# Patient Record
Sex: MALE | Race: WHITE | NOT HISPANIC OR LATINO | Employment: OTHER | URBAN - METROPOLITAN AREA
[De-identification: names, ages, dates, MRNs, and addresses within clinical notes are randomized per-mention and may not be internally consistent; named-entity substitution may affect disease eponyms.]

---

## 2018-03-05 ENCOUNTER — APPOINTMENT (EMERGENCY)
Dept: RADIOLOGY | Facility: HOSPITAL | Age: 83
End: 2018-03-05
Payer: MEDICARE

## 2018-03-05 ENCOUNTER — HOSPITAL ENCOUNTER (EMERGENCY)
Facility: HOSPITAL | Age: 83
Discharge: LEFT BEFORE DISCHARGE | End: 2018-03-05
Attending: EMERGENCY MEDICINE
Payer: MEDICARE

## 2018-03-05 VITALS
HEART RATE: 72 BPM | DIASTOLIC BLOOD PRESSURE: 84 MMHG | WEIGHT: 155 LBS | OXYGEN SATURATION: 99 % | TEMPERATURE: 97.8 F | SYSTOLIC BLOOD PRESSURE: 189 MMHG | RESPIRATION RATE: 16 BRPM

## 2018-03-05 DIAGNOSIS — R29.6 FREQUENT FALLS: Primary | ICD-10-CM

## 2018-03-05 DIAGNOSIS — S42.291A HUMERAL HEAD FRACTURE, RIGHT, CLOSED, INITIAL ENCOUNTER: ICD-10-CM

## 2018-03-05 PROCEDURE — 73030 X-RAY EXAM OF SHOULDER: CPT

## 2018-03-05 PROCEDURE — 99284 EMERGENCY DEPT VISIT MOD MDM: CPT

## 2018-03-05 RX ORDER — MORPHINE SULFATE 2 MG/ML
2 INJECTION, SOLUTION INTRAMUSCULAR; INTRAVENOUS ONCE
Status: DISCONTINUED | OUTPATIENT
Start: 2018-03-05 | End: 2018-03-05

## 2018-03-05 RX ORDER — OXYCODONE HYDROCHLORIDE AND ACETAMINOPHEN 5; 325 MG/1; MG/1
1 TABLET ORAL EVERY 4 HOURS PRN
Qty: 15 TABLET | Refills: 0 | Status: SHIPPED | OUTPATIENT
Start: 2018-03-05 | End: 2018-03-13

## 2018-03-05 RX ORDER — OXYCODONE HYDROCHLORIDE AND ACETAMINOPHEN 5; 325 MG/1; MG/1
1 TABLET ORAL ONCE
Status: COMPLETED | OUTPATIENT
Start: 2018-03-05 | End: 2018-03-05

## 2018-03-05 RX ADMIN — OXYCODONE HYDROCHLORIDE AND ACETAMINOPHEN 1 TABLET: 5; 325 TABLET ORAL at 07:37

## 2018-03-05 NOTE — ED PROVIDER NOTES
History  Chief Complaint   Patient presents with    Multiple Falls     pt states that he fell multiple times yesterday  denies any head injury or LOC  c/o right shoulder and arm pain  Pt  Shawna Womack 3 times in the past 2 days, once down 2 metal stairs  Pt  Didn't hit his head, not on blood thinners, no LOC, no headaches, no n/v, no change in mental status  Pt  Lives with son and ambulates without assistance  None       No past medical history on file  Past Surgical History:   Procedure Laterality Date    CARDIAC SURGERY         No family history on file  I have reviewed and agree with the history as documented  Social History   Substance Use Topics    Smoking status: Current Some Day Smoker     Types: Pipe    Smokeless tobacco: Never Used    Alcohol use Yes      Comment: socially        Review of Systems   Constitutional: Negative for appetite change, fatigue and fever  HENT: Negative for rhinorrhea and sore throat  Respiratory: Negative for cough, shortness of breath and wheezing  Cardiovascular: Negative for chest pain and leg swelling  Gastrointestinal: Negative for abdominal pain, diarrhea and vomiting  Genitourinary: Negative for dysuria and flank pain  Musculoskeletal: Positive for joint swelling  Negative for back pain and neck pain  Skin: Negative for rash  Neurological: Negative for syncope and headaches     Psychiatric/Behavioral:        Mood normal       Physical Exam  ED Triage Vitals [03/05/18 0652]   Temperature Pulse Respirations Blood Pressure SpO2   97 8 °F (36 6 °C) 72 16 (!) 189/84 99 %      Temp Source Heart Rate Source Patient Position - Orthostatic VS BP Location FiO2 (%)   Oral Monitor Sitting Left arm --      Pain Score       Worst Possible Pain           Orthostatic Vital Signs  Vitals:    03/05/18 0652   BP: (!) 189/84   Pulse: 72   Patient Position - Orthostatic VS: Sitting       Physical Exam   Constitutional: He is oriented to person, place, and time  He appears well-developed and well-nourished  HENT:   Head: Normocephalic and atraumatic  Mouth/Throat: Oropharynx is clear and moist    Eyes: Pupils are equal, round, and reactive to light  Neck: Normal range of motion  Neck supple  nontender   Cardiovascular: Normal rate and regular rhythm  Pulmonary/Chest: Effort normal and breath sounds normal    Abdominal: Soft  There is no tenderness  Musculoskeletal:   R shoulder xray +tenderness anteriorly, decreased ROM secondary to pain, +n/v intact    All other ext  - FROM, nontender   Neurological: He is alert and oriented to person, place, and time  Skin: Skin is warm and dry  Psychiatric: He has a normal mood and affect  Nursing note and vitals reviewed  ED Medications  Medications   oxyCODONE-acetaminophen (PERCOCET) 5-325 mg per tablet 1 tablet (1 tablet Oral Given 3/5/18 0737)       Diagnostic Studies  Results Reviewed     None                 XR shoulder 2+ views RIGHT    (Results Pending)              Procedures  Procedures       Phone Contacts  ED Phone Contact    ED Course  ED Course                                MDM  Number of Diagnoses or Management Options  Frequent falls:   Humeral head fracture, right, closed, initial encounter:      Amount and/or Complexity of Data Reviewed  Tests in the radiology section of CPT®: ordered and reviewed    Risk of Complications, Morbidity, and/or Mortality  Presenting problems: moderate  General comments: Pt  Is refusing bloodwork, CT head, any further workup  He only agrees to a R shoulder xray  Son is with him when I explained that an 80year old who fell 3 times in the past 2 days needs a further workup to r/o more serious things such as a bleed in the brain, MI, electrolyte or other lab abnormality, stroke , etc   Pt  Is signing out against medical advice and understands the risks of doing so       He was given a sling for the proximal humeral head fracture that was apparent on xray and understands to follow up with the orthopedic dr   He will return if anything is worse  He is AAOx3 and able to make his own decisions  CritCare Time    Disposition  Final diagnoses:   Frequent falls   Humeral head fracture, right, closed, initial encounter     Time reflects when diagnosis was documented in both MDM as applicable and the Disposition within this note     Time User Action Codes Description Comment    3/5/2018  7:49 AM Mayito Virk Add [R29 6] Frequent falls     3/5/2018  7:49 AM Chhaya Virk Add [P83 668I] Humeral head fracture, right, closed, initial encounter       ED Disposition     ED Disposition Condition Comment    AMA  Date: 3/5/2018  Patient: Alva Ferrari  Admitted: 3/5/2018  6:51 AM  Attending Provider: Priscila Hill MD    Alva Ferrari or his authorized caregiver has made the decision for the patient to leave the emergency department against the advic e of the emergency department staff  He or his authorized caregiver has been informed and understands the inherent risks, including death, stroke, heart attack, worsening of symptoms  He or his authorized caregiver has decided to accept the responsibili ty for this decision  Alva Ferrari and all necessary parties have been advised that he may return for further evaluation or treatment  His condition at time of discharge was stable    Alva Ferrari had current vital signs as follows:  BP (!) 189/84 (BP  Location: Left arm)   Pulse 72   Temp 97 8 °F (36 6 °C) (Oral)   Resp 16   Wt 70 3 kg (155 lb)         Follow-up Information     Follow up With Specialties Details Why Contact Info    [facility] [address]   [address] [address] [place] [address] [facility]   [address] [place] [address] [phone]        Patient's Medications   Discharge Prescriptions    OXYCODONE-ACETAMINOPHEN (PERCOCET) 5-325 MG PER TABLET    Take 1 tablet by mouth every 4 (four) hours as needed for moderate pain Max Daily Amount: 6 tablets       Start Date: 3/5/2018  End Date: --       Order Dose: 1 tablet       Quantity: 15 tablet    Refills: 0     No discharge procedures on file      ED Provider  Electronically Signed by           Priscila Hill MD  03/05/18 0914

## 2018-03-05 NOTE — DISCHARGE INSTRUCTIONS
Motrin/percocet for pain, arm sling, follow up with orthopedics, rest, ice      Proximal Humerus Fracture   WHAT YOU NEED TO KNOW:   A proximal humerus fracture is a crack or break in the top of your upper arm bone  The proximal humerus is one of the bones in your shoulder joint  This type of fracture may be caused by a fall, trauma from a car accident, or a sports injury  DISCHARGE INSTRUCTIONS:   Return to the emergency department if:   · Your pain does not get better or gets worse, even after you rest and take medicine  · Your arm, hand, or fingers feel numb  · The skin over your fracture is swollen, cold, or pale  · You cannot move your arm, hand, or fingers  Contact your healthcare provider if:   · You have a fever  · Your sling gets wet, damaged, or falls off  · You have questions or concerns about your condition or care  Medicines:   · Prescription pain medicine  may be given  Ask your healthcare provider how to take this medicine safely  Some prescription pain medicines contain acetaminophen  Do not take other medicines that contain acetaminophen without talking to your healthcare provider  Too much acetaminophen may cause liver damage  Prescription pain medicine may cause constipation  Ask your healthcare provider how to prevent or treat constipation  · NSAIDs , such as ibuprofen, help decrease swelling, pain, and fever  This medicine is available with or without a doctor's order  NSAIDs can cause stomach bleeding or kidney problems in certain people  If you take blood thinner medicine, always ask your healthcare provider if NSAIDs are safe for you  Always read the medicine label and follow directions  · Acetaminophen  decreases pain  It is available without a doctor's order  Ask how much to take and how often to take it  Follow directions  Acetaminophen can cause liver damage if not taken correctly  · Take your medicine as directed    Contact your healthcare provider if you think your medicine is not helping or if you have side effects  Tell him of her if you are allergic to any medicine  Keep a list of the medicines, vitamins, and herbs you take  Include the amounts, and when and why you take them  Bring the list or the pill bottles to follow-up visits  Carry your medicine list with you in case of an emergency  A sling  may be needed to hold your broken bones in place  It will decrease your arm movement and allow the bones to heal    Manage your symptoms:   · Rest  your arm as much as possible  Ask your healthcare provider when you can move your arm  Also ask when you can return to sports or vigorous exercises  · Apply ice  on your arm for 15 to 20 minutes every hour or as directed  Use an ice pack, or put crushed ice in a plastic bag  Cover it with a towel  Ice helps prevent tissue damage and decreases swelling and pain  · Go to physical therapy as directed  A physical therapist teaches you exercises to help improve movement and strength, and to decrease pain  Follow up with your healthcare provider as directed:  Write down your questions so you remember to ask them during your visits  © 2017 [facility] Information is for End User's use only and may not be sold, redistributed or otherwise used for commercial purposes  All illustrations and images included in CareNotes® are the copyrighted property of A D A M , Inc  or Shayne Rodríguez  The above information is an  only  It is not intended as medical advice for individual conditions or treatments  Talk to your doctor, nurse or pharmacist before following any medical regimen to see if it is safe and effective for you  Fall Prevention for Older Adults   WHAT YOU NEED TO KNOW:   As you age, your muscles weaken and your risk for falls increases  Your risk also increases if you take medicines that make you sleepy or dizzy   You may also be at risk if you have vision or joint problems, have low blood pressure, or are not active  DISCHARGE INSTRUCTIONS:   Call 911 or have someone else call if:   · You have fallen and are unconscious  · You have fallen and cannot move part of your body  Contact your healthcare provider if:   · You have fallen and have pain or a headache  · You have questions or concerns about your condition or care  Fall prevention tips:   · Stay active  Exercise can help strengthen your muscles and improve your balance  Your healthcare provider may recommend water aerobics, walking, or Rory Chi  He may also recommend physical therapy to improve your coordination  Never start an exercise program without asking your healthcare provider first     · Wear shoes that fit well and have soles that   Wear shoes both inside and outside  Use slippers with good   Avoid shoes with high heels  · Use assistive devices as directed  Your healthcare provider may suggest that you use a cane or walker to help you keep your balance  You may need to have grab bars put in your bathroom near the toilet or in the shower  · Stand or sit up slowly  This may help you keep your balance and prevent falls  · Wear a personal alarm  This is a device that allows you to call 911 if you need help  Ask for more information on personal alarms  · Manage your medical conditions  Keep all appointments with your healthcare providers  Visit your eye doctor as directed  Home safety tips:   · Add items to prevent falls in the bathroom  Put nonslip strips on your bath or shower floor to prevent you from slipping  Use a bath mat if you do not have carpet in the bathroom  This will prevent you from falling when you step out of the bath or shower  Use a shower seat so you do not need to stand while you shower  Sit on the toilet or a chair in your bathroom to dry yourself and put on clothing   This will prevent you from losing your balance from drying or dressing yourself while you are standing  · Keep paths clear  Remove books, shoes, and other objects from walkways and stairs  Place cords for telephones and lamps out of the way so that you do not need to walk over them  Tape them down if you cannot move them  Remove small rugs  If you cannot remove a rug, secure it with double-sided tape  This will prevent you from tripping  · Install bright lights in your home  Use night lights to help light paths to the bathroom or kitchen  Always turn on the light before you start walking  · Keep items you use often on shelves within reach  Do not use a step stool to help you reach an item  · Paint or place reflective tape on the edges of your stairs  This will help you see the stairs better  Follow up with your healthcare provider as directed:  Write down your questions so you remember to ask them during your visits  © 2017 [facility] Information is for End User's use only and may not be sold, redistributed or otherwise used for commercial purposes  All illustrations and images included in CareNotes® are the copyrighted property of [employer]  or Shayne Rodríguez  The above information is an  only  It is not intended as medical advice for individual conditions or treatments  Talk to your doctor, nurse or pharmacist before following any medical regimen to see if it is safe and effective for you

## 2018-03-13 ENCOUNTER — APPOINTMENT (OUTPATIENT)
Dept: RADIOLOGY | Facility: CLINIC | Age: 83
End: 2018-03-13
Payer: MEDICARE

## 2018-03-13 ENCOUNTER — OFFICE VISIT (OUTPATIENT)
Dept: OBGYN CLINIC | Facility: CLINIC | Age: 83
End: 2018-03-13
Payer: MEDICARE

## 2018-03-13 VITALS
HEART RATE: 75 BPM | HEIGHT: 66 IN | BODY MASS INDEX: 25.41 KG/M2 | WEIGHT: 158.13 LBS | SYSTOLIC BLOOD PRESSURE: 156 MMHG | DIASTOLIC BLOOD PRESSURE: 79 MMHG

## 2018-03-13 DIAGNOSIS — M25.511 RIGHT SHOULDER PAIN, UNSPECIFIED CHRONICITY: Primary | ICD-10-CM

## 2018-03-13 DIAGNOSIS — S40.021A CONTUSION, SHOULDER AND UPPER ARM, MULTIPLE SITES, RIGHT, INITIAL ENCOUNTER: ICD-10-CM

## 2018-03-13 DIAGNOSIS — M19.011 PRIMARY OSTEOARTHRITIS OF RIGHT SHOULDER: ICD-10-CM

## 2018-03-13 DIAGNOSIS — S40.011A CONTUSION, SHOULDER AND UPPER ARM, MULTIPLE SITES, RIGHT, INITIAL ENCOUNTER: ICD-10-CM

## 2018-03-13 DIAGNOSIS — M25.511 RIGHT SHOULDER PAIN, UNSPECIFIED CHRONICITY: ICD-10-CM

## 2018-03-13 PROCEDURE — 73030 X-RAY EXAM OF SHOULDER: CPT

## 2018-03-13 PROCEDURE — 99203 OFFICE O/P NEW LOW 30 MIN: CPT | Performed by: ORTHOPAEDIC SURGERY

## 2018-03-13 RX ORDER — TRAMADOL HYDROCHLORIDE 50 MG/1
50 TABLET ORAL EVERY 6 HOURS PRN
Qty: 30 TABLET | Refills: 0 | Status: ON HOLD | OUTPATIENT
Start: 2018-03-13 | End: 2018-04-15

## 2018-03-13 RX ORDER — IBUPROFEN 200 MG
200 TABLET ORAL EVERY 6 HOURS PRN
COMMUNITY
End: 2018-03-13 | Stop reason: ALTCHOICE

## 2018-04-15 ENCOUNTER — HOSPITAL ENCOUNTER (INPATIENT)
Facility: HOSPITAL | Age: 83
LOS: 2 days | Discharge: HOME | DRG: 282 | End: 2018-04-17
Attending: EMERGENCY MEDICINE | Admitting: INTERNAL MEDICINE
Payer: MEDICARE

## 2018-04-15 ENCOUNTER — APPOINTMENT (EMERGENCY)
Dept: RADIOLOGY | Facility: HOSPITAL | Age: 83
DRG: 282 | End: 2018-04-15
Payer: MEDICARE

## 2018-04-15 DIAGNOSIS — I21.4 NSTEMI (NON-ST ELEVATED MYOCARDIAL INFARCTION) (HCC): Primary | ICD-10-CM

## 2018-04-15 DIAGNOSIS — E78.5 HYPERLIPIDEMIA: ICD-10-CM

## 2018-04-15 DIAGNOSIS — I25.10 CAD (CORONARY ARTERY DISEASE): Chronic | ICD-10-CM

## 2018-04-15 PROBLEM — R07.9 CHEST PAIN: Status: ACTIVE | Noted: 2018-04-15

## 2018-04-15 LAB
ALBUMIN SERPL BCP-MCNC: 3.2 G/DL (ref 3.5–5)
ALP SERPL-CCNC: 85 U/L (ref 46–116)
ALT SERPL W P-5'-P-CCNC: 15 U/L (ref 12–78)
ANION GAP SERPL CALCULATED.3IONS-SCNC: 8 MMOL/L (ref 4–13)
APTT PPP: 171 SECONDS (ref 23–35)
APTT PPP: 51 SECONDS (ref 23–35)
AST SERPL W P-5'-P-CCNC: 15 U/L (ref 5–45)
ATRIAL RATE: 62 BPM
ATRIAL RATE: 71 BPM
BASOPHILS # BLD AUTO: 0.04 THOUSANDS/ΜL (ref 0–0.1)
BASOPHILS NFR BLD AUTO: 1 % (ref 0–1)
BILIRUB SERPL-MCNC: 0.3 MG/DL (ref 0.2–1)
BUN SERPL-MCNC: 20 MG/DL (ref 5–25)
CALCIUM SERPL-MCNC: 8.5 MG/DL
CHLORIDE SERPL-SCNC: 106 MMOL/L (ref 100–108)
CHOLEST SERPL-MCNC: 149 MG/DL (ref 50–200)
CO2 SERPL-SCNC: 26 MMOL/L (ref 21–32)
CREAT SERPL-MCNC: 1.3 MG/DL (ref 0.6–1.3)
EOSINOPHIL # BLD AUTO: 0.19 THOUSAND/ΜL (ref 0–0.61)
EOSINOPHIL NFR BLD AUTO: 3 % (ref 0–6)
ERYTHROCYTE [DISTWIDTH] IN BLOOD BY AUTOMATED COUNT: 14 % (ref 11.6–15.1)
ERYTHROCYTE [DISTWIDTH] IN BLOOD BY AUTOMATED COUNT: 14.1 % (ref 11.6–15.1)
GFR SERPL CREATININE-BSD FRML MDRD: 49 ML/MIN/1.73SQ M
GLUCOSE SERPL-MCNC: 111 MG/DL (ref 65–140)
HCT VFR BLD AUTO: 35 % (ref 36.5–49.3)
HCT VFR BLD AUTO: 37.3 % (ref 36.5–49.3)
HDLC SERPL-MCNC: 44 MG/DL (ref 40–60)
HGB BLD-MCNC: 11.3 G/DL (ref 12–17)
HGB BLD-MCNC: 11.9 G/DL (ref 12–17)
INR PPP: 1.12 (ref 0.86–1.16)
LDLC SERPL CALC-MCNC: 91 MG/DL (ref 0–100)
LIPASE SERPL-CCNC: 143 U/L (ref 73–393)
LYMPHOCYTES # BLD AUTO: 1.38 THOUSANDS/ΜL (ref 0.6–4.47)
LYMPHOCYTES NFR BLD AUTO: 22 % (ref 14–44)
MCH RBC QN AUTO: 31.1 PG (ref 26.8–34.3)
MCH RBC QN AUTO: 31.2 PG (ref 26.8–34.3)
MCHC RBC AUTO-ENTMCNC: 31.9 G/DL (ref 31.4–37.4)
MCHC RBC AUTO-ENTMCNC: 32.3 G/DL (ref 31.4–37.4)
MCV RBC AUTO: 96 FL (ref 82–98)
MCV RBC AUTO: 98 FL (ref 82–98)
MONOCYTES # BLD AUTO: 0.69 THOUSAND/ΜL (ref 0.17–1.22)
MONOCYTES NFR BLD AUTO: 11 % (ref 4–12)
NEUTROPHILS # BLD AUTO: 3.87 THOUSANDS/ΜL (ref 1.85–7.62)
NEUTS SEG NFR BLD AUTO: 63 % (ref 43–75)
NONHDLC SERPL-MCNC: 105 MG/DL
NRBC BLD AUTO-RTO: 0 /100 WBCS
P AXIS: 57 DEGREES
P AXIS: 78 DEGREES
PLATELET # BLD AUTO: 224 THOUSANDS/UL (ref 149–390)
PLATELET # BLD AUTO: 249 THOUSANDS/UL (ref 149–390)
PMV BLD AUTO: 9.3 FL (ref 8.9–12.7)
PMV BLD AUTO: 9.4 FL (ref 8.9–12.7)
POTASSIUM SERPL-SCNC: 4.1 MMOL/L (ref 3.5–5.3)
PR INTERVAL: 218 MS
PR INTERVAL: 232 MS
PROT SERPL-MCNC: 6.3 G/DL (ref 6.4–8.2)
PROTHROMBIN TIME: 14.7 SECONDS (ref 12.1–14.4)
QRS AXIS: -24 DEGREES
QRS AXIS: -26 DEGREES
QRSD INTERVAL: 88 MS
QRSD INTERVAL: 92 MS
QT INTERVAL: 380 MS
QT INTERVAL: 432 MS
QTC INTERVAL: 412 MS
QTC INTERVAL: 438 MS
RBC # BLD AUTO: 3.63 MILLION/UL (ref 3.88–5.62)
RBC # BLD AUTO: 3.82 MILLION/UL (ref 3.88–5.62)
SODIUM SERPL-SCNC: 140 MMOL/L (ref 136–145)
T WAVE AXIS: 107 DEGREES
T WAVE AXIS: 78 DEGREES
TRIGL SERPL-MCNC: 70 MG/DL
TROPONIN I SERPL-MCNC: 0.17 NG/ML
TROPONIN I SERPL-MCNC: 0.26 NG/ML
TROPONIN I SERPL-MCNC: 0.31 NG/ML
VENTRICULAR RATE: 62 BPM
VENTRICULAR RATE: 71 BPM
WBC # BLD AUTO: 5.69 THOUSAND/UL (ref 4.31–10.16)
WBC # BLD AUTO: 6.18 THOUSAND/UL (ref 4.31–10.16)

## 2018-04-15 PROCEDURE — 85730 THROMBOPLASTIN TIME PARTIAL: CPT | Performed by: PHYSICIAN ASSISTANT

## 2018-04-15 PROCEDURE — 85730 THROMBOPLASTIN TIME PARTIAL: CPT | Performed by: INTERNAL MEDICINE

## 2018-04-15 PROCEDURE — 80053 COMPREHEN METABOLIC PANEL: CPT | Performed by: EMERGENCY MEDICINE

## 2018-04-15 PROCEDURE — 83690 ASSAY OF LIPASE: CPT | Performed by: EMERGENCY MEDICINE

## 2018-04-15 PROCEDURE — 36415 COLL VENOUS BLD VENIPUNCTURE: CPT | Performed by: EMERGENCY MEDICINE

## 2018-04-15 PROCEDURE — 80061 LIPID PANEL: CPT | Performed by: INTERNAL MEDICINE

## 2018-04-15 PROCEDURE — 85025 COMPLETE CBC W/AUTO DIFF WBC: CPT | Performed by: EMERGENCY MEDICINE

## 2018-04-15 PROCEDURE — 85027 COMPLETE CBC AUTOMATED: CPT | Performed by: PHYSICIAN ASSISTANT

## 2018-04-15 PROCEDURE — 93005 ELECTROCARDIOGRAM TRACING: CPT

## 2018-04-15 PROCEDURE — 85610 PROTHROMBIN TIME: CPT | Performed by: PHYSICIAN ASSISTANT

## 2018-04-15 PROCEDURE — 99222 1ST HOSP IP/OBS MODERATE 55: CPT | Performed by: INTERNAL MEDICINE

## 2018-04-15 PROCEDURE — 71046 X-RAY EXAM CHEST 2 VIEWS: CPT

## 2018-04-15 PROCEDURE — 99285 EMERGENCY DEPT VISIT HI MDM: CPT

## 2018-04-15 PROCEDURE — 84484 ASSAY OF TROPONIN QUANT: CPT | Performed by: INTERNAL MEDICINE

## 2018-04-15 PROCEDURE — 84484 ASSAY OF TROPONIN QUANT: CPT | Performed by: EMERGENCY MEDICINE

## 2018-04-15 PROCEDURE — 93010 ELECTROCARDIOGRAM REPORT: CPT | Performed by: INTERNAL MEDICINE

## 2018-04-15 RX ORDER — HEPARIN SODIUM 10000 [USP'U]/100ML
3-20 INJECTION, SOLUTION INTRAVENOUS
Status: DISCONTINUED | OUTPATIENT
Start: 2018-04-15 | End: 2018-04-16

## 2018-04-15 RX ORDER — ASPIRIN 325 MG
325 TABLET ORAL DAILY
Status: DISCONTINUED | OUTPATIENT
Start: 2018-04-16 | End: 2018-04-16

## 2018-04-15 RX ORDER — NITROGLYCERIN 0.4 MG/1
0.4 TABLET SUBLINGUAL
Status: DISCONTINUED | OUTPATIENT
Start: 2018-04-15 | End: 2018-04-17 | Stop reason: HOSPADM

## 2018-04-15 RX ORDER — ASPIRIN 81 MG/1
324 TABLET, CHEWABLE ORAL ONCE
Status: COMPLETED | OUTPATIENT
Start: 2018-04-15 | End: 2018-04-15

## 2018-04-15 RX ORDER — ACETAMINOPHEN 325 MG/1
650 TABLET ORAL EVERY 6 HOURS PRN
Status: DISCONTINUED | OUTPATIENT
Start: 2018-04-15 | End: 2018-04-17 | Stop reason: HOSPADM

## 2018-04-15 RX ORDER — HEPARIN SODIUM 1000 [USP'U]/ML
1950 INJECTION, SOLUTION INTRAVENOUS; SUBCUTANEOUS AS NEEDED
Status: DISCONTINUED | OUTPATIENT
Start: 2018-04-15 | End: 2018-04-16

## 2018-04-15 RX ORDER — METOPROLOL TARTRATE 50 MG/1
50 TABLET, FILM COATED ORAL EVERY 12 HOURS SCHEDULED
Status: DISCONTINUED | OUTPATIENT
Start: 2018-04-15 | End: 2018-04-16

## 2018-04-15 RX ORDER — HEPARIN SODIUM 1000 [USP'U]/ML
3900 INJECTION, SOLUTION INTRAVENOUS; SUBCUTANEOUS ONCE
Status: COMPLETED | OUTPATIENT
Start: 2018-04-15 | End: 2018-04-15

## 2018-04-15 RX ORDER — 0.9 % SODIUM CHLORIDE 0.9 %
3 VIAL (ML) INJECTION AS NEEDED
Status: DISCONTINUED | OUTPATIENT
Start: 2018-04-15 | End: 2018-04-17 | Stop reason: HOSPADM

## 2018-04-15 RX ORDER — ATORVASTATIN CALCIUM 40 MG/1
80 TABLET, FILM COATED ORAL
Status: DISCONTINUED | OUTPATIENT
Start: 2018-04-15 | End: 2018-04-16

## 2018-04-15 RX ORDER — HEPARIN SODIUM 1000 [USP'U]/ML
3900 INJECTION, SOLUTION INTRAVENOUS; SUBCUTANEOUS AS NEEDED
Status: DISCONTINUED | OUTPATIENT
Start: 2018-04-15 | End: 2018-04-16

## 2018-04-15 RX ORDER — HYDRALAZINE HYDROCHLORIDE 20 MG/ML
10 INJECTION INTRAMUSCULAR; INTRAVENOUS EVERY 6 HOURS PRN
Status: DISCONTINUED | OUTPATIENT
Start: 2018-04-15 | End: 2018-04-17 | Stop reason: HOSPADM

## 2018-04-15 RX ORDER — ONDANSETRON 2 MG/ML
4 INJECTION INTRAMUSCULAR; INTRAVENOUS EVERY 6 HOURS PRN
Status: DISCONTINUED | OUTPATIENT
Start: 2018-04-15 | End: 2018-04-17 | Stop reason: HOSPADM

## 2018-04-15 RX ADMIN — METOPROLOL TARTRATE 50 MG: 50 TABLET ORAL at 21:15

## 2018-04-15 RX ADMIN — ASPIRIN 81 MG 324 MG: 81 TABLET ORAL at 07:57

## 2018-04-15 RX ADMIN — HEPARIN SODIUM AND DEXTROSE 12 UNITS/KG/HR: 10000; 5 INJECTION INTRAVENOUS at 11:47

## 2018-04-15 RX ADMIN — ATORVASTATIN CALCIUM 80 MG: 40 TABLET, FILM COATED ORAL at 15:46

## 2018-04-15 RX ADMIN — HEPARIN SODIUM 1950 UNITS: 1000 INJECTION, SOLUTION INTRAVENOUS; SUBCUTANEOUS at 21:08

## 2018-04-15 RX ADMIN — HEPARIN SODIUM 3900 UNITS: 1000 INJECTION, SOLUTION INTRAVENOUS; SUBCUTANEOUS at 11:42

## 2018-04-15 RX ADMIN — METOPROLOL TARTRATE 50 MG: 50 TABLET ORAL at 14:46

## 2018-04-15 NOTE — ED NOTES
Patient cooperative at this time  Not a good historian for past medical hx or medications        Saeed Dempsey RN  04/15/18 9671

## 2018-04-15 NOTE — CONSULTS
Consultation - Cardiology Team One  Maame Peres 80 y o  male MRN: [id number]  Unit/Bed#: ED 14 Encounter: [id number]    Inpatient consult to Cardiology  Consult performed by: Dylon Lee  Consult ordered by: Esau ePrez          Physician Requesting Consult: Lola Horse*  Reason for Consult / Principal Problem: Chest pain    HPI: Cardiologist Dr Devon Aguilar is a 80y o  year old male who has a history of CAD s/p CABG presenting with chest pain since yesterday  Patient describes chest pain as sharp, with associated heaviness in the chest, substernal, lasting 30-45 minutes yesterday  Denies shortness of breath, lightheadedness, palpitations, leg swelling, syncope  Patient has not seen a cardiologist in several years  REVIEW OF SYSTEMS:  Constitutional:  Denies fever or chills   Eyes:  Denies change in visual acuity   HENT:  Denies nasal congestion or sore throat   Respiratory:  Denies cough or shortness of breath   Cardiovascular: +chest pain   Denies edema   GI:  Denies abdominal pain, nausea, vomiting, bloody stools or diarrhea   :  Denies dysuria, frequency, difficulty in micturition and nocturia  Musculoskeletal:  Denies back pain or joint pain   Neurologic:  Denies headache, focal weakness or sensory changes   Endocrine:  Denies polyuria or polydipsia   Lymphatic:  Denies swollen glands   Psychiatric:  Denies depression or anxiety     Historical Information   Past Medical History:   Diagnosis Date    Arthritis     Disease of thyroid gland     Heart disease      Past Surgical History:   Procedure Laterality Date    CARDIAC SURGERY       History   Alcohol Use    Yes     Comment: socially     History   Drug Use No     History   Smoking Status    Current Some Day Smoker    Types: Pipe   Smokeless Tobacco    Never Used       Family History:   Family History   Problem Relation Age of Onset    No Known Problems Mother     No Known Problems Father MEDS & ALLERGIES:  all current active meds have been reviewed and current meds: Current Facility-Administered Medications   Medication Dose Route Frequency    sodium chloride (PF) 0 9 % injection 3 mL  3 mL Intravenous PRN        Allergies   Allergen Reactions    Penicillins Swelling    Keflex [Cephalexin]     Prednisone Other (See Comments)     Pt states "i don't know, I break out"       OBJECTIVE:  Vitals:   Vitals:    04/15/18 1015   BP: (!) 175/88   Pulse: 65   Resp:    Temp:    SpO2: 95%     Body mass index is 24 84 kg/m²  Systolic (81HUJ), UCA:299 , Min:151 , QCQ:027     Diastolic (78CZS), UJX:77, Min:73, Max:153    No intake or output data in the 24 hours ending 04/15/18 1033  Weight (last 2 days)     Date/Time   Weight    04/15/18 0725  69 8 (153 88)            Invasive Devices     Peripheral Intravenous Line            Peripheral IV 04/15/18 Left Antecubital less than 1 day                PHYSICAL EXAMS:  General:  Patient is not in acute distress, laying in the bed comfortably, awake, alert responding to commands  Head: Normocephalic, Atraumatic  HEENT: White sclera, pink conjunctiva,  PERRLA,pharynx benign  Neck:  Supple, no neck vein distention, carotids+2/+2 no bruits, thyromegaly, adenopathy  Respiratory: clear to P/A  Cardiovascular:  PMI normal, S1-S2 normal, No  Murmurs, thrills, gallops, rubs   Regular rhythm  GI:  Abdomen soft nontender   No hepatosplenomegaly, adenopathy, ascites,or rebound tenderness  Extremities: No edema, normal pulses, no calf tenderness, no joint deformities, no venous disease   Integument:  No skin rashes or ulceration  Lymphatic:  No cervical or inguinal lymphadenopathy  Neurologic:  Patient is awake alert, responding to command, well-oriented to time and place and person moving all extremities    LABORATORY RESULTS:    Results from last 7 days  Lab Units 04/15/18  0754   TROPONIN I ng/mL 0 17*     CBC with diff:   Results from last 7 days  Lab Units 04/15/18  0754   WBC Thousand/uL 6 18   HEMOGLOBIN g/dL 11 9*   HEMATOCRIT % 37 3   MCV fL 98   PLATELETS Thousands/uL 249   MCH pg 31 2   MCHC g/dL 31 9   RDW % 14 1   MPV fL 9 3   NRBC AUTO /100 WBCs 0       CMP:  Results from last 7 days  Lab Units 04/15/18  0754   SODIUM mmol/L 140   POTASSIUM mmol/L 4 1   CHLORIDE mmol/L 106   CO2 mmol/L 26   ANION GAP mmol/L 8   BUN mg/dL 20   CREATININE mg/dL 1 30   GLUCOSE RANDOM mg/dL 111   CALCIUM mg/dL 8 5   AST U/L 15   ALT U/L 15   ALK PHOS U/L 85   TOTAL PROTEIN g/dL 6 3*   BILIRUBIN TOTAL mg/dL 0 30   EGFR ml/min/1 73sq m 49       BMP:  Results from last 7 days  Lab Units 04/15/18  0754   SODIUM mmol/L 140   POTASSIUM mmol/L 4 1   CHLORIDE mmol/L 106   CO2 mmol/L 26   BUN mg/dL 20   CREATININE mg/dL 1 30   GLUCOSE RANDOM mg/dL 111   CALCIUM mg/dL 8 5                              Lipid Profile:   No results found for: CHOL  No results found for: HDL  No results found for: LDLCALC  No results found for: TRIG    Cardiac testing:   No results found for this or any previous visit  No results found for this or any previous visit  No procedure found  No results found for this or any previous visit  Imaging:   I have personally reviewed pertinent reports  EKG reviewed personally:  ST depressions and T-wave inversions in leads 1 and aVL    Assessment/Plan:  1  Chest pain, Hx CAD s/p CABG, NSTEMI type to be determined:  -initial troponin 0 17, continue to trend  -EKG shows ischemic changes in lateral leads  -ECHO ordered, will assess EF and regional wall motion abnormalities  -will start heparin drip  -continue aspirin  -will proceed with cardiac catheterization tomorrow  -procedure of cardiac catheterization discussed  Risks and benefits explained  Adverse outcomes include 1% risk of bleeding, infection, stroke, myocardial infarction, kidney injury  All questions answered, informed consent obtained  NPO after midnight      Code Status: No Order    Counseling / Coordination of Care  Total floor / unit time spent today 35 minutes  Greater than 50% of total time was spent with the patient and / or family counseling and / or coordination of care  A description of the counseling / coordination of care: Review of history, current assessment, development of a plan      Jordan Morales PA-C  4/15/2018,10:33 AM

## 2018-04-15 NOTE — ED PROVIDER NOTES
Pt Name: Haja Grant  MRN: [id number]  [place] 9/6/1930  Age/Sex: 80 y o  male  Date of evaluation: 4/15/2018  PCP: No primary care provider on file  CHIEF COMPLAINT    Chief Complaint   Patient presents with    Chest Pain     pt had an episode of chest pain yesterday that lasted about 45 minutes  no pain since  pt states "i didn't think i needed to come "          HPI    Dereck Lynn presents to the Emergency Department complaining of chest pain that occurred yesterday while he was watching TV  Pain lasted 30-45 minutes and resolved  He has not had any pain since that time  He does have an extensive cardiac hx but has not seen a cardiologist in many years  He did not want to come to the hospital but today his son insisted  HPI      Past Medical and Surgical History    Past Medical History:   Diagnosis Date    Arthritis     Coronary artery disease     Disease of thyroid gland     Heart disease     Hypertension        Past Surgical History:   Procedure Laterality Date    CARDIAC SURGERY      HERNIA REPAIR         Family History   Problem Relation Age of Onset    No Known Problems Mother     No Known Problems Father        Social History   Substance Use Topics    Smoking status: Current Some Day Smoker     Types: Pipe    Smokeless tobacco: Never Used    Alcohol use Yes      Comment: socially           Allergies    Allergies   Allergen Reactions    Penicillins Swelling    Keflex [Cephalexin]     Prednisone Other (See Comments)     Pt states "i don't know, I break out"       Home Medications    Prior to Admission medications    Medication Sig Start Date End Date Taking? Authorizing Provider   traMADol (ULTRAM) 50 mg tablet Take 1 tablet (50 mg total) by mouth every 6 (six) hours as needed for moderate pain 3/13/18   Bekah Zimmerman MD           Review of Systems    Review of Systems   Constitutional: Negative for activity change, appetite change, chills, fatigue and fever     HENT: Negative for congestion, rhinorrhea, sinus pressure, sneezing, sore throat and trouble swallowing  Eyes: Negative for photophobia and visual disturbance  Respiratory: Negative for chest tightness, shortness of breath and wheezing  Cardiovascular: Negative for chest pain and leg swelling  Gastrointestinal: Negative for abdominal distention, abdominal pain, constipation, diarrhea, nausea and vomiting  Endocrine: Negative for polydipsia, polyphagia and polyuria  Genitourinary: Negative for decreased urine volume, difficulty urinating, dysuria, flank pain, frequency and urgency  Musculoskeletal: Negative for back pain, gait problem, joint swelling and neck pain  Skin: Negative for color change, pallor and rash  Allergic/Immunologic: Negative for immunocompromised state  Neurological: Negative for seizures, syncope, speech difficulty, weakness, light-headedness and headaches  Psychiatric/Behavioral: Negative for confusion  All other systems reviewed and are negative  Physical Exam      ED Triage Vitals [04/15/18 0725]   Temperature Pulse Respirations Blood Pressure SpO2   97 9 °F (36 6 °C) 81 16 (!) 193/85 100 %      Temp Source Heart Rate Source Patient Position - Orthostatic VS BP Location FiO2 (%)   Oral Monitor Lying Right arm --      Pain Score       No Pain               Physical Exam   Constitutional: He is oriented to person, place, and time  He appears well-developed and well-nourished  No distress  HENT:   Head: Normocephalic and atraumatic  Nose: Nose normal    Mouth/Throat: Oropharynx is clear and moist    Eyes: Conjunctivae and EOM are normal  Pupils are equal, round, and reactive to light  Neck: Normal range of motion  Neck supple  Cardiovascular: Normal rate, regular rhythm and normal heart sounds  Exam reveals no gallop and no friction rub  No murmur heard  Pulmonary/Chest: Effort normal and breath sounds normal  No respiratory distress  He has no wheezes   He has no rales  Abdominal: Soft  Bowel sounds are normal  There is no tenderness  There is no rebound and no guarding  Musculoskeletal: Normal range of motion  Neurological: He is alert and oriented to person, place, and time  Skin: Skin is warm and dry  He is not diaphoretic  Psychiatric: He has a normal mood and affect  His behavior is normal    Nursing note and vitals reviewed  Assessment and Plan    Pepe Narayanan is a 80 y o  male who presents with chest pain  Physical examination unremarkable  Differential diagnosis (not completely inclusive) includes cardiac vs non-cardiac causes of chest pain  Plan will be to perform diagnostic testing and treat symptomatically  MDM    Diagnostic Results    EKG:  there are no previous tracings available for comparison    EKG INTERPRETATION  RHYTHM:NSR  AXIS: NSR @ 71  INTERVALS: first degree AV block  QRS COMPLEX: meets criteria for LVH  ST SEGMENT: non-specific  QT INTERVAL: normal  COMPARED WITH PRIOR none available  Interpretation by Lluvia Ponce DO  EKG reviewed and interpreted independently      Labs:    Results for orders placed or performed during the hospital encounter of 04/15/18   CBC and differential   Result Value Ref Range    WBC 6 18 4 31 - 10 16 Thousand/uL    RBC 3 82 (L) 3 88 - 5 62 Million/uL    Hemoglobin 11 9 (L) 12 0 - 17 0 g/dL    Hematocrit 37 3 36 5 - 49 3 %    MCV 98 82 - 98 fL    MCH 31 2 26 8 - 34 3 pg    MCHC 31 9 31 4 - 37 4 g/dL    RDW 14 1 11 6 - 15 1 %    MPV 9 3 8 9 - 12 7 fL    Platelets 456 954 - 392 Thousands/uL    nRBC 0 /100 WBCs    Neutrophils Relative 63 43 - 75 %    Lymphocytes Relative 22 14 - 44 %    Monocytes Relative 11 4 - 12 %    Eosinophils Relative 3 0 - 6 %    Basophils Relative 1 0 - 1 %    Neutrophils Absolute 3 87 1 85 - 7 62 Thousands/µL    Lymphocytes Absolute 1 38 0 60 - 4 47 Thousands/µL    Monocytes Absolute 0 69 0 17 - 1 22 Thousand/µL    Eosinophils Absolute 0 19 0 00 - 0 61 Thousand/µL    Basophils Absolute 0 04 0 00 - 0 10 Thousands/µL   Comprehensive metabolic panel   Result Value Ref Range    Sodium 140 136 - 145 mmol/L    Potassium 4 1 3 5 - 5 3 mmol/L    Chloride 106 100 - 108 mmol/L    CO2 26 21 - 32 mmol/L    Anion Gap 8 4 - 13 mmol/L    BUN 20 5 - 25 mg/dL    Creatinine 1 30 0 60 - 1 30 mg/dL    Glucose 111 65 - 140 mg/dL    Calcium 8 5 mg/dL    AST 15 5 - 45 U/L    ALT 15 12 - 78 U/L    Alkaline Phosphatase 85 46 - 116 U/L    Total Protein 6 3 (L) 6 4 - 8 2 g/dL    Albumin 3 2 (L) 3 5 - 5 0 g/dL    Total Bilirubin 0 30 0 20 - 1 00 mg/dL    eGFR 49 ml/min/1 73sq m   Lipase   Result Value Ref Range    Lipase 143 73 - 393 u/L   Troponin I   Result Value Ref Range    Troponin I 0 17 (H) <=0 04 ng/mL   Troponin I repeat in 3 hrs   Result Value Ref Range    Troponin I 0 26 (H) <=0 04 ng/mL   APTT six (6) hours after Heparin bolus/drip initiation or dosing change   Result Value Ref Range     (HH) 23 - 35 seconds   CBC   Result Value Ref Range    WBC 5 69 4 31 - 10 16 Thousand/uL    RBC 3 63 (L) 3 88 - 5 62 Million/uL    Hemoglobin 11 3 (L) 12 0 - 17 0 g/dL    Hematocrit 35 0 (L) 36 5 - 49 3 %    MCV 96 82 - 98 fL    MCH 31 1 26 8 - 34 3 pg    MCHC 32 3 31 4 - 37 4 g/dL    RDW 14 0 11 6 - 15 1 %    Platelets 696 963 - 953 Thousands/uL    MPV 9 4 8 9 - 12 7 fL   Protime-INR   Result Value Ref Range    Protime 14 7 (H) 12 1 - 14 4 seconds    INR 1 12 0 86 - 1 16   Lipid panel   Result Value Ref Range    Cholesterol 149 50 - 200 mg/dL    Triglycerides 70 <=150 mg/dL    HDL, Direct 44 40 - 60 mg/dL    LDL Calculated 91 0 - 100 mg/dL    Non-HDL-Chol (CHOL-HDL) 105 mg/dl   Troponin I   Result Value Ref Range    Troponin I 0 31 (H) <=0 04 ng/mL   APTT   Result Value Ref Range    PTT 51 (H) 23 - 35 seconds   APTT   Result Value Ref Range    PTT 84 (H) 23 - 35 seconds   Basic metabolic panel   Result Value Ref Range    Sodium 139 136 - 145 mmol/L    Potassium 4 2 3 5 - 5 3 mmol/L Chloride 106 100 - 108 mmol/L    CO2 26 21 - 32 mmol/L    Anion Gap 7 4 - 13 mmol/L    BUN 26 (H) 5 - 25 mg/dL    Creatinine 1 17 0 60 - 1 30 mg/dL    Glucose 91 65 - 140 mg/dL    Calcium 8 6 mg/dL    eGFR 56 ml/min/1 73sq m   CBC (With Platelets)   Result Value Ref Range    WBC 6 85 4 31 - 10 16 Thousand/uL    RBC 3 57 (L) 3 88 - 5 62 Million/uL    Hemoglobin 11 0 (L) 12 0 - 17 0 g/dL    Hematocrit 34 2 (L) 36 5 - 49 3 %    MCV 96 82 - 98 fL    MCH 30 8 26 8 - 34 3 pg    MCHC 32 2 31 4 - 37 4 g/dL    RDW 14 0 11 6 - 15 1 %    Platelets 029 180 - 374 Thousands/uL    MPV 9 0 8 9 - 12 7 fL   TSH, 3rd generation   Result Value Ref Range    TSH 3RD GENERATON 5 808 (H) 0 358 - 3 740 uIU/mL   Protime-INR   Result Value Ref Range    Protime 14 2 12 1 - 14 4 seconds    INR 1 07 0 86 - 1 16   ECG 12 lead   Result Value Ref Range    Ventricular Rate 71 BPM    Atrial Rate 71 BPM    UT Interval 218 ms    QRSD Interval 88 ms    QT Interval 380 ms    QTC Interval 412 ms    P [place] 57 degrees    QRS Axis -24 degrees    T Wave Axis 107 degrees   ECG 12 lead   Result Value Ref Range    Ventricular Rate 62 BPM    Atrial Rate 62 BPM    UT Interval 232 ms    QRSD Interval 92 ms    QT Interval 432 ms    QTC Interval 438 ms    P Axis 78 degrees    QRS Axis -26 degrees    T Wave Axis 78 degrees       All labs reviewed and utilized in the medical decision making process    Radiology:    X-ray chest 2 views   Final Result      No acute cardiopulmonary disease  Workstation performed: [id number]             All radiology studies independently viewed by me and interpreted by the radiologist     Procedure    Procedures    CritCare Time      ED Course of Care and Re-Assessments    4:50 PM  I spoke with Dr Belkys Rubio who will see patient in consult       Medications   sodium chloride 0 9 % infusion (0 mL/hr Intravenous Stopped 4/16/18 1800)   aspirin chewable tablet 324 mg (324 mg Oral Given 4/15/18 0757)   heparin (porcine) injection 3,900 Units (3,900 Units Intravenous Given 4/15/18 1142)   lidocaine (XYLOCAINE) 1 % injection (10 mL Infiltration Given 4/16/18 1006)   midazolam (VERSED) injection (1 mg Intravenous Given 4/16/18 1006)   fentanyl citrate (PF) 100 MCG/2ML (50 mcg Intravenous Given 4/16/18 1006)   iodixanol (VISIPAQUE) 320 MG/ML injection (100 mL Intravenous Given 4/16/18 1032)           FINAL IMPRESSION    Final diagnoses:   NSTEMI (non-ST elevated myocardial infarction) [facility])         DISPOSITION/PLAN    Time reflects when diagnosis was documented in both MDM as applicable and the Disposition within this note     Time User Action Codes Description Comment    4/15/2018  9:36 AM Kirit AUSTIN Add [I21 4] NSTEMI (non-ST elevated myocardial infarction) ([facility] [address] )     4/17/2018 11:28 AM Sierra Nelson Add [E78 5] Hyperlipidemia     4/17/2018 11:28 AM Sierra Nelson Add [I25 10] CAD (coronary artery disease)       ED Disposition     ED Disposition Condition Comment    Admit  Case was discussed with Dr Ervin Anand and the patient's admission status was agreed to be Admission Status: inpatient status to the service of Dr Ervin Anand   Follow-up Information     Follow up With Specialties Details Why Cruz Rojas MD Cardiology Schedule an appointment as soon as possible for a visit in 1 week(s) Follow up with Dr Valente Myers on Wed Apr 25 2018 at 3:20 pm at [address]/[facility] [address]  [phone]      Infolink  Schedule an appointment as soon as possible for a visit in 1 week(s) Please establish care with PCP in 1 week for follow up [address] [facility], [address] [facility], DO  04/18/18 8494

## 2018-04-15 NOTE — ED NOTES
Convey to RN  Reason for not starting heparin pt is going to cath lab time in known     Lavonnedouglas Fre[facility]  04/15/18 7014

## 2018-04-16 ENCOUNTER — APPOINTMENT (INPATIENT)
Dept: INTERVENTIONAL RADIOLOGY/VASCULAR | Facility: HOSPITAL | Age: 83
DRG: 282 | End: 2018-04-16
Payer: MEDICARE

## 2018-04-16 ENCOUNTER — APPOINTMENT (INPATIENT)
Dept: NON INVASIVE DIAGNOSTICS | Facility: HOSPITAL | Age: 83
DRG: 282 | End: 2018-04-16
Payer: MEDICARE

## 2018-04-16 LAB
ANION GAP SERPL CALCULATED.3IONS-SCNC: 7 MMOL/L (ref 4–13)
APTT PPP: 84 SECONDS (ref 23–35)
BUN SERPL-MCNC: 26 MG/DL (ref 5–25)
CALCIUM SERPL-MCNC: 8.6 MG/DL
CHLORIDE SERPL-SCNC: 106 MMOL/L (ref 100–108)
CO2 SERPL-SCNC: 26 MMOL/L (ref 21–32)
CREAT SERPL-MCNC: 1.17 MG/DL (ref 0.6–1.3)
ERYTHROCYTE [DISTWIDTH] IN BLOOD BY AUTOMATED COUNT: 14 % (ref 11.6–15.1)
GFR SERPL CREATININE-BSD FRML MDRD: 56 ML/MIN/1.73SQ M
GLUCOSE SERPL-MCNC: 91 MG/DL (ref 65–140)
HCT VFR BLD AUTO: 34.2 % (ref 36.5–49.3)
HGB BLD-MCNC: 11 G/DL (ref 12–17)
INR PPP: 1.07 (ref 0.86–1.16)
MCH RBC QN AUTO: 30.8 PG (ref 26.8–34.3)
MCHC RBC AUTO-ENTMCNC: 32.2 G/DL (ref 31.4–37.4)
MCV RBC AUTO: 96 FL (ref 82–98)
PLATELET # BLD AUTO: 203 THOUSANDS/UL (ref 149–390)
PMV BLD AUTO: 9 FL (ref 8.9–12.7)
POTASSIUM SERPL-SCNC: 4.2 MMOL/L (ref 3.5–5.3)
PROTHROMBIN TIME: 14.2 SECONDS (ref 12.1–14.4)
RBC # BLD AUTO: 3.57 MILLION/UL (ref 3.88–5.62)
SODIUM SERPL-SCNC: 139 MMOL/L (ref 136–145)
TSH SERPL DL<=0.05 MIU/L-ACNC: 5.81 UIU/ML (ref 0.36–3.74)
WBC # BLD AUTO: 6.85 THOUSAND/UL (ref 4.31–10.16)

## 2018-04-16 PROCEDURE — B218YZZ FLUOROSCOPY OF LEFT INTERNAL MAMMARY BYPASS GRAFT USING OTHER CONTRAST: ICD-10-PCS | Performed by: INTERNAL MEDICINE

## 2018-04-16 PROCEDURE — 4A023N7 MEASUREMENT OF CARDIAC SAMPLING AND PRESSURE, LEFT HEART, PERCUTANEOUS APPROACH: ICD-10-PCS | Performed by: INTERNAL MEDICINE

## 2018-04-16 PROCEDURE — B212YZZ FLUOROSCOPY OF SINGLE CORONARY ARTERY BYPASS GRAFT USING OTHER CONTRAST: ICD-10-PCS | Performed by: INTERNAL MEDICINE

## 2018-04-16 PROCEDURE — 99233 SBSQ HOSP IP/OBS HIGH 50: CPT | Performed by: INTERNAL MEDICINE

## 2018-04-16 PROCEDURE — C1769 GUIDE WIRE: HCPCS | Performed by: PHYSICIAN ASSISTANT

## 2018-04-16 PROCEDURE — B211YZZ FLUOROSCOPY OF MULTIPLE CORONARY ARTERIES USING OTHER CONTRAST: ICD-10-PCS | Performed by: INTERNAL MEDICINE

## 2018-04-16 PROCEDURE — 99153 MOD SED SAME PHYS/QHP EA: CPT | Performed by: PHYSICIAN ASSISTANT

## 2018-04-16 PROCEDURE — 99152 MOD SED SAME PHYS/QHP 5/>YRS: CPT | Performed by: PHYSICIAN ASSISTANT

## 2018-04-16 PROCEDURE — 80048 BASIC METABOLIC PNL TOTAL CA: CPT | Performed by: INTERNAL MEDICINE

## 2018-04-16 PROCEDURE — 93459 L HRT ART/GRFT ANGIO: CPT | Performed by: INTERNAL MEDICINE

## 2018-04-16 PROCEDURE — 93306 TTE W/DOPPLER COMPLETE: CPT | Performed by: INTERNAL MEDICINE

## 2018-04-16 PROCEDURE — 85027 COMPLETE CBC AUTOMATED: CPT | Performed by: INTERNAL MEDICINE

## 2018-04-16 PROCEDURE — 84443 ASSAY THYROID STIM HORMONE: CPT | Performed by: INTERNAL MEDICINE

## 2018-04-16 PROCEDURE — 93306 TTE W/DOPPLER COMPLETE: CPT

## 2018-04-16 PROCEDURE — 99232 SBSQ HOSP IP/OBS MODERATE 35: CPT | Performed by: INTERNAL MEDICINE

## 2018-04-16 PROCEDURE — 85610 PROTHROMBIN TIME: CPT | Performed by: INTERNAL MEDICINE

## 2018-04-16 PROCEDURE — 99152 MOD SED SAME PHYS/QHP 5/>YRS: CPT | Performed by: INTERNAL MEDICINE

## 2018-04-16 PROCEDURE — 85730 THROMBOPLASTIN TIME PARTIAL: CPT | Performed by: INTERNAL MEDICINE

## 2018-04-16 PROCEDURE — 93459 L HRT ART/GRFT ANGIO: CPT | Performed by: PHYSICIAN ASSISTANT

## 2018-04-16 PROCEDURE — C1894 INTRO/SHEATH, NON-LASER: HCPCS | Performed by: PHYSICIAN ASSISTANT

## 2018-04-16 RX ORDER — LIDOCAINE HYDROCHLORIDE 10 MG/ML
INJECTION, SOLUTION INFILTRATION; PERINEURAL CODE/TRAUMA/SEDATION MEDICATION
Status: COMPLETED | OUTPATIENT
Start: 2018-04-16 | End: 2018-04-16

## 2018-04-16 RX ORDER — ATORVASTATIN CALCIUM 40 MG/1
40 TABLET, FILM COATED ORAL
Status: DISCONTINUED | OUTPATIENT
Start: 2018-04-16 | End: 2018-04-17 | Stop reason: HOSPADM

## 2018-04-16 RX ORDER — UBIDECARENONE 75 MG
100 CAPSULE ORAL DAILY
Status: DISCONTINUED | OUTPATIENT
Start: 2018-04-16 | End: 2018-04-17 | Stop reason: HOSPADM

## 2018-04-16 RX ORDER — SODIUM CHLORIDE 9 MG/ML
75 INJECTION, SOLUTION INTRAVENOUS CONTINUOUS
Status: DISPENSED | OUTPATIENT
Start: 2018-04-16 | End: 2018-04-16

## 2018-04-16 RX ORDER — ASPIRIN 81 MG/1
81 TABLET ORAL DAILY
Status: DISCONTINUED | OUTPATIENT
Start: 2018-04-17 | End: 2018-04-17 | Stop reason: HOSPADM

## 2018-04-16 RX ORDER — MIDAZOLAM HYDROCHLORIDE 1 MG/ML
INJECTION INTRAMUSCULAR; INTRAVENOUS CODE/TRAUMA/SEDATION MEDICATION
Status: COMPLETED | OUTPATIENT
Start: 2018-04-16 | End: 2018-04-16

## 2018-04-16 RX ORDER — LEVOTHYROXINE SODIUM 88 UG/1
88 TABLET ORAL DAILY
Status: DISCONTINUED | OUTPATIENT
Start: 2018-04-16 | End: 2018-04-17 | Stop reason: HOSPADM

## 2018-04-16 RX ORDER — HEPARIN SODIUM 5000 [USP'U]/ML
5000 INJECTION, SOLUTION INTRAVENOUS; SUBCUTANEOUS EVERY 8 HOURS SCHEDULED
Status: DISCONTINUED | OUTPATIENT
Start: 2018-04-17 | End: 2018-04-17 | Stop reason: HOSPADM

## 2018-04-16 RX ORDER — METOPROLOL SUCCINATE 25 MG/1
25 TABLET, EXTENDED RELEASE ORAL DAILY
COMMUNITY
End: 2018-04-25 | Stop reason: SDUPTHER

## 2018-04-16 RX ORDER — LEVOTHYROXINE SODIUM 88 UG/1
88 TABLET ORAL DAILY
COMMUNITY
End: 2018-04-25 | Stop reason: SDUPTHER

## 2018-04-16 RX ORDER — RAMIPRIL 5 MG/1
5 CAPSULE ORAL DAILY
Status: DISCONTINUED | OUTPATIENT
Start: 2018-04-16 | End: 2018-04-17 | Stop reason: HOSPADM

## 2018-04-16 RX ORDER — ROSUVASTATIN CALCIUM 20 MG/1
20 TABLET, COATED ORAL DAILY
COMMUNITY
End: 2018-04-17 | Stop reason: HOSPADM

## 2018-04-16 RX ORDER — FENTANYL CITRATE 50 UG/ML
INJECTION, SOLUTION INTRAMUSCULAR; INTRAVENOUS CODE/TRAUMA/SEDATION MEDICATION
Status: COMPLETED | OUTPATIENT
Start: 2018-04-16 | End: 2018-04-16

## 2018-04-16 RX ORDER — NITROGLYCERIN 0.4 MG/1
0.4 TABLET SUBLINGUAL
COMMUNITY

## 2018-04-16 RX ORDER — LISINOPRIL 5 MG/1
5 TABLET ORAL DAILY
Status: DISCONTINUED | OUTPATIENT
Start: 2018-04-17 | End: 2018-04-16

## 2018-04-16 RX ORDER — METOPROLOL SUCCINATE 25 MG/1
25 TABLET, EXTENDED RELEASE ORAL DAILY
Status: DISCONTINUED | OUTPATIENT
Start: 2018-04-16 | End: 2018-04-17 | Stop reason: HOSPADM

## 2018-04-16 RX ORDER — RAMIPRIL 5 MG/1
5 CAPSULE ORAL DAILY
COMMUNITY
End: 2018-04-25 | Stop reason: SDUPTHER

## 2018-04-16 RX ORDER — ASPIRIN 81 MG/1
81 TABLET ORAL DAILY
COMMUNITY

## 2018-04-16 RX ADMIN — LIDOCAINE HYDROCHLORIDE 10 ML: 10 INJECTION, SOLUTION INFILTRATION; PERINEURAL at 10:06

## 2018-04-16 RX ADMIN — LEVOTHYROXINE SODIUM 88 MCG: 88 TABLET ORAL at 14:40

## 2018-04-16 RX ADMIN — ATORVASTATIN CALCIUM 40 MG: 40 TABLET, FILM COATED ORAL at 16:22

## 2018-04-16 RX ADMIN — SODIUM CHLORIDE 75 ML/HR: 0.9 INJECTION, SOLUTION INTRAVENOUS at 11:01

## 2018-04-16 RX ADMIN — METOPROLOL SUCCINATE 25 MG: 25 TABLET, EXTENDED RELEASE ORAL at 14:40

## 2018-04-16 RX ADMIN — MIDAZOLAM HYDROCHLORIDE 1 MG: 1 INJECTION, SOLUTION INTRAMUSCULAR; INTRAVENOUS at 10:06

## 2018-04-16 RX ADMIN — FENTANYL CITRATE 50 MCG: 50 INJECTION, SOLUTION INTRAMUSCULAR; INTRAVENOUS at 10:06

## 2018-04-16 RX ADMIN — METOPROLOL TARTRATE 50 MG: 50 TABLET ORAL at 08:24

## 2018-04-16 RX ADMIN — ASPIRIN 325 MG: 325 TABLET ORAL at 08:24

## 2018-04-16 RX ADMIN — IODIXANOL 100 ML: 320 INJECTION, SOLUTION INTRAVASCULAR at 10:32

## 2018-04-16 RX ADMIN — RAMIPRIL 5 MG: 5 CAPSULE ORAL at 14:40

## 2018-04-16 NOTE — PROGRESS NOTES
Progress Note - Cardiology     Pepe Narayanan 80 y o  male MRN: [id number]  Unit/Bed#: [facility]-01 Encounter: [id number]      Subjective:   Patient underwent LHC today without intervention  Patient reports that he feels well and is eager for discharge  Objective:   Vitals:  Vitals:    04/16/18 1330   BP: 141/58   Pulse: (!) 51   Resp: 18   Temp:    SpO2: 98%       Body mass index is 24 84 kg/m²  Systolic (12XTN), QPU:826 , Min:133 , OYM:289     Diastolic (94JME), KIK:70, Min:58, Max:74    No intake or output data in the 24 hours ending 04/16/18 1521  Weight (last 2 days)     Date/Time   Weight    04/15/18 0725  69 8 (153 88)              PHYSICAL EXAM:  General: Patient is not in acute distress  Laying comfortably in the bed  Head: Normocephalic  Atraumatic  HEENT: Both pupils normal sized, round and reactive to light  Nonicteric  Neck: JVP not raised  Trachea central    Respiratory: Bilateral bronchovascular breath sounds with no crackles or rhonchi  Cardiovascular: RRR  S1 and S2 normal  No murmur, rub or gallop  GI: Abdomen soft, nontender  No guarding or rigidity  Neurologic: Patient is awake, alert, responding to command   Moving all extremities  Integumentary:  No skin rash  Extremities: No clubbing, cyanosis or edema    LABORATORY RESULTS:    Results from last 7 days  Lab Units 04/15/18  1946 04/15/18  1110 04/15/18  0754   TROPONIN I ng/mL 0 31* 0 26* 0 17*     CBC with diff:   Results from last 7 days  Lab Units 04/16/18  0334 04/15/18  1201 04/15/18  0754   WBC Thousand/uL 6 85 5 69 6 18   HEMOGLOBIN g/dL 11 0* 11 3* 11 9*   HEMATOCRIT % 34 2* 35 0* 37 3   MCV fL 96 96 98   PLATELETS Thousands/uL 203 224 249   MCH pg 30 8 31 1 31 2   MCHC g/dL 32 2 32 3 31 9   RDW % 14 0 14 0 14 1   MPV fL 9 0 9 4 9 3   NRBC AUTO /100 WBCs  --   --  0       CMP:  Results from last 7 days  Lab Units 04/16/18  0334 04/15/18  0754   SODIUM mmol/L 139 140   POTASSIUM mmol/L 4 2 4 1   CHLORIDE mmol/L 106 106   CO2 mmol/L 26 26   ANION GAP mmol/L 7 8   BUN mg/dL 26* 20   CREATININE mg/dL 1 17 1 30   GLUCOSE RANDOM mg/dL 91 111   CALCIUM mg/dL 8 6 8 5   AST U/L  --  15   ALT U/L  --  15   ALK PHOS U/L  --  85   TOTAL PROTEIN g/dL  --  6 3*   BILIRUBIN TOTAL mg/dL  --  0 30   EGFR ml/min/1 73sq m 56 49       BMP:  Results from last 7 days  Lab Units 18  0334 04/15/18  0754   SODIUM mmol/L 139 140   POTASSIUM mmol/L 4 2 4 1   CHLORIDE mmol/L 106 106   CO2 mmol/L 26 26   BUN mg/dL 26* 20   CREATININE mg/dL 1 17 1 30   GLUCOSE RANDOM mg/dL 91 111   CALCIUM mg/dL 8 6 8 5                            Results from last 7 days  Lab Units 18  0334   TSH 3RD GENERATON uIU/mL 5 808*         Results from last 7 days  Lab Units 18  0334 04/15/18  1258   INR  1 07 1 12       Lipid Profile:   Lab Results   Component Value Date    CHOL 149 04/15/2018     Lab Results   Component Value Date    HDL 44 04/15/2018     Lab Results   Component Value Date    LDLCALC 91 04/15/2018     Lab Results   Component Value Date    TRIG 70 04/15/2018       Cardiac testing:   Results for orders placed during the hospital encounter of 04/15/18   Echo complete with contrast if indicated    Narrative [address] [place] [address] [id number] [phone]    Transthoracic Echocardiogram  2D, M-mode, Doppler, and Color Doppler    Study date:  2018    Patient: Shonda Smith  MR number: [id number]  Account number: [de-identified]  : 06-Sep-1930  Age: 80 years  Gender: Male  Status: Inpatient  Location: Bedside  Height: 66 in  Weight: 152 9 lb  BP: 152/ 70 mmHg    Indications: Chest Pain    Diagnoses: R07 9 - Chest pain, unspecified    Sonographer:  ZULLY Marcus,RDCS  Interpreting Physician:  Jose Ramon Vaz MD  Referring Physician:  Renan Patton PA-C  Group:  [facility]    SUMMARY    LEFT VENTRICLE:  Ejection fraction was estimated to be 60 %    There were no regional wall motion abnormalities  Concentric hypertrophy was present  RIGHT VENTRICLE:  Estimated peak pressure was at least 25 mmHg  LEFT ATRIUM:  The atrium was mildly to moderately dilated  MITRAL VALVE:  There was moderate to severe regurgitation  TRICUSPID VALVE:  There was mild to moderate regurgitation  PULMONIC VALVE:  There was trace regurgitation  HISTORY: PRIOR HISTORY: NSTEMI, Chest Pain, Coronary Artery Disease    PROCEDURE: The procedure was performed at the bedside  This was a routine study  The transthoracic approach was used  The study included complete 2D imaging, M-mode, complete spectral Doppler, and color Doppler  The heart rate was 47 bpm,  at the start of the study  Images were obtained from the parasternal, apical, subcostal, and suprasternal notch acoustic windows  Image quality was adequate  LEFT VENTRICLE: Size was normal  Ejection fraction was estimated to be 60 %  There were no regional wall motion abnormalities  Concentric hypertrophy was present  DOPPLER: There was an increased relative contribution of atrial contraction  to ventricular filling  RIGHT VENTRICLE: The size was normal  Systolic function was normal  Wall thickness was normal  DOPPLER: Estimated peak pressure was at least 25 mmHg  LEFT ATRIUM: The atrium was mildly to moderately dilated  RIGHT ATRIUM: Size was normal     MITRAL VALVE: There was annular calcification  DOPPLER: There was moderate to severe regurgitation  AORTIC VALVE: The valve was probably trileaflet  Leaflets exhibited mild calcification  The valve was not well visualized  DOPPLER: There was no evidence for stenosis  TRICUSPID VALVE: The valve structure was normal  There was normal leaflet separation  DOPPLER: The transtricuspid velocity was within the normal range  There was no evidence for stenosis  There was mild to moderate regurgitation      PULMONIC VALVE: Leaflets exhibited normal thickness, no calcification, and normal cuspal separation  DOPPLER: The transpulmonic velocity was within the normal range  There was trace regurgitation  PERICARDIUM: There was no pericardial effusion  The pericardium was normal in appearance  AORTA: The root exhibited normal size  SYSTEM MEASUREMENT TABLES    2D  %FS: 27 3 %  Ao Diam: 3 cm  EDV(Teich): 103 6 ml  EF(Teich): 53 1 %  ESV(Teich): 48 6 ml  IVSd: 1 3 cm  LA Area: 20 9 cm2  LA Diam: 4 6 cm  LVEDV MOD A4C: 91 4 ml  LVEF MOD A4C: 59 5 %  LVESV MOD A4C: 37 ml  LVIDd: 4 7 cm  LVIDs: 3 4 cm  LVLd A4C: 7 9 cm  LVLs A4C: 7 3 cm  LVPWd: 1 3 cm  RA Area: 11 4 cm2  RVIDd: 2 8 cm  SV MOD A4C: 54 4 ml  SV(Teich): 55 1 ml    CW  MR VTI: 245 2 cm  MR Vmax: 5 5 m/s  MR Vmean: 4 4 m/s  MR maxP 3 mmHg  MR meanP 9 mmHg  TR Vmax: 2 4 m/s  TR maxP mmHg    MM  TAPSE: 1 8 cm    PW  E': 0 1 m/s  E/E': 10  MV A Jese: 0 5 m/s  MV Dec Orange: 3 1 m/s2  MV DecT: 212 1 ms  MV E Jese: 0 7 m/s  MV E/A Ratio: 1 4  MV PHT: 61 5 ms  MVA By PHT: 3 6 cm2    [facility]    Prepared and electronically signed by    Mariah Travis MD  Signed 2018 14:11:03           Telemetry Review: sinus bradycardia rates >40 bpm      Meds/Allergies   all current active meds have been reviewed  Prescriptions Prior to Admission   Medication    aspirin (ECOTRIN LOW STRENGTH) 81 mg EC tablet    cyanocobalamin 1000 MCG tablet    levothyroxine (SYNTHROID) 88 mcg tablet    metoprolol succinate (TOPROL-XL) 25 mg 24 hr tablet    nitroglycerin (NITROSTAT) 0 4 mg SL tablet    ramipril (ALTACE) 5 mg capsule    rosuvastatin (CRESTOR) 20 MG tablet              Assessment and Plan:  Chest pain, history of CAD s/p CABG (LIMA to LAD, SVG to 2nd diagonal, SVG to 1st obtuse marginal, SVG to distal RCA)  -cardiac catheterization showed diffuse native coronary artery disease  LIMA to LAD as well as SVG to 2nd diagonal were patent    However SVG to 1st obtuse marginal and SVG to distal RCA were occluded  -no interventions were performed    NSTEMI type 1, troponin 0 17/0 26/0 31   -Echo showed  EF 60%, no RWMA, concentric hypertrophy, estimated peak PA pressure 25 mmHg, mild to moderately dilated LA, moderate to severe MR, mild to moderate TR  -LHC showed diffuse disease which was not amendable to PCI  -On medical management with ASA, atorvastatin, Toprol, ramipril (was on Lopressor 50 mg b i d , however switch to Toprol 25 mg daily due to bradycardia)  -would hold off on Plavix due to patient's advanced age making him high risk for bleeding    Monitor patient overnight  Plan on discharge tomorrow if asymptomatic and heart rate is stable  ** Please Note: Dragon 360 Dictation voice to text software may have been used in the creation of this document   **

## 2018-04-16 NOTE — PROGRESS NOTES
[facility] Internal Medicine Progress Note  Patient: Ministerio Landaverde 80 y o  male   MRN: [id number]  PCP: No primary care provider on file  Unit/Bed#: -01 Encounter: [id number]  Date Of Visit: 04/16/18    Assessment:    Principal Problem:    NSTEMI (non-ST elevated myocardial infarction) [facility])  Active Problems:    Chest pain      Plan:    # Angina/NSTEMI  - s/p cardiac cath with diffuse disease, graft to mid LAD nl, occluded graft to 1st obtuse margin and RCA; recommended medical management, no stent placement  - patient is presently chest pain-free  - called his previous primary care physician in [place] and obtained complete med list patient actually already on appropriate cardiac medications including beta-blocker/Ace/statin/aspirin  - s/p echo with preserved ef of 60%  - patient educated at length on smoking cessation and importance of compliance with medical therapy and follow-up  - Monitor overnight per cardiology    # Moderate to severe MR - asymptomatic    # HTN - better controlled, cont meds    # Bradycardiac - asymptomatic; could be due to poorly controlled hypothyrodism d/t poor compliance  Was initially on metoprolol 50mg bid; obtained med list from PCP on toprol xl 25 qd  Will resume with holding parameters  Cont to monitor to ensure remains stable    # Hypothyroidism - elevated TSH however patient admits is not consistent with taking his meds thus will cont at present dose (as confirmed from obtained med list from his pcp in [facility])  Repeat TSH in 6-8 weeks    VTE Pharmacologic Prophylaxis:   Pharmacologic: Heparin  Mechanical VTE Prophylaxis in Place: Yes    Patient Centered Rounds: I have performed bedside rounds with nursing staff today  Discussions with Specialists or Other Care Team Provider:     Education and Discussions with Family / Patient: Patient; called his son Estrellita Velázquez no answer thus left msg    Time Spent for Care: 30 minutes    More than 50% of total time spent on counseling and coordination of care as described above  Current Length of Stay: 1 day(s)    Current Patient Status: Inpatient   Certification Statement: The patient will continue to require additional inpatient hospital stay due to post cardia cath, angina, bradycardiac    Discharge Plan / Estimated Discharge Date: D/c planning hopefully tomorrow if ok per cardiology    Code Status: Level 1 - Full Code      Subjective:   Patient seen and examined at bedside  S/p cardiac cath; denies cp or assn symptoms; actually looking forward to being discharged  On monitor bradycardiac though asymptomatic    Objective:     Vitals:   Temp (24hrs), Av 7 °F (36 5 °C), Min:97 4 °F (36 3 °C), Max:97 9 °F (36 6 °C)    HR:  [49-63] 51  Resp:  [18] 18  BP: (133-167)/(58-74) 141/58  SpO2:  [94 %-98 %] 98 %  Body mass index is 24 84 kg/m²  Input and Output Summary (last 24 hours):     No intake or output data in the 24 hours ending 18 1431    Physical Exam:     Physical Exam   Constitutional: He is oriented to person, place, and time  He appears well-developed and well-nourished  Neck: Neck supple  Cardiovascular: Regular rhythm, normal heart sounds and intact distal pulses  Exam reveals no gallop and no friction rub  No murmur heard  Bradycardic   Pulmonary/Chest: Effort normal and breath sounds normal  No respiratory distress  He has no wheezes  He has no rales  He exhibits no tenderness  Abdominal: Soft  Bowel sounds are normal  He exhibits no distension and no mass  There is no tenderness  There is no rebound and no guarding  Musculoskeletal: Normal range of motion  He exhibits no edema, tenderness or deformity  Neurological: He is alert and oriented to person, place, and time  He has normal reflexes  He displays normal reflexes  No cranial nerve deficit  He exhibits normal muscle tone  Coordination normal    Skin: Skin is warm and dry  No rash noted  No erythema  No pallor         Additional Data: Labs:      Results from last 7 days  Lab Units 04/16/18  0334  04/15/18  0754   WBC Thousand/uL 6 85  < > 6 18   HEMOGLOBIN g/dL 11 0*  < > 11 9*   HEMATOCRIT % 34 2*  < > 37 3   PLATELETS Thousands/uL 203  < > 249   NEUTROS PCT %  --   --  63   LYMPHS PCT %  --   --  22   MONOS PCT %  --   --  11   EOS PCT %  --   --  3   < > = values in this interval not displayed  Results from last 7 days  Lab Units 04/16/18  0334 04/15/18  0754   SODIUM mmol/L 139 140   POTASSIUM mmol/L 4 2 4 1   CHLORIDE mmol/L 106 106   CO2 mmol/L 26 26   BUN mg/dL 26* 20   CREATININE mg/dL 1 17 1 30   CALCIUM mg/dL 8 6 8 5   TOTAL PROTEIN g/dL  --  6 3*   BILIRUBIN TOTAL mg/dL  --  0 30   ALK PHOS U/L  --  85   ALT U/L  --  15   AST U/L  --  15   GLUCOSE RANDOM mg/dL 91 111       Results from last 7 days  Lab Units 04/16/18  0334   INR  1 07       * I Have Reviewed All Lab Data Listed Above  * Additional Pertinent Lab Tests Reviewed:  All Labs Within Last 24 Hours Reviewed    Imaging:    Imaging Reports Reviewed Today Include: Cardiac cath  Imaging Personally Reviewed by Myself Includes:      Recent Cultures (last 7 days):           Last 24 Hours Medication List:     Current Facility-Administered Medications:  acetaminophen 650 mg Oral Q6H PRN Esmond Acron, DO    [START ON 4/17/2018] aspirin 81 mg Oral Daily Esmond Acron, DO    atorvastatin 40 mg Oral Daily With Dinner Esmond Acron, DO    cyanocobalamin 100 mcg Oral Daily Esmond Acron, DO    hydrALAZINE 10 mg Intravenous Q6H PRN Esmond Acron, DO    levothyroxine 88 mcg Oral Daily Esmond Acron, DO    metoprolol succinate 25 mg Oral Daily Esmond Acron, DO    nitroglycerin 0 4 mg Sublingual Q5 Min PRN Esmond Acron, DO    ondansetron 4 mg Intravenous Q6H PRN Esmond Acron, DO    ramipril 5 mg Oral Daily Esmond Acron, DO    sodium chloride (PF) 3 mL Intravenous PRN Serrano Reasons, DO    sodium chloride 75 mL/hr Intravenous Continuous Olivia SARAVIA Yara Campbell PA-C Last Rate: 75 mL/hr (04/16/18 1101)        Today, Patient Was Seen By: Karuna Barahona DO    ** Please Note: This note has been constructed using a voice recognition system   **

## 2018-04-16 NOTE — CASE MANAGEMENT
Initial Clinical Review    Admission: Date/Time/Statement: 4/15/18 @ 0942     Orders Placed This Encounter   Procedures    Inpatient Admission (expected length of stay for this patient is greater than two midnights)     Standing Status:   Standing     Number of Occurrences:   1     Order Specific Question:   Admitting Physician     Answer:   Shivani Elliott [[id number]]     Order Specific Question:   Level of Care     Answer:   Med Surg [16]     Order Specific Question:   Estimated length of stay     Answer:   More than 2 Midnights     Order Specific Question:   Certification     Answer:   I certify that inpatient services are medically necessary for this patient for a duration of greater than two midnights  See H&P and MD Progress Notes for additional information about the patient's course of treatment  ED: Date/Time/Mode of Arrival:   ED Arrival Information     Expected Arrival Acuity Means of Arrival Escorted By Service Admission Type    - 4/15/2018 07:18 Urgent Walk-In Family Member General Medicine Urgent    Arrival Complaint    FOLLOW-UP/ CHEST PAIN        Chief Complaint:   Chief Complaint   Patient presents with    Chest Pain     pt had an episode of chest pain yesterday that lasted about 45 minutes  no pain since  pt states "i didn't think i needed to come "      History of Illness: presents to the Emergency Department complaining of chest pain that occurred yesterday while he was watching TV  Pain lasted 30-45 minutes and resolved  He has not had any pain since that time  He does have an extensive cardiac hx but has not seen a cardiologist in many years    He did not want to come to the hospital but today his son insisted          ED Vital Signs:   ED Triage Vitals [04/15/18 0725]   Temperature Pulse Respirations Blood Pressure SpO2   97 9 °F (36 6 °C) 81 16 (!) 193/85 100 %      Temp Source Heart Rate Source Patient Position - Orthostatic VS BP Location FiO2 (%)   Oral Monitor Lying Right arm -- Pain Score       No Pain        Wt Readings from Last 1 Encounters:   04/15/18 69 8 kg (153 lb 14 1 oz)       Vital Signs (abnormal):   04/15/18 1015 -- 65 --  175/88 95 % -- LM   04/15/18 1000 -- 58 -- -- 96 % -- LM   04/15/18 0930 -- 73 16  197/153 98 %           Abnormal Labs/Diagnostic Test Results:   Tyrop 0 17,   0 26  Lab Units 04/15/18  1201 04/15/18  0754   WBC Thousand/uL 5 69 6 18   HEMOGLOBIN g/dL 11 3* 11 9*   HEMATOCRIT % 35 0* 37 3   PLATELETS Thousands/uL 224 249   NEUTROS PCT %  --  63   LYMPHS PCT %  --  22   MONOS PCT %  --  11   EOS PCT %  --  3      T Pro 6 3  CXR: No acute cardiopulmonary disease  EKG  NSR @ 71;  1st degree A-V block  Left ventricular hypertrophy with repolarization abnormality    ED Treatment:   Medication Administration from 04/15/2018 0718 to 04/15/2018 1133       Date/Time Order Dose Route Action Action by Comments     04/15/2018 0759 sodium chloride (PF) 0 9 % injection 3 mL 3 mL Intravenous Not Given Shruthi Shah RN IV flushed     04/15/2018 0757 aspirin chewable tablet 324 mg 324 mg Oral Given Shruthi Shah RN           Past Medical/Surgical History:    Active Ambulatory Problems     Diagnosis Date Noted    CAD (coronary artery disease) 04/15/2018     Resolved Ambulatory Problems     Diagnosis Date Noted    No Resolved Ambulatory Problems     Past Medical History:   Diagnosis Date    Arthritis     Coronary artery disease     Disease of thyroid gland     Heart disease     Hypertension        Admitting Diagnosis: Chest pain [R07 9]  NSTEMI (non-ST elevated myocardial infarction) ([facility] [address] ) [I21 4]    Age/Sex: 80 y o  male    Assessment/Plan:   Principal Problem:    NSTEMI (non-ST elevated myocardial infarction) ([facility] [address] )  Active Problems:    Chest pain        Plan for the Primary Problem(s):     # CP/NSTEMI  - Discussed with cardiology, planned for cardiac catherization tomorrow  - on heparin gtt  - cont asa, prn nitro  - Place on metoprolol and high dose statin; holding ace-I for now given borderline cr  - check lipid profile  - cont to trend troponin     Plan for Additional Problems:   # HTN urgency - secondary to non compliance  - placed on metoprolol, add prn IV hydralazine  - Unknown med list waiting to obtain list from his PCP in [place]  - cont to monitor     # Hypothyroidism - unknown dose of levothyroxine  - check tsh to ensure in euthyroid state     Disp: Plan of care discussed with patient at length  Code status addressed he is full code  VTE Prophylaxis: Heparin Drip  / sequential compression device   Code Status: Full code  POLST: There is no POLST form on file for this patient (pre-hospital)     Anticipated Length of Stay:  Patient will be admitted on an Inpatient basis with an anticipated length of stay of  > 2 midnights     Justification for Hospital Stay: NSTEMI    Admission Orders: IP  TELE  Consult Cardio  ECHO  Cardiac Cath 4/16  SCD's  CBC,  BMP,  TSH  EKG    Scheduled Meds:   Current Facility-Administered Medications:  acetaminophen 650 mg Oral Q6H PRN Jewel Catena, DO   [START ON 4/17/2018] aspirin 81 mg Oral Daily Jewel Catena, DO   atorvastatin 40 mg Oral Daily With Dinner Jewel Catena, DO   cyanocobalamin 100 mcg Oral Daily Jewel Catena, DO   [START ON 4/17/2018] heparin (porcine) 5,000 Units Subcutaneous Q8H [address] Jewel Catena, DO   hydrALAZINE 10 mg Intravenous Q6H PRN Jewel Catena, DO   levothyroxine 88 mcg Oral Daily Jewel Catena, DO   metoprolol succinate 25 mg Oral Daily Jewel Catena, DO   nitroglycerin 0 4 mg Sublingual Q5 Min PRN Jewel Catena, DO   ondansetron 4 mg Intravenous Q6H PRN Jewel Catena, DO   ramipril 5 mg Oral Daily Jewel Catena, DO   sodium chloride (PF) 3 mL Intravenous PRN El Dimmit, DO     Continuous Infusions:  Heparin Bolus and Drip  PRN Meds:   acetaminophen    hydrALAZINE    nitroglycerin    ondansetron    Insert peripheral IV **AND** sodium chloride (PF)    4/16:  Cath: Mid LAD: There was a 100 % stenosis  1st diagonal: There was a 70 % stenosis  Circumflex: The vessel was normal sized  Angiography showed severe atherosclerosis  Proximal circumflex: There was a tubular 80 % stenosis  2nd obtuse marginal: There was a tubular 90 % stenosis  This lesion is a likely culprit for the patient's recent myocardial infarction  Mid RCA: There was a 100 % stenosis  graft to mid LAD nl, occluded graft to 1st obtuse margin and RCA; recommended medical management, no stent placement  Thank you,  [facility] in the [employer] by Shayne Rodríguez for 2017  Network Utilization Review Department  Phone: [phone]; Fax [phone]  ATTENTION: The Network Utilization Review Department is now centralized for our 7 Facilities  Make a note that we have a new phone and fax numbers for our Department  Please call with any questions or concerns to [phone] and carefully follow the prompts so that you are directed to the right person  All voicemails are confidential  Fax any determinations, approvals, denials, and requests for initial or continue stay review clinical to [phone]  Due to HIGH CALL volume, it would be easier if you could please send faxed requests to expedite your requests and in part, help us provide discharge notifications faster

## 2018-04-17 VITALS
BODY MASS INDEX: 24.73 KG/M2 | HEART RATE: 59 BPM | HEIGHT: 66 IN | RESPIRATION RATE: 18 BRPM | OXYGEN SATURATION: 96 % | SYSTOLIC BLOOD PRESSURE: 118 MMHG | WEIGHT: 153.88 LBS | DIASTOLIC BLOOD PRESSURE: 75 MMHG | TEMPERATURE: 98.1 F

## 2018-04-17 PROBLEM — I10 HYPERTENSION: Status: ACTIVE | Noted: 2018-04-17

## 2018-04-17 PROBLEM — E03.9 HYPOTHYROID: Status: ACTIVE | Noted: 2018-04-17

## 2018-04-17 PROCEDURE — 99238 HOSP IP/OBS DSCHRG MGMT 30/<: CPT | Performed by: INTERNAL MEDICINE

## 2018-04-17 PROCEDURE — 99232 SBSQ HOSP IP/OBS MODERATE 35: CPT | Performed by: INTERNAL MEDICINE

## 2018-04-17 RX ORDER — ATORVASTATIN CALCIUM 40 MG/1
40 TABLET, FILM COATED ORAL
Qty: 30 TABLET | Refills: 0 | Status: SHIPPED | OUTPATIENT
Start: 2018-04-17 | End: 2018-04-25 | Stop reason: SDUPTHER

## 2018-04-17 RX ADMIN — HEPARIN SODIUM 5000 UNITS: 5000 INJECTION, SOLUTION INTRAVENOUS; SUBCUTANEOUS at 05:13

## 2018-04-17 RX ADMIN — RAMIPRIL 5 MG: 5 CAPSULE ORAL at 08:44

## 2018-04-17 RX ADMIN — LEVOTHYROXINE SODIUM 88 MCG: 88 TABLET ORAL at 08:44

## 2018-04-17 RX ADMIN — ASPIRIN 81 MG: 81 TABLET, COATED ORAL at 08:44

## 2018-04-17 RX ADMIN — METOPROLOL SUCCINATE 25 MG: 25 TABLET, EXTENDED RELEASE ORAL at 08:44

## 2018-04-17 NOTE — ASSESSMENT & PLAN NOTE
TSH was slightly elevated on admission  Patient reported noncompliance in taking medications    Will not make any changes right now, patient needs close outpatient follow-up  Continue home dose of levothyroxine

## 2018-04-17 NOTE — DISCHARGE SUMMARY
Discharge- Kemar Celestin 9/6/1930, 80 y o  male MRN: [id number]    Unit/Bed#: -01 Encounter: [id number]    Primary Care Provider: No primary care provider on file  Date and time admitted to hospital: 4/15/2018  7:22 AM        * NSTEMI (non-ST elevated myocardial infarction) [facility])   Assessment & Plan    cardiac cath with diffuse disease, graft to mid LAD nl, occluded graft to 1st obtuse margin and RCA; recommended medical management, no stent placement  - patient is presently chest pain-free  -echocardiogram showed ejection fraction of 60% but moderate to severe MR  -counseled patient on importance of medication compliance, smoking cessation  -close outpatient cardiology follow-up after discharge        Hypertension   Assessment & Plan    Blood pressure is stable  Continue home medications  Patient had bradycardia, metoprolol dose was decreased to 25 mg daily        Hypothyroid   Assessment & Plan    TSH was slightly elevated on admission  Patient reported noncompliance in taking medications  Will not make any changes right now, patient needs close outpatient follow-up  Continue home dose of levothyroxine              Discharging Physician / Practitioner: Ellen Natarajan MD  PCP: No primary care provider on file  Admission Date:   Admission Orders     Ordered        04/15/18 5545  Inpatient Admission (expected length of stay for this patient is greater than two midnights)  Once             Discharge Date: 04/17/18    Resolved Problems  Date Reviewed: 4/17/2018    None          Consultations During Hospital Stay  Cardiology    Procedures Performed:   Cardiac catheterization-diffuse coronary artery disease, not amenable for coronary intervention  Significant Findings / Test Results:     Chest x-ray- No acute cardiopulmonary disease  Echocardiogram-ejection fraction 60%, moderate to severe MR    Incidental Findings:   · None    Test Results Pending at Discharge (will require follow up):    · None Outpatient Tests Requested:  · None    Complications:  None    Reason for Admission:  Chest pain/NSTEMI    Hospital Course:     Karlo Julian is a 80 y o  male patient with history of coronary artery disease status post CABG, hypertension, hyperlipidemia who originally presented to the hospital on 4/15/2018 due to chest pain/heaviness in the chest   Patient reported noncompliance with medications and has not seen a cardiologist in several years  On admission patient's EKG showed some ischemic changes in the lateral leads, had elevated troponins as well  Cardiac catheterization was performed and showed diffuse coronary artery disease not amenable for coronary intervention  Cardiology recommended optimization of medical therapy given the patient's advanced age  Patient to continue aspirin, statin, beta-blocker  Echocardiogram showed ejection fraction of 60% but moderate to severe mitral regurgitation  Patient's chest pain completely resolved, did not have any other complaints  He was recommended to continue medical management and establish care with a family doctor and cardiologist as outpatient  Please see above list of diagnoses and related plan for additional information  Condition at Discharge: stable     Discharge Day Visit / Exam:     Subjective:  Patient seen and examined  Denies any complaints at this time  Denies any pain  He is ready to go home, and requesting me to discharging soon  Vitals: Blood Pressure: 118/75 (04/17/18 1100)  Pulse: 59 (04/17/18 1100)  Temperature: 98 1 °F (36 7 °C) (04/17/18 1100)  Temp Source: Oral (04/17/18 1100)  Respirations: 18 (04/17/18 1100)  Height: 5' 6" (167 6 cm) (04/16/18 1608)  Weight - Scale: 69 8 kg (153 lb 14 1 oz) (04/15/18 0725)  SpO2: 96 % (04/17/18 1100)  Exam:   Physical Exam   Constitutional: He is oriented to person, place, and time  He appears well-developed and well-nourished  HENT:   Head: Normocephalic and atraumatic     Eyes: EOM are normal  Pupils are equal, round, and reactive to light  Neck: Normal range of motion  Neck supple  Cardiovascular: Normal rate  Murmur heard  Pulmonary/Chest: Effort normal and breath sounds normal  No respiratory distress  Abdominal: Soft  Bowel sounds are normal    Musculoskeletal: Normal range of motion  Neurological: He is alert and oriented to person, place, and time  Skin: Skin is warm and dry  Discussion with Family:  Discussed with patient and his son at the bedside    Discharge instructions/Information to patient and family:   See after visit summary for information provided to patient and family  Provisions for Follow-Up Care:  See after visit summary for information related to follow-up care and any pertinent home health orders  Disposition:     Home    For Discharges to [facility] SNF:   · Not Applicable to this Patient - Not Applicable to this Patient    Planned Readmission:  None     Discharge Statement:  I spent 25 minutes discharging the patient  This time was spent on the day of discharge  I had direct contact with the patient on the day of discharge  Greater than 50% of the total time was spent examining patient, answering all patient questions, arranging and discussing plan of care with patient as well as directly providing post-discharge instructions  Additional time then spent on discharge activities  Discharge Medications:  See after visit summary for reconciled discharge medications provided to patient and family        ** Please Note: This note has been constructed using a voice recognition system **

## 2018-04-17 NOTE — ASSESSMENT & PLAN NOTE
Blood pressure is stable  Continue home medications  Patient had bradycardia, metoprolol dose was decreased to 25 mg daily

## 2018-04-17 NOTE — PLAN OF CARE
Problem: DISCHARGE PLANNING - CARE MANAGEMENT  Goal: Discharge to post-acute care or home with appropriate resources  INTERVENTIONS:  - Conduct assessment to determine patient/family and health care team treatment goals, and need for post-acute services based on payer coverage, community resources, and patient preferences, and barriers to discharge  - Address psychosocial, clinical, and financial barriers to discharge as identified in assessment in conjunction with the patient/family and health care team  - Arrange appropriate level of post-acute services according to patients   needs and preference and payer coverage in collaboration with the physician and health care team  - Communicate with and update the patient/family, physician, and health care team regarding progress on the discharge plan  - Arrange appropriate transportation to post-acute venues  Outcome: Progressing  CM met with pt at bedside  Pt lives alone but has family near by for support  Pt lives in a one level home with 4 steps to enter the home  Pt denies hx of DME/Oxygen, Rehab, HHC, MH and Substance abuse   Pt uses [facility] in [place]  Pt's son Santosh Maldonado is POA/AD  Pt is retired and still able to drive himself to [employer]  Pt's family will transport pt home when ready to discharge  CM will follow pt through discharge process  Pt has no other needs

## 2018-04-17 NOTE — PHYSICIAN ADVISOR
Current patient class: Inpatient  The patient is currently on Hospital Day: 2      The patient was admitted to the hospital at 2077 on 4/15/18 for the following diagnosis:  Chest pain [R07 9]  NSTEMI (non-ST elevated myocardial infarction) ([facility] [address] ) [I21 4]       There is documentation in the medical record of an expected length of stay of at least 2 midnights  The patient is therefore expected to satisfy the 2 midnight benchmark and given the 2 midnight presumption is appropriate for INPATIENT ADMISSION  Given this expectation of a satisfying stay, CMS instructs us that the patient is most often appropriate for inpatient admission under part A provided medical necessity is documented in the chart  After review of the relevant documentation, labs, vital signs and test results, the patient is appropriate for INPATIENT ADMISSION  Admission to the hospital as an inpatient is a complex decision making process which requires the practitioner to consider the patients presenting complaint, history and physical examination and all relevant testing  With this in mind, in this case, the patient was deemed appropriate for INPATIENT ADMISSION  After review of the documentation and testing available at the time of the admission I concur with this clinical determination of medical necessity  Rationale is as follows: The patient is a 80 yrs old Male who presented to the ED at 4/15/2018  7:22 AM with a chief complaint of Chest Pain (pt had an episode of chest pain yesterday that lasted about 45 minutes  no pain since  pt states "i didn't think i needed to come " ) patient was admitted to the hospital with chest pain, non ST elevation myocardial infarction  Patient was placed on a heparin drip, and was documented to require at least 2 midnights in the hospital   The patient was taken the catheterization lab, did not have intervention performed    The patient will remain hospitalized for a 2nd midnight tonight, and given the documentation present at the time of admission is a appropriate for inpatient admission  The patients vitals on arrival were ED Triage Vitals [04/15/18 0725]   Temperature Pulse Respirations Blood Pressure SpO2   97 9 °F (36 6 °C) 81 16 (!) 193/85 100 %      Temp Source Heart Rate Source Patient Position - Orthostatic VS BP Location FiO2 (%)   Oral Monitor Lying Right arm --      Pain Score       No Pain           Past Medical History:   Diagnosis Date    Arthritis     Coronary artery disease     Disease of thyroid gland     Heart disease     Hypertension      Past Surgical History:   Procedure Laterality Date    CARDIAC SURGERY      HERNIA REPAIR             Consults have been placed to:   IP CONSULT TO CARDIOLOGY    Vitals:    04/16/18 1530 04/16/18 1608 04/16/18 1630 04/16/18 1900   BP: 128/60  134/59 130/75   BP Location: Left arm  Left arm Left arm   Pulse: (!) 54  55 56   Resp: 18  18 18   Temp:    98 1 °F (36 7 °C)   TempSrc:    Oral   SpO2: 96%  98% 98%   Weight:       Height:  5' 6" (1 676 m)         Most recent labs:    Recent Labs      04/15/18   0754   04/15/18   1946  04/16/18   0334   WBC  6 18   < >   --   6 85   HGB  11 9*   < >   --   11 0*   HCT  37 3   < >   --   34 2*   PLT  249   < >   --   203   K  4 1   --    --   4 2   NA  140   --    --   139   CALCIUM  8 5   --    --   8 6   BUN  20   --    --   26*   CREATININE  1 30   --    --   1 17   LIPASE  143   --    --    --    INR   --    < >   --   1 07   TROPONINI  0 17*   < >  0 31*   --    AST  15   --    --    --    ALT  15   --    --    --    ALKPHOS  85   --    --    --    BILITOT  0 30   --    --    --     < > = values in this interval not displayed         Scheduled Meds:  Current Facility-Administered Medications:  acetaminophen 650 mg Oral Q6H PRN Tara Perez DO   [START ON 4/17/2018] aspirin 81 mg Oral Daily Tara Perez DO   atorvastatin 40 mg Oral Daily With [address], DO   cyanocobalamin 100 mcg Oral Daily Geraldo Jumper, DO   [START ON 4/17/2018] heparin (porcine) 5,000 Units Subcutaneous [facility] Geraldo Jumper, DO   hydrALAZINE 10 mg Intravenous Q6H PRN Geraldo Jumper, DO   levothyroxine 88 mcg Oral Daily Geraldo Jumper, DO   metoprolol succinate 25 mg Oral Daily Geraldo Jumper, DO   nitroglycerin 0 4 mg Sublingual Q5 Min PRN Geraldo Jumper, DO   ondansetron 4 mg Intravenous Q6H PRN Geraldo Jumper, DO   ramipril 5 mg Oral Daily Geraldo Jumper, DO   sodium chloride (PF) 3 mL Intravenous PRN Benjamen [place], DO     Continuous Infusions:   PRN Meds:   acetaminophen    hydrALAZINE    nitroglycerin    ondansetron    Insert peripheral IV **AND** sodium chloride (PF)    Surgical procedures (if appropriate):

## 2018-04-17 NOTE — SOCIAL WORK
CM met with pt at bedside  Pt lives alone but has family near by for support  Pt lives in a one level home with 4 steps to enter the home  Pt denies hx of DME/Oxygen, Rehab, HHC, MH and Substance abuse   Pt uses [facility] in [facility]  Pt's son Robert Reyna is POA/AD  Pt is retired and still able to drive himself to [employer]  Pt's family will transport pt home when ready to discharge  CM will follow pt through discharge process  Pt has no other needs

## 2018-04-17 NOTE — ASSESSMENT & PLAN NOTE
cardiac cath with diffuse disease, graft to mid LAD nl, occluded graft to 1st obtuse margin and RCA; recommended medical management, no stent placement  - patient is presently chest pain-free  -echocardiogram showed ejection fraction of 60% but moderate to severe MR  -counseled patient on importance of medication compliance, smoking cessation  -close outpatient cardiology follow-up after discharge

## 2018-04-17 NOTE — PROGRESS NOTES
General Cardiology   Progress Note   Roger Perdue 80 y o  male MRN: [id number]  Unit/Bed#: -01 Encounter: [id number]      SUBJECTIVE:   No significant events overnight  Doing well s/p cardiac catheterization  Denies chest pain, SOB, lightheadedness, palpitations, leg swelling, syncope  OBJECTIVE:   Vitals:  Vitals:    04/17/18 0700   BP: 159/76   Pulse: 62   Resp: 18   Temp: 97 5 °F (36 4 °C)   SpO2: 94%     Body mass index is 24 84 kg/m²  Systolic (43QEB), ELW:887 , Min:128 , HWF:928     Diastolic (55GZD), GMF:81, Min:58, Max:76      Intake/Output Summary (Last 24 hours) at 04/17/18 1013  Last data filed at 04/17/18 0841   Gross per 24 hour   Intake              442 ml   Output              200 ml   Net              242 ml     Weight (last 2 days)     Date/Time   Weight    04/15/18 0725  69 8 (153 88)              Telemetry Review: No significant arrhythmias seen on telemetry review  PHYSICAL EXAMS:  General:  Patient is not in acute distress, laying in the bed comfortably, awake, alert responding to commands  Head: Normocephalic, Atraumatic  HEENT:  Both pupils normal-size atraumatic, normocephalic, nonicteric  Neck:  JVP not raised  Trachea central  Respiratory:  Bronchovascular breathing all over the chest without any accompaniment  Cardiovascular:  RRR , no murmurs rubs or gallops  GI:  Abdomen soft nontender   Liver and spleen normal size  Lymphatic:  No cervical or inguinal lymphadenopathy  Neurologic:  Patient is awake alert, responding to command, well-oriented to time and place and person moving     LABORATORY RESULTS:    Results from last 7 days  Lab Units 04/15/18  1946 04/15/18  1110 04/15/18  0754   TROPONIN I ng/mL 0 31* 0 26* 0 17*       CBC with diff:   Results from last 7 days  Lab Units 04/16/18  0334 04/15/18  1201 04/15/18  0754   WBC Thousand/uL 6 85 5 69 6 18   HEMOGLOBIN g/dL 11 0* 11 3* 11 9*   HEMATOCRIT % 34 2* 35 0* 37 3   MCV fL 96 96 98   PLATELETS Thousands/uL 203 224 249   MCH pg 30 8 31 1 31 2   MCHC g/dL 32 2 32 3 31 9   RDW % 14 0 14 0 14 1   MPV fL 9 0 9 4 9 3   NRBC AUTO /100 WBCs  --   --  0       CMP:  Results from last 7 days  Lab Units 18  0334 04/15/18  0754   SODIUM mmol/L 139 140   POTASSIUM mmol/L 4 2 4 1   CHLORIDE mmol/L 106 106   CO2 mmol/L 26 26   ANION GAP mmol/L 7 8   BUN mg/dL 26* 20   CREATININE mg/dL 1 17 1 30   GLUCOSE RANDOM mg/dL 91 111   CALCIUM mg/dL 8 6 8 5   AST U/L  --  15   ALT U/L  --  15   ALK PHOS U/L  --  85   TOTAL PROTEIN g/dL  --  6 3*   BILIRUBIN TOTAL mg/dL  --  0 30   EGFR ml/min/1 73sq m 56 49       BMP:  Results from last 7 days  Lab Units 18  0334 04/15/18  0754   SODIUM mmol/L 139 140   POTASSIUM mmol/L 4 2 4 1   CHLORIDE mmol/L 106 106   CO2 mmol/L 26 26   BUN mg/dL 26* 20   CREATININE mg/dL 1 17 1 30   GLUCOSE RANDOM mg/dL 91 111   CALCIUM mg/dL 8 6 8 5                      Results from last 7 days  Lab Units 18  0334   TSH 3RD GENERATON uIU/mL 5 808*       Results from last 7 days  Lab Units 18  0334 04/15/18  1258   INR  1 07 1 12       Lipid Profile:   Lab Results   Component Value Date    CHOL 149 04/15/2018     Lab Results   Component Value Date    HDL 44 04/15/2018     Lab Results   Component Value Date    LDLCALC 91 04/15/2018     Lab Results   Component Value Date    TRIG 70 04/15/2018       Cardiac testing:  Results for orders placed during the hospital encounter of 04/15/18   Echo complete with contrast if indicated    Narrative [address] [place] [address] A [place], [place] [address] [phone]    Transthoracic Echocardiogram  2D, M-mode, Doppler, and Color Doppler    Study date:  2018    Patient: Ailyn Hendricks  MR number: [id number]  Account number: [de-identified]  : 06-Sep-1930  Age: 80 years  Gender: Male  Status: Inpatient  Location: Bedside  Height: 66 in  Weight: 152 9 lb  BP: 152/ 70 mmHg    Indications: Chest Pain    Diagnoses: R07 9 - Chest pain, unspecified    Sonographer:  ZULLY Mandujano,RDCS  Interpreting Physician:  Nikole Viveros MD  Referring Physician:  Deniz Morton PA-C  Group:  [facility]    SUMMARY    LEFT VENTRICLE:  Ejection fraction was estimated to be 60 %  There were no regional wall motion abnormalities  Concentric hypertrophy was present  RIGHT VENTRICLE:  Estimated peak pressure was at least 25 mmHg  LEFT ATRIUM:  The atrium was mildly to moderately dilated  MITRAL VALVE:  There was moderate to severe regurgitation  TRICUSPID VALVE:  There was mild to moderate regurgitation  PULMONIC VALVE:  There was trace regurgitation  HISTORY: PRIOR HISTORY: NSTEMI, Chest Pain, Coronary Artery Disease    PROCEDURE: The procedure was performed at the bedside  This was a routine study  The transthoracic approach was used  The study included complete 2D imaging, M-mode, complete spectral Doppler, and color Doppler  The heart rate was 47 bpm,  at the start of the study  Images were obtained from the parasternal, apical, subcostal, and suprasternal notch acoustic windows  Image quality was adequate  LEFT VENTRICLE: Size was normal  Ejection fraction was estimated to be 60 %  There were no regional wall motion abnormalities  Concentric hypertrophy was present  DOPPLER: There was an increased relative contribution of atrial contraction  to ventricular filling  RIGHT VENTRICLE: The size was normal  Systolic function was normal  Wall thickness was normal  DOPPLER: Estimated peak pressure was at least 25 mmHg  LEFT ATRIUM: The atrium was mildly to moderately dilated  RIGHT ATRIUM: Size was normal     MITRAL VALVE: There was annular calcification  DOPPLER: There was moderate to severe regurgitation  AORTIC VALVE: The valve was probably trileaflet  Leaflets exhibited mild calcification  The valve was not well visualized  DOPPLER: There was no evidence for stenosis      TRICUSPID VALVE: The valve structure was normal  There was normal leaflet separation  DOPPLER: The transtricuspid velocity was within the normal range  There was no evidence for stenosis  There was mild to moderate regurgitation  PULMONIC VALVE: Leaflets exhibited normal thickness, no calcification, and normal cuspal separation  DOPPLER: The transpulmonic velocity was within the normal range  There was trace regurgitation  PERICARDIUM: There was no pericardial effusion  The pericardium was normal in appearance  AORTA: The root exhibited normal size      SYSTEM MEASUREMENT TABLES    2D  %FS: 27 3 %  Ao Diam: 3 cm  EDV(Teich): 103 6 ml  EF(Teich): 53 1 %  ESV(Teich): 48 6 ml  IVSd: 1 3 cm  LA Area: 20 9 cm2  LA Diam: 4 6 cm  LVEDV MOD A4C: 91 4 ml  LVEF MOD A4C: 59 5 %  LVESV MOD A4C: 37 ml  LVIDd: 4 7 cm  LVIDs: 3 4 cm  LVLd A4C: 7 9 cm  LVLs A4C: 7 3 cm  LVPWd: 1 3 cm  RA Area: 11 4 cm2  RVIDd: 2 8 cm  SV MOD A4C: 54 4 ml  SV(Teich): 55 1 ml    CW  MR VTI: 245 2 cm  MR Vmax: 5 5 m/s  MR Vmean: 4 4 m/s  MR maxP 3 mmHg  MR meanP 9 mmHg  TR Vmax: 2 4 m/s  TR maxP mmHg    MM  TAPSE: 1 8 cm    PW  E': 0 1 m/s  E/E': 10  MV A Jese: 0 5 m/s  MV Dec Troup: 3 1 m/s2  MV DecT: 212 1 ms  MV E Jese: 0 7 m/s  MV E/A Ratio: 1 4  MV PHT: 61 5 ms  MVA By PHT: 3 6 cm2    [employer] Accredited Echocardiography Laboratory    Prepared and electronically signed by    Mikaela Betts MD  Signed 2018 14:11:03       Meds/Allergies   all current active meds have been reviewed and current meds:   Current Facility-Administered Medications   Medication Dose Route Frequency    acetaminophen (TYLENOL) tablet 650 mg  650 mg Oral Q6H PRN    aspirin (ECOTRIN LOW STRENGTH) EC tablet 81 mg  81 mg Oral Daily    atorvastatin (LIPITOR) tablet 40 mg  40 mg Oral Daily With [facility]    cyanocobalamin (VITAMIN B-12) tablet 100 mcg  100 mcg Oral Daily    heparin (porcine) subcutaneous injection 5,000 Units  5,000 Units Subcutaneous Q8H [facility]    hydrALAZINE (APRESOLINE) injection 10 mg  10 mg Intravenous Q6H PRN    levothyroxine tablet 88 mcg  88 mcg Oral Daily    metoprolol succinate (TOPROL-XL) 24 hr tablet 25 mg  25 mg Oral Daily    nitroglycerin (NITROSTAT) SL tablet 0 4 mg  0 4 mg Sublingual Q5 Min PRN    ondansetron (ZOFRAN) injection 4 mg  4 mg Intravenous Q6H PRN    ramipril (ALTACE) capsule 5 mg  5 mg Oral Daily    sodium chloride (PF) 0 9 % injection 3 mL  3 mL Intravenous PRN     Prescriptions Prior to Admission   Medication    aspirin (ECOTRIN LOW STRENGTH) 81 mg EC tablet    cyanocobalamin 1000 MCG tablet    levothyroxine (SYNTHROID) 88 mcg tablet    metoprolol succinate (TOPROL-XL) 25 mg 24 hr tablet    nitroglycerin (NITROSTAT) 0 4 mg SL tablet    ramipril (ALTACE) 5 mg capsule    rosuvastatin (CRESTOR) 20 MG tablet          ASSESSMENT & PLAN   1  Chest pain, history of CAD s/p CABG (LIMA to LAD, SVG to 2nd diagonal, SVG to 1st obtuse marginal, SVG to distal RCA)  -cardiac catheterization showed diffuse native coronary artery disease  LIMA to LAD as well as SVG to 2nd diagonal were patent  However SVG to 1st obtuse marginal and SVG to distal RCA were occluded  -no interventions were performed  -doing well S/P cardiac catheterization   -continue aspirin, statin, Toprol, ramipril     2  NSTEMI type 1, troponin 0 17/0 26/0 31   -Echo showed  EF 60%, no RWMA, concentric hypertrophy, estimated peak PA pressure 25 mmHg, mild to moderately dilated LA, moderate to severe MR, mild to moderate TR  -LHC showed diffuse disease which was not amendable to PCI  -On medical management with ASA, atorvastatin, Toprol, ramipril (was on Lopressor 50 mg b i d , however switch to Toprol 25 mg daily due to bradycardia)  -Plavix held due to patient's advanced age making him high risk for bleeding    Patient is stable from cardiac standpoint  Follow up with Dr Ann Casey in 1 week      Counseling / Coordination of Care  Total floor / unit time spent today 35 minutes  Greater than 50% of total time was spent with the patient and / or family counseling and / or coordination of care  Jung Case PA-C  4/17/2018,10:13 AM    Portions of the record may have been created with voice recognition software   Occasional wrong word or "sound a like" substitutions may have occurred due to the inherent limitations of voice recognition software   Read the chart carefully and recognize, using context, where substitutions have occurred

## 2018-04-25 ENCOUNTER — OFFICE VISIT (OUTPATIENT)
Dept: CARDIOLOGY CLINIC | Facility: CLINIC | Age: 83
End: 2018-04-25
Payer: MEDICARE

## 2018-04-25 VITALS
SYSTOLIC BLOOD PRESSURE: 142 MMHG | OXYGEN SATURATION: 98 % | WEIGHT: 150 LBS | BODY MASS INDEX: 24.11 KG/M2 | DIASTOLIC BLOOD PRESSURE: 70 MMHG | HEIGHT: 66 IN | HEART RATE: 60 BPM

## 2018-04-25 DIAGNOSIS — E03.9 HYPOTHYROIDISM, UNSPECIFIED TYPE: Primary | ICD-10-CM

## 2018-04-25 DIAGNOSIS — I25.10 CORONARY ARTERY DISEASE INVOLVING NATIVE CORONARY ARTERY OF NATIVE HEART WITHOUT ANGINA PECTORIS: Primary | Chronic | ICD-10-CM

## 2018-04-25 DIAGNOSIS — I10 ESSENTIAL HYPERTENSION: ICD-10-CM

## 2018-04-25 DIAGNOSIS — E78.2 HYPERLIPEMIA, MIXED: ICD-10-CM

## 2018-04-25 DIAGNOSIS — I05.9 MITRAL VALVE DISORDER: ICD-10-CM

## 2018-04-25 DIAGNOSIS — E78.2 MIXED HYPERLIPIDEMIA: ICD-10-CM

## 2018-04-25 PROBLEM — I21.4 NSTEMI (NON-ST ELEVATED MYOCARDIAL INFARCTION) (HCC): Status: RESOLVED | Noted: 2018-04-15 | Resolved: 2018-04-25

## 2018-04-25 PROCEDURE — 99214 OFFICE O/P EST MOD 30 MIN: CPT | Performed by: INTERNAL MEDICINE

## 2018-04-25 RX ORDER — ATORVASTATIN CALCIUM 40 MG/1
40 TABLET, FILM COATED ORAL
Qty: 90 TABLET | Refills: 3 | Status: SHIPPED | OUTPATIENT
Start: 2018-04-25

## 2018-04-25 RX ORDER — RAMIPRIL 5 MG/1
5 CAPSULE ORAL DAILY
Qty: 90 CAPSULE | Refills: 3 | Status: SHIPPED | OUTPATIENT
Start: 2018-04-25

## 2018-04-25 RX ORDER — METOPROLOL SUCCINATE 25 MG/1
25 TABLET, EXTENDED RELEASE ORAL DAILY
Qty: 90 TABLET | Refills: 3 | Status: SHIPPED | OUTPATIENT
Start: 2018-04-25

## 2018-04-25 RX ORDER — LEVOTHYROXINE SODIUM 88 UG/1
88 TABLET ORAL DAILY
Qty: 90 TABLET | Refills: 3 | Status: SHIPPED | OUTPATIENT
Start: 2018-04-25

## 2018-04-25 NOTE — PROGRESS NOTES
[facility] [place]  [address]  [place] [address]  Cardiology Follow Up    Shoshana Hudson  9/6/1930  [id number]      1  Coronary artery disease involving native coronary artery of native heart without angina pectoris     2  Essential hypertension     3  Mitral valve disorder     4  Hyperlipemia, mixed         Chief Complaint   Patient presents with    Follow-up     s/p hospital       Interval History:  Patient presents for follow-up visit  Patient was recently hospitalized at [facility] with non ST elevation myocardial infarction  Patient had a cardiac catheterization  No intervention was necessary  Patient recommendation for medication optimization  Patient also had an echocardiogram which showed moderate to severe mitral regurgitation  Patient presents for follow-up visit  Patient denies any chest pain  No shortness of breath out of the ordinary  Occasional dizziness  No history of presyncope syncope  He states that he has been compliant with all his present medications  Patient Active Problem List   Diagnosis    Chest pain    CAD (coronary artery disease)    Hypertension    Hypothyroid    Mitral valve disorder    Hyperlipemia, mixed     Past Medical History:   Diagnosis Date    Arthritis     Coronary artery disease     Disease of thyroid gland     Heart disease     Hypertension      Social History     Social History    Marital status: Single     Spouse name: N/A    Number of children: N/A    Years of education: N/A     Occupational History    Not on file       Social History Main Topics    Smoking status: Current Some Day Smoker     Types: Pipe    Smokeless tobacco: Never Used    Alcohol use Yes      Comment: socially    Drug use: No    Sexual activity: Not on file     Other Topics Concern    Not on file     Social History Narrative    No narrative on file      Family History   Problem Relation Age of Onset    No Known Problems Mother     No Known Problems Father      Past Surgical History:   Procedure Laterality Date    CARDIAC SURGERY      HERNIA REPAIR         Current Outpatient Prescriptions:     aspirin (ECOTRIN LOW STRENGTH) 81 mg EC tablet, Take 81 mg by mouth daily, Disp: , Rfl:     cyanocobalamin 1000 MCG tablet, Take 100 mcg by mouth daily, Disp: , Rfl:     nitroglycerin (NITROSTAT) 0 4 mg SL tablet, Place 0 4 mg under the tongue every 5 (five) minutes as needed for chest pain, Disp: , Rfl:     atorvastatin (LIPITOR) 40 mg tablet, Take 1 tablet (40 mg total) by mouth daily with dinner, Disp: 90 tablet, Rfl: 3    levothyroxine (SYNTHROID) 88 mcg tablet, Take 1 tablet (88 mcg total) by mouth daily, Disp: 90 tablet, Rfl: 3    metoprolol succinate (TOPROL-XL) 25 mg 24 hr tablet, Take 1 tablet (25 mg total) by mouth daily, Disp: 90 tablet, Rfl: 3    ramipril (ALTACE) 5 mg capsule, Take 1 capsule (5 mg total) by mouth daily, Disp: 90 capsule, Rfl: 3  Allergies   Allergen Reactions    Penicillins Swelling    Keflex [Cephalexin]     Prednisone Other (See Comments)     Pt states "i don't know, I break out"       Labs:  Admission on 04/15/2018, Discharged on 04/17/2018   Component Date Value    WBC 04/15/2018 6 18     RBC 04/15/2018 3 82*    Hemoglobin 04/15/2018 11 9*    Hematocrit 04/15/2018 37 3     MCV 04/15/2018 98     MCH 04/15/2018 31 2     MCHC 04/15/2018 31 9     RDW 04/15/2018 14 1     MPV 04/15/2018 9 3     Platelets 70/75/5431 249     nRBC 04/15/2018 0     Neutrophils Relative 04/15/2018 63     Lymphocytes Relative 04/15/2018 22     Monocytes Relative 04/15/2018 11     Eosinophils Relative 04/15/2018 3     Basophils Relative 04/15/2018 1     Neutrophils Absolute 04/15/2018 3 87     Lymphocytes Absolute 04/15/2018 1 38     Monocytes Absolute 04/15/2018 0 69     Eosinophils Absolute 04/15/2018 0 19     Basophils Absolute 04/15/2018 0 04     Sodium 04/15/2018 140     Potassium 04/15/2018 4 1     Chloride 04/15/2018 106     CO2 04/15/2018 26     Anion Gap 04/15/2018 8     BUN 04/15/2018 20     Creatinine 04/15/2018 1 30     Glucose 04/15/2018 111     Calcium 04/15/2018 8 5     AST 04/15/2018 15     ALT 04/15/2018 15     Alkaline Phosphatase 04/15/2018 85     Total Protein 04/15/2018 6 3*    Albumin 04/15/2018 3 2*    Total Bilirubin 04/15/2018 0 30     eGFR 04/15/2018 49     Lipase 04/15/2018 143     Troponin I 04/15/2018 0 17*    Troponin I 04/15/2018 0 26*    PTT 04/15/2018 171*    WBC 04/15/2018 5 69     RBC 04/15/2018 3 63*    Hemoglobin 04/15/2018 11 3*    Hematocrit 04/15/2018 35 0*    MCV 04/15/2018 96     MCH 04/15/2018 31 1     MCHC 04/15/2018 32 3     RDW 04/15/2018 14 0     Platelets 72/24/1647 224     MPV 04/15/2018 9 4     Protime 04/15/2018 14 7*    INR 04/15/2018 1 12     Cholesterol 04/15/2018 149     Triglycerides 04/15/2018 70     HDL, Direct 04/15/2018 44     LDL Calculated 04/15/2018 91     Non-HDL-Chol (CHOL-HDL) 04/15/2018 105     Troponin I 04/15/2018 0 31*    Ventricular Rate 04/15/2018 71     Atrial Rate 04/15/2018 71     MS Interval 04/15/2018 218     QRSD Interval 04/15/2018 88     QT Interval 04/15/2018 380     QTC Interval 04/15/2018 412     P Axis 04/15/2018 57     QRS Axis 04/15/2018 -24     T Wave [place] 04/15/2018 107     Ventricular Rate 04/15/2018 62     Atrial Rate 04/15/2018 62     MS Interval 04/15/2018 232     QRSD Interval 04/15/2018 92     QT Interval 04/15/2018 432     QTC Interval 04/15/2018 438     P Axis 04/15/2018 78     QRS Axis 04/15/2018 -26     T Wave Axis 04/15/2018 78     PTT 04/15/2018 51*    PTT 04/16/2018 84*    Sodium 04/16/2018 139     Potassium 04/16/2018 4 2     Chloride 04/16/2018 106     CO2 04/16/2018 26     Anion Gap 04/16/2018 7     BUN 04/16/2018 26*    Creatinine 04/16/2018 1 17     Glucose 04/16/2018 91     Calcium 04/16/2018 8 6     eGFR 04/16/2018 56     WBC 04/16/2018 6 85     RBC 04/16/2018 3 57*    Hemoglobin 04/16/2018 11 0*    Hematocrit 04/16/2018 34 2*    MCV 04/16/2018 96     MCH 04/16/2018 30 8     MCHC 04/16/2018 32 2     RDW 04/16/2018 14 0     Platelets 61/86/5880 203     MPV 04/16/2018 9 0     TSH 3RD GENERATON 04/16/2018 5 808*    Protime 04/16/2018 14 2     INR 04/16/2018 1 07      Imaging: X-ray Chest 2 Views    Result Date: 4/15/2018  Narrative: CHEST INDICATION:   chest pain  COMPARISON:  None EXAM PERFORMED/VIEWS:  XR CHEST PA & LATERAL  The frontal view was performed utilizing dual energy radiographic technique  FINDINGS: Cardiomediastinal silhouette appears unremarkable  The lungs are clear  No pneumothorax or pleural effusion  Osseous structures appear within normal limits for patient age  Impression: No acute cardiopulmonary disease  Workstation performed: [id number]       Review of Systems:  Review of Systems   REVIEW OF SYSTEMS:  Constitutional:  Denies fever or chills   Eyes:  Denies change in visual acuity   HENT:  Denies nasal congestion or sore throat   Respiratory:  Denies cough or shortness of breath   Cardiovascular:  Denies chest pain or edema   GI:  Denies abdominal pain, nausea, vomiting, bloody stools or diarrhea   :  Denies dysuria, frequency, difficulty in micturition and nocturia  Musculoskeletal:  Denies back pain or joint pain   Neurologic:  Denies headache, focal weakness or sensory changes   Occasional dizziness  Endocrine:  Denies polyuria or polydipsia   Lymphatic:  Denies swollen glands   Psychiatric:  Denies depression or anxiety     Physical Exam:    /70   Pulse 60   Ht 5' 6" (1 676 m)   Wt 68 kg (150 lb)   SpO2 98%   BMI 24 21 kg/m²     Physical Exam   PHYSICAL EXAM:  General:  Patient is not in acute distress   Head: Normocephalic, Atraumatic  HEENT:  Both pupils normal-size atraumatic, normocephalic, nonicteric  Neck:  JVP not raised   Trachea central  No carotid bruit  Respiratory: normal breath sounds no crackles  no rhonchi  Cardiovascular:  Regular rate and rhythm no S3   2/6 systolic murmur in the mitral area  GI:  Abdomen soft nontender  No organomegaly  Lymphatic:  No cervical or inguinal lymphadenopathy  Neurologic:  Patient is awake alert, oriented   Grossly nonfocal    Discussion/Summary:   Patient with multiple medical problems who seems to be doing reasonably well from cardiac standpoint  Previous studies reviewed with patient  Medications reviewed and possible side effects discussed  concepts of cardiovascular disease , signs and symptoms of heart disease  Dietary and risk factor modification reinforced  All questions answered  Safety measures reviewed  Patient advised to report any problems prompting medical attention  Events of recent hospitalization as well as those of cardiac catheterization reviewed with patient  Findings of echo showing mitral regurgitation also discussed  Patient will be treated with the medical therapy as well as considerations were repeat echocardiogram in a few months for mitral regurgitation  Symptoms water from cardiac standpoint discussed with patient  Medications reviewed  Prescriptions refilled  Patient had a few questions which were answered  Follow-up with primary care physician  Followup in 4 months or earlier as needed  Patient is agreeable with the plan of care  Time 25 min  50% of the time was spent in counseling and coordination of care

## 2018-04-29 NOTE — PROGRESS NOTES
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  who presents with chief complaint of Shoulder Pain  Patient complains of right shoulder pain  The symptoms began 3/4/2018  Aggravating factors:  Patient fell 3 times on 03/04/2018  One time down to metal stairs  He did not hit his head or lose consciousness  He went to the emergency room complaining of right shoulder pain on 03/05/2018  Symptoms are exacerbated by   Range of motion of the shoulder  Evaluation to date: plain films:   X-rays are reviewed today  They reveal no fracture  There is severe glenohumeral arthritis    Therapy to date includes:   Sling  Patient's x-rays in the emergency room were read as a proximal humeral head fracture  He was asked to follow up with Orthopaedics  He is following up today  Again I do not believe that his x-rays actually showed any fracture            ROS:   General: No fever, no chills,  positive weight loss, no weight gain  HEENT:  No loss of hearing, no nose bleeds, no sore throat  Eyes:  No eye pain, no red eyes, no visual disturbance  Respiratory:  No cough, no shortness of breath, no wheezing  Cardiovascular:  No chest pain, no palpitations, no edema  GI: No abdominal pain, no nausea, no vomiting  Endocrine: No frequent urination, no excessive thirst  Urinary:  No dysuria, no hematuria, no incontinence  Musculoskeletal: see HPI   Skin:  No rash, no wounds  Neurological:    Positive dizziness, no headache, no numbness  Psychiatric:  No difficulty concentrating, no depression, no suicide thoughts, no anxiety  Review of all other systems is negative    PMH:  Past Medical History:   Diagnosis Date    Arthritis     Coronary artery disease     Disease of thyroid gland     Heart disease     Hypertension        PSH:  Past Surgical History:   Procedure Laterality Date    CARDIAC SURGERY      HERNIA REPAIR         Medications:  Current Outpatient Prescriptions   Medication Sig Dispense Refill    aspirin (ECOTRIN LOW STRENGTH) 81 mg EC tablet Take 81 mg by mouth daily      atorvastatin (LIPITOR) 40 mg tablet Take 1 tablet (40 mg total) by mouth daily with dinner 90 tablet 3    cyanocobalamin 1000 MCG tablet Take 100 mcg by mouth daily      levothyroxine (SYNTHROID) 88 mcg tablet Take 1 tablet (88 mcg total) by mouth daily 90 tablet 3    metoprolol succinate (TOPROL-XL) 25 mg 24 hr tablet Take 1 tablet (25 mg total) by mouth daily 90 tablet 3    nitroglycerin (NITROSTAT) 0 4 mg SL tablet Place 0 4 mg under the tongue every 5 (five) minutes as needed for chest pain      ramipril (ALTACE) 5 mg capsule Take 1 capsule (5 mg total) by mouth daily 90 capsule 3     No current facility-administered medications for this visit  Allergies: Allergies   Allergen Reactions    Penicillins Swelling    Keflex [Cephalexin]     Prednisone Other (See Comments)     Pt states "i don't know, I break out"       Family History:  Family History   Problem Relation Age of Onset    No Known Problems Mother     No Known Problems Father        Social History:  Social History     Occupational History    Not on file  Social History Main Topics    Smoking status: Current Some Day Smoker     Types: Pipe    Smokeless tobacco: Never Used    Alcohol use Yes      Comment: socially    Drug use: No    Sexual activity: Not on file       Physical Exam:  General :  Alert, cooperative, no distress, appears stated age  Blood pressure 156/79, pulse 75, height 5' 6" (1 676 m), weight 71 7 kg (158 lb 2 oz)  Head:  Normocephalic, without obvious abnormality, atraumatic   Eyes:  Conjunctiva/corneas clear, EOM's intact,   Ears: Both ears normal appearance, no hearing deficits      Nose: Nares normal, septum midline, no drainage    Neck: Supple,  trachea midline, no adenopathy, no tenderness, no mass   Back:   Symmetric, no curvature, ROM normal, no tenderness   Lungs:   Respirations unlabored   Chest Wall:  No tenderness or deformity   Extremities: Extremities normal, atraumatic, no cyanosis or edema      Pulses: 2+ and symmetric   Skin: Skin color, texture, turgor normal, no rashes or lesions      Neurologic: Normal           Ortho Exam   left shoulder exam revealed no warmth or erythema or obvious effusion  There was some mild diffuse tenderness  No ecchymosis  Sensation intact to light touch  Motor exam revealed  Intact deltoid and in tact for flexion abduction in internal external rotation although patient was not cooperative with exam and refused a full range of motion evaluation  Imaging Studies: The following imaging studies were reviewed in office today  My findings are noted  we repeated the x-rays of the right shoulder today 03/13/2018  Again no fracture noted  Severe glenohumeral arthritis noted  Assessment  Encounter Diagnoses   Name Primary?  Right shoulder pain, unspecified chronicity Yes    Primary osteoarthritis of right shoulder     Contusion, shoulder and upper arm, multiple sites, right, initial encounter          Plan:    Patient refused physical therapy  He refuses tramadol  We have encouraged him to do is gentle range of motion home exercise  He is going to follow up on a p r n  Basis

## 2018-05-21 ENCOUNTER — TELEPHONE (OUTPATIENT)
Dept: CARDIOLOGY CLINIC | Facility: CLINIC | Age: 83
End: 2018-05-21

## 2018-05-21 NOTE — TELEPHONE ENCOUNTER
PT OF DR PROCTOR BUT WANTS TO SPEAK TO DR DEVINE ABOUT CATH  PT SAID HE WASN'T INFORMED ABOUT WHAT WAS DONE TO HIM DURING CATH  PLEASE HAVE DR DEVINE CALL PT AT THE END OF THE DAY TODAY   Eren Lynch

## 2018-10-03 ENCOUNTER — OFFICE VISIT (OUTPATIENT)
Dept: CARDIOLOGY CLINIC | Facility: CLINIC | Age: 83
End: 2018-10-03
Payer: MEDICARE

## 2018-10-03 VITALS
SYSTOLIC BLOOD PRESSURE: 150 MMHG | WEIGHT: 149.8 LBS | OXYGEN SATURATION: 96 % | BODY MASS INDEX: 24.08 KG/M2 | DIASTOLIC BLOOD PRESSURE: 70 MMHG | HEIGHT: 66 IN | HEART RATE: 65 BPM

## 2018-10-03 DIAGNOSIS — I25.10 CORONARY ARTERY DISEASE INVOLVING NATIVE CORONARY ARTERY OF NATIVE HEART WITHOUT ANGINA PECTORIS: Primary | Chronic | ICD-10-CM

## 2018-10-03 DIAGNOSIS — I05.9 MITRAL VALVE DISORDER: ICD-10-CM

## 2018-10-03 DIAGNOSIS — I10 ESSENTIAL HYPERTENSION: ICD-10-CM

## 2018-10-03 DIAGNOSIS — E78.2 HYPERLIPEMIA, MIXED: ICD-10-CM

## 2018-10-03 PROBLEM — R07.9 CHEST PAIN: Status: RESOLVED | Noted: 2018-04-15 | Resolved: 2018-10-03

## 2018-10-03 PROCEDURE — 99214 OFFICE O/P EST MOD 30 MIN: CPT | Performed by: INTERNAL MEDICINE

## 2018-10-03 NOTE — PROGRESS NOTES
[facility] [place]  [address] [place] [id number]  Cardiology Follow Up    Mercedes Garza  9/6/1930  [id number]      1  Coronary artery disease involving native coronary artery of native heart without angina pectoris     2  Essential hypertension     3  Mitral valve disorder     4  Hyperlipemia, mixed         Chief Complaint   Patient presents with    Follow-up       Interval History:   Patient presents for follow-up visit  Patient denies any chest pain  No shortness of breath out of the ordinary  No history of leg edema orthopnea PND  No history of presyncope syncope  Patient states that he has been compliant with all his present medications  Patient does have history of mitral regurgitation  Patient Active Problem List   Diagnosis    CAD (coronary artery disease)    Hypertension    Hypothyroid    Mitral valve disorder    Hyperlipemia, mixed     Past Medical History:   Diagnosis Date    Arthritis     Coronary artery disease     Disease of thyroid gland     Heart disease     Hypertension      Social History     Social History    Marital status: Single     Spouse name: N/A    Number of children: N/A    Years of education: N/A     Occupational History    Not on file       Social History Main Topics    Smoking status: Current Some Day Smoker     Types: Pipe    Smokeless tobacco: Never Used    Alcohol use Yes      Comment: socially    Drug use: No    Sexual activity: Not on file     Other Topics Concern    Not on file     Social History Narrative    No narrative on file      Family History   Problem Relation Age of Onset    No Known Problems Mother     No Known Problems Father      Past Surgical History:   Procedure Laterality Date    CARDIAC SURGERY      HERNIA REPAIR         Current Outpatient Prescriptions:     aspirin (ECOTRIN LOW STRENGTH) 81 mg EC tablet, Take 81 mg by mouth daily, Disp: , Rfl:     atorvastatin (LIPITOR) 40 mg tablet, Take 1 tablet (40 mg total) by mouth daily with dinner, Disp: 90 tablet, Rfl: 3    cyanocobalamin 1000 MCG tablet, Take 100 mcg by mouth daily, Disp: , Rfl:     levothyroxine (SYNTHROID) 88 mcg tablet, Take 1 tablet (88 mcg total) by mouth daily, Disp: 90 tablet, Rfl: 3    metoprolol succinate (TOPROL-XL) 25 mg 24 hr tablet, Take 1 tablet (25 mg total) by mouth daily, Disp: 90 tablet, Rfl: 3    nitroglycerin (NITROSTAT) 0 4 mg SL tablet, Place 0 4 mg under the tongue every 5 (five) minutes as needed for chest pain, Disp: , Rfl:     ramipril (ALTACE) 5 mg capsule, Take 1 capsule (5 mg total) by mouth daily, Disp: 90 capsule, Rfl: 3  Allergies   Allergen Reactions    Penicillins Swelling    Keflex [Cephalexin]     Prednisone Other (See Comments)     Pt states "i don't know, I break out"       Labs:  No visits with results within 2 Month(s) from this visit     Latest known visit with results is:   Admission on 04/15/2018, Discharged on 04/17/2018   Component Date Value    WBC 04/15/2018 6 18     RBC 04/15/2018 3 82*    Hemoglobin 04/15/2018 11 9*    Hematocrit 04/15/2018 37 3     MCV 04/15/2018 98     MCH 04/15/2018 31 2     MCHC 04/15/2018 31 9     RDW 04/15/2018 14 1     MPV 04/15/2018 9 3     Platelets 45/51/4755 249     nRBC 04/15/2018 0     Neutrophils Relative 04/15/2018 63     Lymphocytes Relative 04/15/2018 22     Monocytes Relative 04/15/2018 11     Eosinophils Relative 04/15/2018 3     Basophils Relative 04/15/2018 1     Neutrophils Absolute 04/15/2018 3 87     Lymphocytes Absolute 04/15/2018 1 38     Monocytes Absolute 04/15/2018 0 69     Eosinophils Absolute 04/15/2018 0 19     Basophils Absolute 04/15/2018 0 04     Sodium 04/15/2018 140     Potassium 04/15/2018 4 1     Chloride 04/15/2018 106     CO2 04/15/2018 26     ANION GAP 04/15/2018 8     BUN 04/15/2018 20     Creatinine 04/15/2018 1 30     Glucose 04/15/2018 111     Calcium 04/15/2018 8 5     AST 04/15/2018 15     ALT 04/15/2018 15     Alkaline Phosphatase 04/15/2018 85     Total Protein 04/15/2018 6 3*    Albumin 04/15/2018 3 2*    Total Bilirubin 04/15/2018 0 30     eGFR 04/15/2018 49     Lipase 04/15/2018 143     Troponin I 04/15/2018 0 17*    Troponin I 04/15/2018 0 26*    PTT 04/15/2018 171*    WBC 04/15/2018 5 69     RBC 04/15/2018 3 63*    Hemoglobin 04/15/2018 11 3*    Hematocrit 04/15/2018 35 0*    MCV 04/15/2018 96     MCH 04/15/2018 31 1     MCHC 04/15/2018 32 3     RDW 04/15/2018 14 0     Platelets 34/42/4569 224     MPV 04/15/2018 9 4     Protime 04/15/2018 14 7*    INR 04/15/2018 1 12     Cholesterol 04/15/2018 149     Triglycerides 04/15/2018 70     HDL, Direct 04/15/2018 44     LDL Calculated 04/15/2018 91     Non-HDL-Chol (CHOL-HDL) 04/15/2018 105     Troponin I 04/15/2018 0 31*    Ventricular Rate 04/15/2018 71     Atrial Rate 04/15/2018 71     OR Interval 04/15/2018 218     QRSD Interval 04/15/2018 88     QT Interval 04/15/2018 380     QTC Interval 04/15/2018 412     P Axis 04/15/2018 57     QRS Axis 04/15/2018 -24     T Wave [place] 04/15/2018 107     Ventricular Rate 04/15/2018 62     Atrial Rate 04/15/2018 62     OR Interval 04/15/2018 232     QRSD Interval 04/15/2018 92     QT Interval 04/15/2018 432     QTC Interval 04/15/2018 438     P Axis 04/15/2018 78     QRS Axis 04/15/2018 -26     T Wave Axis 04/15/2018 78     PTT 04/15/2018 51*    PTT 04/16/2018 84*    Sodium 04/16/2018 139     Potassium 04/16/2018 4 2     Chloride 04/16/2018 106     CO2 04/16/2018 26     ANION GAP 04/16/2018 7     BUN 04/16/2018 26*    Creatinine 04/16/2018 1 17     Glucose 04/16/2018 91     Calcium 04/16/2018 8 6     eGFR 04/16/2018 56     WBC 04/16/2018 6 85     RBC 04/16/2018 3 57*    Hemoglobin 04/16/2018 11 0*    Hematocrit 04/16/2018 34 2*    MCV 04/16/2018 96     MCH 04/16/2018 30 8     MCHC 04/16/2018 32 2     RDW 04/16/2018 14 0     Platelets 91/87/5202 203  MPV 04/16/2018 9 0     TSH 3RD GENERATON 04/16/2018 5 808*    Protime 04/16/2018 14 2     INR 04/16/2018 1 07      Imaging: No results found  Review of Systems:  Review of Systems     REVIEW OF SYSTEMS:  Constitutional:  Denies fever or chills   Eyes:  Denies change in visual acuity   HENT:  Denies nasal congestion or sore throat   Respiratory:  Denies cough or shortness of breath   Cardiovascular:  Denies chest pain or edema   GI:  Denies abdominal pain, nausea, vomiting, bloody stools or diarrhea   :  Denies dysuria, frequency, difficulty in micturition and nocturia  Musculoskeletal:  Denies back pain or joint pain   Neurologic:  Denies headache, focal weakness or sensory changes   Endocrine:  Denies polyuria or polydipsia   Lymphatic:  Denies swollen glands   Psychiatric:  Denies depression or anxiety     Physical Exam:    /70   Pulse 65   Ht 5' 6" (1 676 m)   Wt 67 9 kg (149 lb 12 8 oz)   SpO2 96%   BMI 24 18 kg/m²     Physical Exam   PHYSICAL EXAM:  General:  Patient is not in acute distress   Head: Normocephalic, Atraumatic  HEENT:  Both pupils normal-size atraumatic, normocephalic, nonicteric  Neck:  JVP not raised  Trachea central  No carotid bruit  Respiratory:  normal breath sounds no crackles  no rhonchi  Cardiovascular:  Regular rate and rhythm no S3   Short systolic murmur in the mitral area  GI:  Abdomen soft nontender  No organomegaly  Lymphatic:  No cervical or inguinal lymphadenopathy  Neurologic:  Patient is awake alert, oriented   Grossly nonfocal    Discussion/Summary: Patient with multiple medical problems who seems to be doing reasonably well from cardiac standpoint  Previous studies reviewed with patient  Medications reviewed and possible side effects discussed  concepts of cardiovascular disease , signs and symptoms of heart disease  Dietary and risk factor modification reinforced  All questions answered  Safety measures reviewed   Patient advised to report any problems prompting medical attention  Medications reviewed  Importance of salt restriction reinforced with the patient  Symptoms to watch out from cardiac standpoint also discussed with the patient  Repeat echocardiogram prior to next visit to reassess ejection fraction and assess the status of mitral regurgitation  Patient had a few questions which were answered  Patient will continue to follow up with primary care physician  Follow up in 6 months

## 2019-01-14 ENCOUNTER — TELEPHONE (OUTPATIENT)
Dept: UROLOGY | Facility: MEDICAL CENTER | Age: 84
End: 2019-01-14

## 2019-01-14 NOTE — TELEPHONE ENCOUNTER
Patient has appointment 03/18 at 1:15 pm in [facility] office  Dorette Border medicare advantage member id [id number] phone number to [facility] [phone]

## 2019-01-14 NOTE — TELEPHONE ENCOUNTER
New Patient Intake form:    Complaint/Diagnosis:      Insurance:Medicare A and B and [employer] supplement    History of Cancer: Prostate Cancer 2007    Previous urologist: Dr Chaim Solares [facility]) [facility]    Outside Testing/where:[facility]    If yes, what kind:labs     Records Requested/Where:    Preferred location:[place]

## 2019-03-13 NOTE — TELEPHONE ENCOUNTER
Called patient for information on his previous urologist medical records  Patient answered did not know why I was calling  Explained to patient he was new patient and he has appointment  Patient believes maybe his son might of made appointment  Called number on intake and son's number left message for a call back to confirm if patient needs appointment  Patient gave his son's phone number of [phone]

## 2019-03-15 ENCOUNTER — TELEPHONE (OUTPATIENT)
Dept: UROLOGY | Facility: CLINIC | Age: 84
End: 2019-03-15

## 2019-03-18 ENCOUNTER — OFFICE VISIT (OUTPATIENT)
Dept: UROLOGY | Facility: CLINIC | Age: 84
End: 2019-03-18
Payer: MEDICARE

## 2019-03-18 VITALS
HEIGHT: 66 IN | BODY MASS INDEX: 25.07 KG/M2 | WEIGHT: 156 LBS | DIASTOLIC BLOOD PRESSURE: 74 MMHG | SYSTOLIC BLOOD PRESSURE: 120 MMHG | HEART RATE: 62 BPM

## 2019-03-18 DIAGNOSIS — Z85.46 HISTORY OF PROSTATE CANCER: Primary | ICD-10-CM

## 2019-03-18 PROCEDURE — 99203 OFFICE O/P NEW LOW 30 MIN: CPT | Performed by: UROLOGY

## 2019-03-18 NOTE — PROGRESS NOTES
Referring Physician: Luz Enriquez PA-C  A copy of this note was sent to the referring physician  Diagnoses and all orders for this visit:    History of prostate cancer            Assessment and plan:       Patient apparently has been referred for history of prostate cancer     On careful questioning of the patient as well as his son who I contacted and brought into the examination room he has no personal history of prostate cancer  He has never undergone any treatment for BPH, prostate issues of any kind  Moreover he is asymptomatic from a voiding standpoint  He denies any hematuria or prior UTIs  He will follow-up as needed    Eryn Castellanos MD      Chief Complaint     History of prostate cancer      History of Present Illness     Diandra Escobedo is a 80 y o  male referred in consultation by Dr Angie Malone for history of prostate cancer  Patient denies any prior history of prostate cancer  He denies any dysuria, hematuria, prior urinary tract infections, nocturia, or urinary complaints of any kind  He states he is unclear why is been referred to urologist   I contacted his son who confirms the above    We have attempted to request records including recent lab work for the patient but none have been furnished thus far    Detailed Urologic History     - please refer to HPI    Review of Systems     Review of Systems   Constitutional: Negative for activity change and fatigue  HENT: Negative for congestion  Eyes: Negative for visual disturbance  Respiratory: Negative for shortness of breath and wheezing  Cardiovascular: Negative for chest pain and leg swelling  Gastrointestinal: Negative for abdominal pain  Genitourinary: Negative for flank pain, hematuria and urgency  Musculoskeletal: Negative for back pain  Allergic/Immunologic: Negative for immunocompromised state  Neurological: Negative for dizziness and numbness  Psychiatric/Behavioral: Negative for dysphoric mood     All other systems reviewed and are negative  Allergies     Allergies   Allergen Reactions    Penicillins Swelling    Keflex [Cephalexin]     Prednisone Other (See Comments)     Pt states "i don't know, I break out"       Physical Exam     Physical Exam   Constitutional: He is oriented to person, place, and time  He appears well-developed and well-nourished  No distress  HENT:   Head: Normocephalic and atraumatic  Eyes: EOM are normal    Neck: Normal range of motion  Cardiovascular:   Negative lower extremity edema   Pulmonary/Chest: Effort normal and breath sounds normal    Abdominal: Soft  Genitourinary: Penis normal    Genitourinary Comments: Open right inguinal hernia incision   Musculoskeletal: Normal range of motion  Neurological: He is alert and oriented to person, place, and time  Skin: Skin is warm  Psychiatric: He has a normal mood and affect   His behavior is normal            Vital Signs  Vitals:    03/18/19 1316   BP: 120/74   BP Location: Left arm   Patient Position: Sitting   Cuff Size: Adult   Pulse: 62   Weight: 70 8 kg (156 lb)   Height: 5' 6" (1 676 m)         Current Medications       Current Outpatient Medications:     aspirin (ECOTRIN LOW STRENGTH) 81 mg EC tablet, Take 81 mg by mouth daily, Disp: , Rfl:     atorvastatin (LIPITOR) 40 mg tablet, Take 1 tablet (40 mg total) by mouth daily with dinner, Disp: 90 tablet, Rfl: 3    cyanocobalamin 1000 MCG tablet, Take 100 mcg by mouth daily, Disp: , Rfl:     levothyroxine (SYNTHROID) 88 mcg tablet, Take 1 tablet (88 mcg total) by mouth daily, Disp: 90 tablet, Rfl: 3    metoprolol succinate (TOPROL-XL) 25 mg 24 hr tablet, Take 1 tablet (25 mg total) by mouth daily, Disp: 90 tablet, Rfl: 3    nitroglycerin (NITROSTAT) 0 4 mg SL tablet, Place 0 4 mg under the tongue every 5 (five) minutes as needed for chest pain, Disp: , Rfl:     ramipril (ALTACE) 5 mg capsule, Take 1 capsule (5 mg total) by mouth daily, Disp: 90 capsule, Rfl: 3      Active Problems     Patient Active Problem List   Diagnosis    CAD (coronary artery disease)    Hypertension    Hypothyroid    Mitral valve disorder    Hyperlipemia, mixed    History of prostate cancer         Past Medical History     Past Medical History:   Diagnosis Date    Arthritis     Coronary artery disease     Disease of thyroid gland     Heart disease     Hypertension          Surgical History     Past Surgical History:   Procedure Laterality Date    CARDIAC SURGERY      HERNIA REPAIR           Family History     Family History   Problem Relation Age of Onset    No Known Problems Mother     No Known Problems Father          Social History     Social History     Social History     Tobacco Use   Smoking Status Current Some Day Smoker    Types: Pipe   Smokeless Tobacco Never Used         Pertinent Lab Values     Lab Results   Component Value Date    CREATININE 1 17 04/16/2018       No results found for: PSA  No records are available      Pertinent Imaging      - n/a     Portions of the record may have been created with voice recognition software   Occasional wrong word or "sound a like" substitutions may have occurred due to the inherent limitations of voice recognition software   Read the chart carefully and recognize, using context, where substitutions have occurred

## 2020-06-01 ENCOUNTER — OFFICE VISIT (OUTPATIENT)
Dept: GASTROENTEROLOGY | Facility: CLINIC | Age: 85
End: 2020-06-01
Payer: MEDICARE

## 2020-06-01 VITALS
WEIGHT: 148 LBS | HEART RATE: 70 BPM | HEIGHT: 65 IN | TEMPERATURE: 97.4 F | SYSTOLIC BLOOD PRESSURE: 120 MMHG | DIASTOLIC BLOOD PRESSURE: 68 MMHG | BODY MASS INDEX: 24.66 KG/M2

## 2020-06-01 DIAGNOSIS — Z12.11 COLON CANCER SCREENING: ICD-10-CM

## 2020-06-01 DIAGNOSIS — R97.20 ELEVATED PSA: Primary | ICD-10-CM

## 2020-06-01 PROCEDURE — 99213 OFFICE O/P EST LOW 20 MIN: CPT | Performed by: PHYSICIAN ASSISTANT

## 2020-06-01 RX ORDER — TAMSULOSIN HYDROCHLORIDE 0.4 MG/1
CAPSULE ORAL
COMMUNITY
Start: 2020-02-24

## 2021-03-16 ENCOUNTER — IMMUNIZATIONS (OUTPATIENT)
Dept: FAMILY MEDICINE CLINIC | Facility: HOSPITAL | Age: 86
End: 2021-03-16

## 2021-03-16 DIAGNOSIS — Z23 ENCOUNTER FOR IMMUNIZATION: Primary | ICD-10-CM

## 2021-03-16 PROCEDURE — 91300 SARS-COV-2 / COVID-19 MRNA VACCINE (PFIZER-BIONTECH) 30 MCG: CPT

## 2021-03-16 PROCEDURE — 0001A SARS-COV-2 / COVID-19 MRNA VACCINE (PFIZER-BIONTECH) 30 MCG: CPT

## 2021-04-07 ENCOUNTER — IMMUNIZATIONS (OUTPATIENT)
Dept: FAMILY MEDICINE CLINIC | Facility: HOSPITAL | Age: 86
End: 2021-04-07

## 2021-04-07 DIAGNOSIS — Z23 ENCOUNTER FOR IMMUNIZATION: Primary | ICD-10-CM

## 2021-04-07 PROCEDURE — 0002A SARS-COV-2 / COVID-19 MRNA VACCINE (PFIZER-BIONTECH) 30 MCG: CPT

## 2021-04-07 PROCEDURE — 91300 SARS-COV-2 / COVID-19 MRNA VACCINE (PFIZER-BIONTECH) 30 MCG: CPT

## 2022-08-12 ENCOUNTER — TELEPHONE (OUTPATIENT)
Dept: CARDIOLOGY CLINIC | Facility: CLINIC | Age: 87
End: 2022-08-12

## 2022-08-12 NOTE — TELEPHONE ENCOUNTER
Pt's son Venkat Johnson called & wanted to make an appointment with Dr Mayuri Fischer for his dad  Pt was last seen by Dr Mayuri Fischer 10/3/2018  Venkat Johnson would like to know if Dr Mayuri Fischer will take pt on       Please Advise       Venkat Johnson (pt's son) [phone]

## 2022-08-13 NOTE — TELEPHONE ENCOUNTER
If non Urgent and a routine appointment to reestablish, I can see this patient in September  Otherwise earliest available provider

## 2023-08-16 ENCOUNTER — HOSPITAL ENCOUNTER (INPATIENT)
Facility: HOSPITAL | Age: 88
LOS: 7 days | Discharge: NURSING, REHAB OR LONG-TERM CARE | DRG: 683 | End: 2023-08-23
Attending: EMERGENCY MEDICINE | Admitting: INTERNAL MEDICINE
Payer: MEDICARE

## 2023-08-16 ENCOUNTER — APPOINTMENT (EMERGENCY)
Dept: CT IMAGING | Facility: HOSPITAL | Age: 88
DRG: 683 | End: 2023-08-16
Payer: MEDICARE

## 2023-08-16 DIAGNOSIS — R41.3 MEMORY LOSS: ICD-10-CM

## 2023-08-16 DIAGNOSIS — R26.2 AMBULATORY DYSFUNCTION: ICD-10-CM

## 2023-08-16 DIAGNOSIS — B02.8 HERPES ZOSTER WITH COMPLICATION: ICD-10-CM

## 2023-08-16 DIAGNOSIS — M54.50 ACUTE LOW BACK PAIN: Primary | ICD-10-CM

## 2023-08-16 PROBLEM — N17.9 AKI (ACUTE KIDNEY INJURY) (HCC): Status: ACTIVE | Noted: 2023-08-16

## 2023-08-16 PROBLEM — I25.10 CORONARY ARTERY DISEASE INVOLVING NATIVE CORONARY ARTERY OF NATIVE HEART WITHOUT ANGINA PECTORIS: Status: ACTIVE | Noted: 2018-04-15

## 2023-08-16 LAB
ALBUMIN SERPL BCP-MCNC: 4.2 G/DL (ref 3.5–5)
ALP SERPL-CCNC: 65 U/L (ref 34–104)
ALT SERPL W P-5'-P-CCNC: 6 U/L (ref 7–52)
ANION GAP SERPL CALCULATED.3IONS-SCNC: 8 MMOL/L
AST SERPL W P-5'-P-CCNC: 14 U/L (ref 13–39)
BACTERIA UR QL AUTO: ABNORMAL /HPF
BASOPHILS # BLD AUTO: 0.03 THOUSANDS/ÂΜL (ref 0–0.1)
BASOPHILS NFR BLD AUTO: 1 % (ref 0–1)
BILIRUB SERPL-MCNC: 0.64 MG/DL (ref 0.2–1)
BILIRUB UR QL STRIP: NEGATIVE
BUN SERPL-MCNC: 31 MG/DL (ref 5–25)
CALCIUM SERPL-MCNC: 9.6 MG/DL (ref 8.4–10.2)
CHLORIDE SERPL-SCNC: 107 MMOL/L (ref 96–108)
CLARITY UR: CLEAR
CO2 SERPL-SCNC: 23 MMOL/L (ref 21–32)
COLOR UR: ABNORMAL
CREAT SERPL-MCNC: 1.78 MG/DL (ref 0.6–1.3)
EOSINOPHIL # BLD AUTO: 0.1 THOUSAND/ÂΜL (ref 0–0.61)
EOSINOPHIL NFR BLD AUTO: 2 % (ref 0–6)
ERYTHROCYTE [DISTWIDTH] IN BLOOD BY AUTOMATED COUNT: 13.7 % (ref 11.6–15.1)
GFR SERPL CREATININE-BSD FRML MDRD: 32 ML/MIN/1.73SQ M
GLUCOSE SERPL-MCNC: 92 MG/DL (ref 65–140)
GLUCOSE UR STRIP-MCNC: NEGATIVE MG/DL
HCT VFR BLD AUTO: 38.4 % (ref 36.5–49.3)
HGB BLD-MCNC: 12.2 G/DL (ref 12–17)
HGB UR QL STRIP.AUTO: ABNORMAL
HYALINE CASTS #/AREA URNS LPF: ABNORMAL /LPF
IMM GRANULOCYTES # BLD AUTO: 0.02 THOUSAND/UL (ref 0–0.2)
IMM GRANULOCYTES NFR BLD AUTO: 0 % (ref 0–2)
KETONES UR STRIP-MCNC: NEGATIVE MG/DL
LEUKOCYTE ESTERASE UR QL STRIP: NEGATIVE
LYMPHOCYTES # BLD AUTO: 0.77 THOUSANDS/ÂΜL (ref 0.6–4.47)
LYMPHOCYTES NFR BLD AUTO: 13 % (ref 14–44)
MAGNESIUM SERPL-MCNC: 2.3 MG/DL (ref 1.9–2.7)
MCH RBC QN AUTO: 31.3 PG (ref 26.8–34.3)
MCHC RBC AUTO-ENTMCNC: 31.8 G/DL (ref 31.4–37.4)
MCV RBC AUTO: 99 FL (ref 82–98)
MONOCYTES # BLD AUTO: 0.89 THOUSAND/ÂΜL (ref 0.17–1.22)
MONOCYTES NFR BLD AUTO: 14 % (ref 4–12)
MUCOUS THREADS UR QL AUTO: ABNORMAL
NEUTROPHILS # BLD AUTO: 4.37 THOUSANDS/ÂΜL (ref 1.85–7.62)
NEUTS SEG NFR BLD AUTO: 70 % (ref 43–75)
NITRITE UR QL STRIP: NEGATIVE
NON-SQ EPI CELLS URNS QL MICRO: ABNORMAL /HPF
NRBC BLD AUTO-RTO: 0 /100 WBCS
PH UR STRIP.AUTO: 5.5 [PH]
PLATELET # BLD AUTO: 175 THOUSANDS/UL (ref 149–390)
PMV BLD AUTO: 9.3 FL (ref 8.9–12.7)
POTASSIUM SERPL-SCNC: 4.4 MMOL/L (ref 3.5–5.3)
PROT SERPL-MCNC: 6.5 G/DL (ref 6.4–8.4)
PROT UR STRIP-MCNC: ABNORMAL MG/DL
RBC # BLD AUTO: 3.9 MILLION/UL (ref 3.88–5.62)
RBC #/AREA URNS AUTO: ABNORMAL /HPF
SODIUM SERPL-SCNC: 138 MMOL/L (ref 135–147)
SP GR UR STRIP.AUTO: 1.01 (ref 1–1.03)
UROBILINOGEN UR STRIP-ACNC: <2 MG/DL
WBC # BLD AUTO: 6.18 THOUSAND/UL (ref 4.31–10.16)
WBC #/AREA URNS AUTO: ABNORMAL /HPF

## 2023-08-16 PROCEDURE — 80053 COMPREHEN METABOLIC PANEL: CPT | Performed by: INTERNAL MEDICINE

## 2023-08-16 PROCEDURE — 99285 EMERGENCY DEPT VISIT HI MDM: CPT

## 2023-08-16 PROCEDURE — NC001 PR NO CHARGE

## 2023-08-16 PROCEDURE — 97167 OT EVAL HIGH COMPLEX 60 MIN: CPT

## 2023-08-16 PROCEDURE — 36415 COLL VENOUS BLD VENIPUNCTURE: CPT | Performed by: INTERNAL MEDICINE

## 2023-08-16 PROCEDURE — 99223 1ST HOSP IP/OBS HIGH 75: CPT | Performed by: INTERNAL MEDICINE

## 2023-08-16 PROCEDURE — 83735 ASSAY OF MAGNESIUM: CPT | Performed by: INTERNAL MEDICINE

## 2023-08-16 PROCEDURE — 81001 URINALYSIS AUTO W/SCOPE: CPT | Performed by: INTERNAL MEDICINE

## 2023-08-16 PROCEDURE — 97163 PT EVAL HIGH COMPLEX 45 MIN: CPT

## 2023-08-16 PROCEDURE — 85025 COMPLETE CBC W/AUTO DIFF WBC: CPT | Performed by: INTERNAL MEDICINE

## 2023-08-16 PROCEDURE — 72131 CT LUMBAR SPINE W/O DYE: CPT

## 2023-08-16 RX ORDER — TAMSULOSIN HYDROCHLORIDE 0.4 MG/1
0.4 CAPSULE ORAL
Status: DISCONTINUED | OUTPATIENT
Start: 2023-08-16 | End: 2023-08-23 | Stop reason: HOSPADM

## 2023-08-16 RX ORDER — HYDRALAZINE HYDROCHLORIDE 20 MG/ML
10 INJECTION INTRAMUSCULAR; INTRAVENOUS EVERY 6 HOURS PRN
Status: DISCONTINUED | OUTPATIENT
Start: 2023-08-16 | End: 2023-08-19

## 2023-08-16 RX ORDER — ONDANSETRON 2 MG/ML
4 INJECTION INTRAMUSCULAR; INTRAVENOUS EVERY 6 HOURS PRN
Status: DISCONTINUED | OUTPATIENT
Start: 2023-08-16 | End: 2023-08-23 | Stop reason: HOSPADM

## 2023-08-16 RX ORDER — METOPROLOL SUCCINATE 25 MG/1
25 TABLET, EXTENDED RELEASE ORAL DAILY
Status: DISCONTINUED | OUTPATIENT
Start: 2023-08-16 | End: 2023-08-23 | Stop reason: HOSPADM

## 2023-08-16 RX ORDER — OXYCODONE HYDROCHLORIDE 5 MG/1
5 TABLET ORAL EVERY 4 HOURS PRN
Status: DISCONTINUED | OUTPATIENT
Start: 2023-08-16 | End: 2023-08-23 | Stop reason: HOSPADM

## 2023-08-16 RX ORDER — ATORVASTATIN CALCIUM 40 MG/1
40 TABLET, FILM COATED ORAL
Status: DISCONTINUED | OUTPATIENT
Start: 2023-08-16 | End: 2023-08-23 | Stop reason: HOSPADM

## 2023-08-16 RX ORDER — NITROGLYCERIN 0.4 MG/1
0.4 TABLET SUBLINGUAL
Status: DISCONTINUED | OUTPATIENT
Start: 2023-08-16 | End: 2023-08-23 | Stop reason: HOSPADM

## 2023-08-16 RX ORDER — AMOXICILLIN 250 MG
1 CAPSULE ORAL
Status: DISCONTINUED | OUTPATIENT
Start: 2023-08-16 | End: 2023-08-23 | Stop reason: HOSPADM

## 2023-08-16 RX ORDER — ENOXAPARIN SODIUM 100 MG/ML
40 INJECTION SUBCUTANEOUS DAILY
Status: DISCONTINUED | OUTPATIENT
Start: 2023-08-16 | End: 2023-08-16

## 2023-08-16 RX ORDER — LIDOCAINE 50 MG/G
1 PATCH TOPICAL DAILY
Status: DISCONTINUED | OUTPATIENT
Start: 2023-08-17 | End: 2023-08-23 | Stop reason: HOSPADM

## 2023-08-16 RX ORDER — HEPARIN SODIUM 5000 [USP'U]/ML
5000 INJECTION, SOLUTION INTRAVENOUS; SUBCUTANEOUS EVERY 8 HOURS SCHEDULED
Status: DISCONTINUED | OUTPATIENT
Start: 2023-08-17 | End: 2023-08-23 | Stop reason: HOSPADM

## 2023-08-16 RX ORDER — ACETAMINOPHEN 325 MG/1
650 TABLET ORAL EVERY 6 HOURS PRN
Status: DISCONTINUED | OUTPATIENT
Start: 2023-08-16 | End: 2023-08-23 | Stop reason: HOSPADM

## 2023-08-16 RX ORDER — NICOTINE 21 MG/24HR
1 PATCH, TRANSDERMAL 24 HOURS TRANSDERMAL DAILY
Status: DISCONTINUED | OUTPATIENT
Start: 2023-08-16 | End: 2023-08-23 | Stop reason: HOSPADM

## 2023-08-16 RX ORDER — LEVOTHYROXINE SODIUM 88 UG/1
88 TABLET ORAL DAILY
Status: DISCONTINUED | OUTPATIENT
Start: 2023-08-16 | End: 2023-08-23 | Stop reason: HOSPADM

## 2023-08-16 RX ORDER — LISINOPRIL 20 MG/1
20 TABLET ORAL DAILY
Status: DISCONTINUED | OUTPATIENT
Start: 2023-08-16 | End: 2023-08-16

## 2023-08-16 RX ORDER — LANOLIN ALCOHOL/MO/W.PET/CERES
3 CREAM (GRAM) TOPICAL
Status: DISCONTINUED | OUTPATIENT
Start: 2023-08-16 | End: 2023-08-23 | Stop reason: HOSPADM

## 2023-08-16 RX ORDER — SODIUM CHLORIDE, SODIUM GLUCONATE, SODIUM ACETATE, POTASSIUM CHLORIDE, MAGNESIUM CHLORIDE, SODIUM PHOSPHATE, DIBASIC, AND POTASSIUM PHOSPHATE .53; .5; .37; .037; .03; .012; .00082 G/100ML; G/100ML; G/100ML; G/100ML; G/100ML; G/100ML; G/100ML
100 INJECTION, SOLUTION INTRAVENOUS CONTINUOUS
Status: DISCONTINUED | OUTPATIENT
Start: 2023-08-16 | End: 2023-08-17

## 2023-08-16 RX ORDER — HYDROMORPHONE HCL IN WATER/PF 6 MG/30 ML
0.2 PATIENT CONTROLLED ANALGESIA SYRINGE INTRAVENOUS EVERY 4 HOURS PRN
Status: DISCONTINUED | OUTPATIENT
Start: 2023-08-16 | End: 2023-08-23 | Stop reason: HOSPADM

## 2023-08-16 RX ADMIN — SODIUM CHLORIDE, SODIUM GLUCONATE, SODIUM ACETATE, POTASSIUM CHLORIDE, MAGNESIUM CHLORIDE, SODIUM PHOSPHATE, DIBASIC, AND POTASSIUM PHOSPHATE 100 ML/HR: .53; .5; .37; .037; .03; .012; .00082 INJECTION, SOLUTION INTRAVENOUS at 16:49

## 2023-08-16 RX ADMIN — METOPROLOL SUCCINATE 25 MG: 25 TABLET, EXTENDED RELEASE ORAL at 16:49

## 2023-08-16 RX ADMIN — ATORVASTATIN CALCIUM 40 MG: 40 TABLET, FILM COATED ORAL at 16:50

## 2023-08-16 RX ADMIN — OXYCODONE HYDROCHLORIDE 5 MG: 5 TABLET ORAL at 16:50

## 2023-08-16 RX ADMIN — TAMSULOSIN HYDROCHLORIDE 0.4 MG: 0.4 CAPSULE ORAL at 16:48

## 2023-08-16 RX ADMIN — ASPIRIN 81 MG: 81 TABLET, COATED ORAL at 16:50

## 2023-08-16 RX ADMIN — LEVOTHYROXINE SODIUM 88 MCG: 88 TABLET ORAL at 16:49

## 2023-08-16 RX ADMIN — VITAM B12 100 MCG: 100 TAB at 16:49

## 2023-08-16 RX ADMIN — HYDRALAZINE HYDROCHLORIDE 10 MG: 20 INJECTION INTRAMUSCULAR; INTRAVENOUS at 16:54

## 2023-08-16 RX ADMIN — OXYCODONE HYDROCHLORIDE 5 MG: 5 TABLET ORAL at 22:46

## 2023-08-16 RX ADMIN — Medication 3 MG: at 22:58

## 2023-08-16 NOTE — PLAN OF CARE
Problem: PHYSICAL THERAPY ADULT  Goal: Performs mobility at highest level of function for planned discharge setting. See evaluation for individualized goals. Description: Treatment/Interventions: Functional transfer training, LE strengthening/ROM, Therapeutic exercise, Endurance training, Patient/family training, Equipment eval/education, Bed mobility, Gait training, Elevations, Spoke to nursing          See flowsheet documentation for full assessment, interventions and recommendations. Note: Prognosis: Good  Problem List: Decreased strength, Decreased endurance, Impaired balance, Decreased mobility, Decreased safety awareness, Pain  Assessment: Pt is 80 y.o. male seen for PT evaluation on 8/16/2023 s/p admit to [address] [facility] on 8/16/2023 w/ back pain. PT was consulted to assess pt's functional mobility and d/c needs. Order placed for PT eval and tx. PTA, pt resides alone in mobile home with 5 SAADIA, ambulates with SPC prn; + fall history. At time of eval, pt performing transfers SBA, household distance gait trial min A, requiring max A d/t uncorrected LOB. Upon evaluation, pt presenting with impaired functional mobility d/t decreased strength, decreased endurance, impaired balance, decreased mobility, decreased safety awareness, pain and activity intolerance. Pertinent PMHx and current co-morbidities affecting pt's physical performance at time of assessment include: CAD, HTN, hypothyroid, mitral valve disorder, HLD, h/o prostate CA. Personal factors affecting pt at time of eval include: ambulating w/ assistive device, stairs to enter home, inability to navigate community distances, inability to navigate level surfaces w/o external assistance, limited home support and positive fall history. The following objective measures performed on IE also reveal limitations: Barthel Index: 45/100, Modified [place]: 4 (moderate/severe disability) and AM-PAC 6-Clicks: 54/24.  Pt's clinical presentation is currently unstable/unpredictable seen in pt's presentation of advanced age, abnormal lab value(s), need for input for task focus and mobility technique and ongoing medical assessment. Overall, pt's rehab potential and prognosis to return to PLOF is good as impacted by objective findings, warranting pt to receive further skilled PT interventions to address identified impairments, activity limitation(s), and participation restriction(s). Pt to benefit from continued PT tx to address deficits as defined above and maximize level of functional independent mobility. From PT/mobility standpoint, recommendation at time of d/c would be post acute rehabilitation services pending progress in order to facilitate return to PLOF. Barriers to Discharge: Inaccessible home environment, Decreased caregiver support     PT Discharge Recommendation: Post acute rehabilitation services    See flowsheet documentation for full assessment.

## 2023-08-16 NOTE — ED PROVIDER NOTES
History  Chief Complaint   Patient presents with   • Back Pain     Pt reports lower back pain for the past 5 days. Denies injury he can recall. Diff ambulating because of it. Patient is a 22-year-old male with a past medical history of arthritis, coronary disease, thyroid disease, hypertension presenting to the emergency department for evaluation of back pain. Patient reports for the past 5 days he has had right-sided lower back pain. Patient reports he was able to walk around [employer] yesterday without difficulty using the cart to walk. Patient reports he has been walking less secondary to the pain. Patient reports he has not been taking his medications for the past 5 days secondary to "laziness". Denies bladder or bowel habit changes, bladder or bowel incontinence, or saddle anesthesia. Denies fevers, chills, rash, headache, weakness, dizziness, visual changes, abdominal pain, nausea, vomiting, diarrhea, constipation, chest pain, shortness of breath or difficulty breathing. Does not offer any other concerns or complaints. Prior to Admission Medications   Prescriptions Last Dose Informant Patient Reported?  Taking?   aspirin (ECOTRIN LOW STRENGTH) 81 mg EC tablet  Self Yes No   Sig: Take 81 mg by mouth daily   atorvastatin (LIPITOR) 40 mg tablet  Self No No   Sig: Take 1 tablet (40 mg total) by mouth daily with dinner   cyanocobalamin 1000 MCG tablet  Self Yes No   Sig: Take 100 mcg by mouth daily   levothyroxine (SYNTHROID) 88 mcg tablet  Self No No   Sig: Take 1 tablet (88 mcg total) by mouth daily   metoprolol succinate (TOPROL-XL) 25 mg 24 hr tablet  Self No No   Sig: Take 1 tablet (25 mg total) by mouth daily   nitroglycerin (NITROSTAT) 0.4 mg SL tablet  Self Yes No   Sig: Place 0.4 mg under the tongue every 5 (five) minutes as needed for chest pain   ramipril (ALTACE) 5 mg capsule  Self No No   Sig: Take 1 capsule (5 mg total) by mouth daily   tamsulosin (FLOMAX) 0.4 mg  Self Yes No Facility-Administered Medications: None       Past Medical History:   Diagnosis Date   • Arthritis    • Coronary artery disease    • Disease of thyroid gland    • Heart disease    • Hypertension        Past Surgical History:   Procedure Laterality Date   • CARDIAC SURGERY     • HERNIA REPAIR         Family History   Problem Relation Age of Onset   • No Known Problems Mother    • No Known Problems Father      I have reviewed and agree with the history as documented. E-Cigarette/Vaping   • E-Cigarette Use Never User      E-Cigarette/Vaping Substances     Social History     Tobacco Use   • Smoking status: Some Days     Types: Pipe   • Smokeless tobacco: Never   Vaping Use   • Vaping Use: Never used   Substance Use Topics   • Alcohol use: Yes     Comment: socially   • Drug use: No       Review of Systems   Constitutional: Negative for chills and fever. HENT: Negative for ear pain and sore throat. Eyes: Negative for pain and visual disturbance. Respiratory: Negative for cough and shortness of breath. Cardiovascular: Negative for chest pain and palpitations. Gastrointestinal: Negative for abdominal pain, constipation, diarrhea, nausea and vomiting. Genitourinary: Negative for dysuria and hematuria. Musculoskeletal: Positive for back pain (right sided low back). Negative for arthralgias. Skin: Negative for color change and rash. Neurological: Negative for dizziness, seizures, syncope, weakness, light-headedness and headaches. All other systems reviewed and are negative. Physical Exam  Physical Exam  Vitals and nursing note reviewed. Constitutional:       General: He is not in acute distress. Appearance: Normal appearance. He is well-developed. He is not toxic-appearing or diaphoretic. HENT:      Head: Normocephalic and atraumatic.       Right Ear: External ear normal.      Left Ear: External ear normal.      Nose: Nose normal.      Mouth/Throat:      Mouth: Mucous membranes are moist. Eyes:      General: No scleral icterus. Right eye: No discharge. Left eye: No discharge. Conjunctiva/sclera: Conjunctivae normal.   Cardiovascular:      Rate and Rhythm: Normal rate and regular rhythm. Heart sounds: No murmur heard. Pulmonary:      Effort: Pulmonary effort is normal. No respiratory distress. Breath sounds: Normal breath sounds. Abdominal:      Palpations: Abdomen is soft. Tenderness: There is no abdominal tenderness. Musculoskeletal:         General: No swelling, deformity or signs of injury. Normal range of motion. Cervical back: Normal range of motion and neck supple. No rigidity. Back:       Comments: No step offs or deformities. ROM intake. Patient ambulated with cane. Skin:     General: Skin is warm and dry. Capillary Refill: Capillary refill takes less than 2 seconds. Coloration: Skin is not jaundiced. Findings: No erythema or rash. Neurological:      General: No focal deficit present. Mental Status: He is alert and oriented to person, place, and time. Mental status is at baseline. Cranial Nerves: No cranial nerve deficit. Gait: Gait normal.   Psychiatric:         Mood and Affect: Mood normal.         Behavior: Behavior normal.         Thought Content:  Thought content normal.         Judgment: Judgment normal.         Vital Signs  ED Triage Vitals [08/16/23 1035]   Temperature Pulse Respirations Blood Pressure SpO2   98.3 °F (36.8 °C) 59 16 165/74 99 %      Temp Source Heart Rate Source Patient Position - Orthostatic VS BP Location FiO2 (%)   Temporal Monitor Sitting Left arm --      Pain Score       8           Vitals:    08/16/23 1035   BP: 165/74   Pulse: 59   Patient Position - Orthostatic VS: Sitting         Visual Acuity      ED Medications  Medications - No data to display    Diagnostic Studies  Results Reviewed     None                 CT spine lumbar without contrast   Final Result by Scarlett Steven Radha Fuentes MD (08/16 1219)      No acute compression collapse of the vertebra      Multilevel degenerative disc disease with facet joint disease and multilevel foraminal narrowing   Moderate right foraminal narrowing at L5-S1            Workstation performed: [id number]                    Procedures  Procedures         ED Course  ED Course as of 08/16/23 1349   Wed Aug 16, 2023   1231 Waiting PT/OT eval   1342 PT recommending inpatient rehab, patient is agreeable. Medical Decision Making    This is a 41-year-old male present to the emergency department for evaluation of right-sided back pain. Patient reports he believes he may have fell a few weeks ago but denies any recent injury resulting in the pain. Patient reports he has had a decrease in his ambulation but reports it is secondary to the pain. Patient reports he did ambulate around [employer] yesterday while holding onto the cart. Patient reports he has not been taking his daily medications for the past 5 days secondary to "laziness". Denies any other concerns or complaints. Patient is in no acute distress, well-appearing with stable vital signs on initial examination. Differential diagnosis to include but is not limited to: MSk strain/sprain, fracture    Initial ED Plan: imaging, PT/OT eval    ED results:  No acute compression collapse of the vertebra    Multilevel degenerative disc disease with facet joint disease and multilevel foraminal narrowing  Moderate right foraminal narrowing at L5-S1    PT/OT recommending inpatient rehabilitation, patient is agreeable. Final ED assessment: Patient is stable and well appearing. Discussed radiologic studies. Patient verbalized understanding and is agreeable with the plan for admission. Discussed with Dr. Arjun Rose, inpatient, bridging orders placed. Amount and/or Complexity of Data Reviewed  Radiology: ordered.           Disposition  Final diagnoses:   Acute low back pain   Ambulatory dysfunction     Time reflects when diagnosis was documented in both MDM as applicable and the Disposition within this note     Time User Action Codes Description Comment    8/16/2023  1:48 PM Mickieotis Bias Add [M54.50] Acute low back pain     8/16/2023  1:49 PM Mickieotis Bias Add [R26.2] Ambulatory dysfunction       ED Disposition     ED Disposition   Admit    Condition   Stable    Date/Time   Wed Aug 16, 2023  1:48 PM    Comment   Case was discussed with Dr. Galen Bacon and the patient's admission status was agreed to be Admission Status: inpatient status to the service of Dr. Galen Bacon . Follow-up Information    None         Patient's Medications   Discharge Prescriptions    No medications on file       No discharge procedures on file.     PDMP Review     None          ED Provider  Electronically Signed by           Alfredo Samuel PA-C  08/16/23 6854

## 2023-08-16 NOTE — PLAN OF CARE
Problem: OCCUPATIONAL THERAPY ADULT  Goal: Performs self-care activities at highest level of function for planned discharge setting. See evaluation for individualized goals. Description: Treatment Interventions: ADL retraining, Functional transfer training, Endurance training, Cognitive reorientation, Patient/family training, Equipment evaluation/education          See flowsheet documentation for full assessment, interventions and recommendations. Outcome: Progressing  Note: Limitation: Decreased ADL status, Decreased endurance, Decreased high-level ADLs     Assessment: Pt is a 80 y.o. male seen for OT evaluation s/p admit to [facility] on 8/16/2023 w/ Ambulatory dysfunction. Comorbidities affecting pt's functional performance at time of assessment include: HTN, previous surgery, cancer history and CAD. Personal factors affecting pt at time of IE include:steps to enter environment, limited home support, difficulty performing ADLS, difficulty performing IADLS , limited insight into deficits and health management . Prior to admission, pt was independent with ADLs and functional mobility with use of cane. Upon evaluation: Pt requires supervision to Minimal Assistance x1 with cane (would benefit from RW), and Minimal Assistance for some ADLs for safety 2* the following deficits impacting occupational performance: decreased strength, decreased balance, decreased tolerance, impaired problem solving, decreased safety awareness and increased pain. Pt to benefit from continued skilled OT tx while in the hospital to address deficits as defined above and maximize level of functional independence w ADL's and functional mobility. Occupational Performance areas to address include: grooming, bathing/shower, toilet hygiene, dressing and functional mobility. From OT standpoint, recommendation at time of d/c would be inpatient rehab.      OT Discharge Recommendation: Post acute rehabilitation services        [facility] [employer]

## 2023-08-16 NOTE — PLAN OF CARE
Problem: PAIN - ADULT  Goal: Verbalizes/displays adequate comfort level or baseline comfort level  Description: Interventions:  - Encourage patient to monitor pain and request assistance  - Assess pain using appropriate pain scale  - Administer analgesics based on type and severity of pain and evaluate response  - Implement non-pharmacological measures as appropriate and evaluate response  - Consider cultural and social influences on pain and pain management  - Notify physician/advanced practitioner if interventions unsuccessful or patient reports new pain  Outcome: Progressing     Problem: INFECTION - ADULT  Goal: Absence or prevention of progression during hospitalization  Description: INTERVENTIONS:  - Assess and monitor for signs and symptoms of infection  - Monitor lab/diagnostic results  - Monitor all insertion sites, i.e. indwelling lines, tubes, and drains  - Monitor endotracheal if appropriate and nasal secretions for changes in amount and color  - [place] appropriate cooling/warming therapies per order  - Administer medications as ordered  - Instruct and encourage patient and family to use good hand hygiene technique  - Identify and instruct in appropriate isolation precautions for identified infection/condition  Outcome: Progressing  Goal: Absence of fever/infection during neutropenic period  Description: INTERVENTIONS:  - Monitor WBC    Outcome: Progressing     Problem: SAFETY ADULT  Goal: Patient will remain free of falls  Description: INTERVENTIONS:  - Educate patient/family on patient safety including physical limitations  - Instruct patient to call for assistance with activity   - Consult OT/PT to assist with strengthening/mobility   - Keep Call bell within reach  - Keep bed low and locked with side rails adjusted as appropriate  - Keep care items and personal belongings within reach  - Initiate and maintain comfort rounds  - Make Fall Risk Sign visible to staff  - Offer Toileting every  Hours, in advance of need  - Initiate/Maintain alarm  - Obtain necessary fall risk management equipment:  - Apply yellow socks and bracelet for high fall risk patients  - Consider moving patient to room near nurses station  Outcome: Progressing  Goal: Maintain or return to baseline ADL function  Description: INTERVENTIONS:  -  Assess patient's ability to carry out ADLs; assess patient's baseline for ADL function and identify physical deficits which impact ability to perform ADLs (bathing, care of mouth/teeth, toileting, grooming, dressing, etc.)  - Assess/evaluate cause of self-care deficits   - Assess range of motion  - Assess patient's mobility; develop plan if impaired  - Assess patient's need for assistive devices and provide as appropriate  - Encourage maximum independence but intervene and supervise when necessary  - Involve family in performance of ADLs  - Assess for home care needs following discharge   - Consider OT consult to assist with ADL evaluation and planning for discharge  - Provide patient education as appropriate  Outcome: Progressing  Goal: Maintains/Returns to pre admission functional level  Description: INTERVENTIONS:  - Perform BMAT or MOVE assessment daily.   - Set and communicate daily mobility goal to care team and patient/family/caregiver. - Collaborate with rehabilitation services on mobility goals if consulted  - Perform Range of Motion  times a day. - Reposition patient every hours.   - Dangle patient  times a day  - Stand patient times a day  - Ambulate patient times a day  - Out of bed to chair  times a day   - Out of bed for meals  times a day  - Out of bed for toileting  - Record patient progress and toleration of activity level   Outcome: Progressing     Problem: DISCHARGE PLANNING  Goal: Discharge to home or other facility with appropriate resources  Description: INTERVENTIONS:  - Identify barriers to discharge w/patient and caregiver  - Arrange for needed discharge resources and transportation as appropriate  - Identify discharge learning needs (meds, wound care, etc.)  - Arrange for interpretive services to assist at discharge as needed  - Refer to Case Management Department for coordinating discharge planning if the patient needs post-hospital services based on physician/advanced practitioner order or complex needs related to functional status, cognitive ability, or social support system  Outcome: Progressing     Problem: Knowledge Deficit  Goal: Patient/family/caregiver demonstrates understanding of disease process, treatment plan, medications, and discharge instructions  Description: Complete learning assessment and assess knowledge base.   Interventions:  - Provide teaching at level of understanding  - Provide teaching via preferred learning methods  Outcome: Progressing

## 2023-08-16 NOTE — ASSESSMENT & PLAN NOTE
Blood Pressure: (!) 190/90    Plan:  • Medications held: Lisinopril  • Monitor blood pressure  • PRN IV Hydralazine for SBP > 160

## 2023-08-16 NOTE — OCCUPATIONAL THERAPY NOTE
Occupational Therapy Evaluation     Patient Name: Louisa PHIPPS Date: 8/16/2023      Problem List  Principal Problem:    Ambulatory dysfunction  Active Problems:    CAD (coronary artery disease)    Hypertension    Acquired hypothyroidism    Hyperlipemia, mixed    Past Medical History  Past Medical History:   Diagnosis Date    Arthritis     Coronary artery disease     Disease of thyroid gland     Heart disease     Hypertension      Past Surgical History  Past Surgical History:   Procedure Laterality Date    CARDIAC SURGERY      HERNIA REPAIR           08/16/23 1341   OT Last Visit   OT Visit Date 08/16/23   Note Type   Note type Evaluation   Pain Assessment   Pain Assessment Tool 0-10   Pain Score 7   Pain Location/Orientation Orientation: Lower; Location: Back   Pain Onset/Description Onset: Ongoing  (increases with mobility)   Hospital Pain Intervention(s) Ambulation/increased activity   Restrictions/Precautions   Weight Bearing Precautions Per Order No   Other Precautions Fall Risk;Pain   Home Living   Type of [facility] One level;Stairs to enter with rails; Performs ADLs on one level   Bathroom Shower/Tub Tub/shower unit   Bathroom Toilet Standard   Bathroom Equipment Grab bars in shower; Shower chair  (handle on tub, sits to shower)   [address] Walker;Cane  (pt reports using SPC recently)   Prior Function   Level of [place] Independent with ADLs; Independent with functional mobility; Independent with IADLS   Lives With Alone   Receives Help From Family  (pt reports 1 son "lives 5 miles away, but he's busy" and the other lives in [place])   IADLs Independent with driving; Independent with meal prep; Independent with medication management   Falls in the last 6 months 1 to 4  (1-2 falls "I might have tripped on something, but I didn't get hurt. ")   Vocational Retired   General   Family/Caregiver Present No   Subjective   Subjective "I don't need help to get up." Pt agreeable to therapy evaluation   ADL   Where Assessed Other (Comment)  (Assist levels for some self care tasks are based on functional assessment of performance skills and deficits observed during session.)   Eating Assistance 5  Supervision/Setup   Grooming Assistance 5  Supervision/Setup   Grooming Deficit Setup;Supervision/safety  (while seated)   UB Dressing Assistance 4  Minimal Assistance   LB Dressing Assistance 4  Minimal Assistance   Toileting Assistance  4  Minimal Assistance   Bed Mobility   Additional Comments pt received OOB in w/c at start of session, pt remained in the w/c at end of sessio   Transfers   Sit to Stand 5  Supervision   Additional items Assist x 1; Armrests; Increased time required   Stand to Sit 5  Supervision   Additional items Assist x 1; Armrests   Stand pivot 4  Minimal assistance   Additional items Assist x 1; Increased time required;Verbal cues   Additional Comments with SPC. Pt with significant LOB after a few steps, requiring Mod A from therapist to correct. Pt with another LOB in the hallway requiring Min A to correct   Functional Mobility   Functional Mobility 4  Minimal assistance   Additional Comments assist x1   Additional items SPC  (pt would benefit from RW for increased stability.  Pt declined at this time)   Activity Tolerance   Activity Tolerance Patient limited by fatigue   Medical Staff Made Aware Clemencia Best PT  (Pt seen for co-evaluation with Physical Therapist due to pt's medical complexity, functional limitations and limited activity tolerance.)   Nurse Made Aware Tobin Fritz RN, Erickson Cosby RN   RUE Assessment   RUE Assessment WFL  (mild generalized deconditioning ntoed throughout)   LUE Assessment   LUE Assessment WFL  (mild generalized deconditioning ntoed throughout)   Hand Function   Gross Motor Coordination Functional   Fine Motor Coordination Functional   Sensation   Light Touch No apparent deficits   Vision-Basic Assessment   Current Vision Wears glasses only for reading   Visual History Cataracts   Psychosocial   Psychosocial (WDL) WDL   Cognition   Overall Cognitive Status   (questionable)   Arousal/Participation Alert; Responsive   Attention Within functional limits   Orientation Level Oriented to person;Oriented to place  ("August 19th, 2023")   Memory Decreased short term memory   Following Commands Follows one step commands with increased time or repetition   Assessment   Limitation Decreased ADL status; Decreased endurance;Decreased high-level ADLs   Assessment Pt is a 80 y.o. male seen for OT evaluation s/p admit to [facility] on 8/16/2023 w/ Ambulatory dysfunction. Comorbidities affecting pt's functional performance at time of assessment include: HTN, previous surgery, cancer history and CAD. Personal factors affecting pt at time of IE include:steps to enter environment, limited home support, difficulty performing ADLS, difficulty performing IADLS , limited insight into deficits and health management . Prior to admission, pt was independent with ADLs and functional mobility with use of cane. Upon evaluation: Pt requires supervision to Minimal Assistance x1 with cane (would benefit from RW), and Minimal Assistance for some ADLs for safety 2* the following deficits impacting occupational performance: decreased strength, decreased balance, decreased tolerance, impaired problem solving, decreased safety awareness and increased pain. Pt to benefit from continued skilled OT tx while in the hospital to address deficits as defined above and maximize level of functional independence w ADL's and functional mobility. Occupational Performance areas to address include: grooming, bathing/shower, toilet hygiene, dressing and functional mobility. From OT standpoint, recommendation at time of d/c would be inpatient rehab. Goals   Patient Goals to go to rehab   Plan   Treatment Interventions ADL retraining;Functional transfer training; Endurance training;Cognitive reorientation;Patient/family training;Equipment evaluation/education   Goal Expiration Date 08/30/23   OT Treatment Day 0   OT Frequency 3-5x/wk   Recommendation   OT Discharge Recommendation Post acute rehabilitation services   Additional Comments  The patient's raw score on the AM-PAC Daily Activity Inpatient Short Form is 19. A raw score of greater than or equal to 19 suggests the patient may benefit from discharge to home. Please refer to the recommendation of the Occupational Therapist for safe discharge planning.    AM-PAC Daily Activity Inpatient   Lower Body Dressing 3   Bathing 3   Toileting 3   Upper Body Dressing 3   Grooming 3   Eating 4   Daily Activity Raw Score 19   Daily Activity Standardized Score (Calc for Raw Score >=11) 40.22   AM-PAC Applied Cognition Inpatient   Following a Speech/Presentation 4   Understanding Ordinary Conversation 4   Taking Medications 3   Remembering Where Things Are Placed or Put Away 3   Remembering List of 4-5 Errands 2   Taking Care of Complicated Tasks 2   Applied Cognition Raw Score 18   Applied Cognition Standardized Score 38.07       Goals: to be met by 8/30/23    Patient will perform functional bed mobility with Standby Assistance, with HOB flat, no rails  Patient will perform functional transfers with Standby Assistanceu sing LRAD in preparation for ADL tasks, with good safety awareness  Patient will perform UB dressing task with [address] while seated, with set up  Patient will perform LB dressing task with [address]  Patient will perform toilet transfer with [address]   Patient will perform toileting with [address], including hygiene and clothing management   Patient will increase BUE strength by 1 MM grade in preparation to increase ability to participate in ADL tasks  Patient will improve activity tolerance by participating in 20 minutes of session at a time in preparation for participation in ADL tasks  Patient will identify 3 potential fall hazards and identify compensatory techniques to decrease fall risk in the home environment  Patient will attend to 100% of cognitive task during session          Paulino Tate, OTR/L

## 2023-08-16 NOTE — PHYSICAL THERAPY NOTE
Physical Therapy Evaluation     Patient's Name: Kathy Jones    Admitting Diagnosis  Back pain [M54.9]    Problem List  Patient Active Problem List   Diagnosis    CAD (coronary artery disease)    Hypertension    Hypothyroid    Mitral valve disorder    Hyperlipemia, mixed    History of prostate cancer       Past Medical History  Past Medical History:   Diagnosis Date    Arthritis     Coronary artery disease     Disease of thyroid gland     Heart disease     Hypertension        Past Surgical History  Past Surgical History:   Procedure Laterality Date    CARDIAC SURGERY      HERNIA REPAIR            08/16/23 1325   PT Last Visit   PT Visit Date 08/16/23   Note Type   Note type Evaluation   Pain Assessment   Pain Assessment Tool 0-10   Pain Score 7   Pain Location/Orientation Orientation: Lower; Location: Back   Pain Onset/Description Onset: Ongoing  (increases with mobility)   Hospital Pain Intervention(s) Ambulation/increased activity; Emotional support   Restrictions/Precautions   Weight Bearing Precautions Per Order No   Other Precautions Fall Risk;Pain   Home Living   Type of [facility] One level;Stairs to enter with rails; Performs ADLs on one level  (5 SAADIA)   Bathroom Shower/Tub Tub/shower unit   Bathroom Toilet Standard   Bathroom Equipment Grab bars in shower; Shower chair  (handle to get into tub, uses shower chair)   [address] Cane;Walker  (pt uses SPC recently)   Prior Function   Level of [place] Independent with ADLs; Independent with functional mobility; Independent with IADLS   Lives With Alone   Receives Help From Family  (son lives ~5miles away, "but he's always busy")   IADLs Independent with driving; Independent with meal prep; Independent with medication management   Falls in the last 6 months 1 to 4  ("I might've fell a couple weeks ago, I might of tripped on something")   Vocational Retired   General   Family/Caregiver Present No Cognition   Overall Cognitive Status   (questionable)   Arousal/Participation Alert   Orientation Level Oriented to person;Oriented to place;Oriented to situation  ("August 19th, 2023")   Memory Decreased short term memory;Decreased recall of recent events;Decreased recall of precautions   Following Commands Follows one step commands inconsistently   RLE Assessment   RLE Assessment   (grossly 4-/5 observed with functional mobility)   LLE Assessment   LLE Assessment   (grossly 4-/5 observed with functional mobility)   Vision-Basic Assessment   Current Vision Wears glasses only for reading   Visual History Cataracts   Coordination   Movements are Fluid and Coordinated 1   Sensation WFL   Bed Mobility   Additional Comments pt received OOB upon arrival   Transfers   Sit to Stand 5  Supervision   Additional items Assist x 1; Armrests; Increased time required;Verbal cues   Stand to Sit 5  Supervision   Additional items Assist x 1; Armrests; Increased time required;Verbal cues   Additional Comments pt denying any lightheadedness/dizziness with transitional movements   Ambulation/Elevation   Gait pattern Decreased foot clearance; Short stride; Step to; Wide PEREZ  (unsteady)   Gait Assistance 4  Minimal assist   Additional items Assist x 1;Verbal cues; Tactile cues   Assistive Device Straight cane   Distance 30'; pt with noted significant uncorrected LOB requiring max A for correction from therapist to prevent fall   Balance   Static Sitting Fair +   Dynamic Sitting Fair   Static Standing Fair -   Dynamic Standing Poor +   Ambulatory Poor   Endurance Deficit   Endurance Deficit Yes   Activity Tolerance   Activity Tolerance Patient limited by fatigue   Medical Staff Made Aware OT Sada Medel. VITO Nation   Nurse Made Aware RN Yury Barahona and Aniyah Watts   Assessment   Prognosis Good   Problem List Decreased strength;Decreased endurance; Impaired balance;Decreased mobility; Decreased safety awareness;Pain   Assessment Pt is 80 y.o. male seen for PT evaluation on 8/16/2023 s/p admit to [id number] on 8/16/2023 w/ back pain. PT was consulted to assess pt's functional mobility and d/c needs. Order placed for PT eval and tx. PTA, pt resides alone in mobile home with 5 SAADIA, ambulates with SPC prn; + fall history. At time of eval, pt performing transfers SBA, household distance gait trial min A, requiring max A d/t uncorrected LOB. Upon evaluation, pt presenting with impaired functional mobility d/t decreased strength, decreased endurance, impaired balance, decreased mobility, decreased safety awareness, pain and activity intolerance. Pertinent PMHx and current co-morbidities affecting pt's physical performance at time of assessment include: CAD, HTN, hypothyroid, mitral valve disorder, HLD, h/o prostate CA. Personal factors affecting pt at time of eval include: ambulating w/ assistive device, stairs to enter home, inability to navigate community distances, inability to navigate level surfaces w/o external assistance, limited home support and positive fall history. The following objective measures performed on IE also reveal limitations: Barthel Index: 45/100, Modified Jeana: 4 (moderate/severe disability) and AM-PAC 6-Clicks: 63/02. Pt's clinical presentation is currently unstable/unpredictable seen in pt's presentation of advanced age, abnormal lab value(s), need for input for task focus and mobility technique and ongoing medical assessment. Overall, pt's rehab potential and prognosis to return to PLOF is good as impacted by objective findings, warranting pt to receive further skilled PT interventions to address identified impairments, activity limitation(s), and participation restriction(s). Pt to benefit from continued PT tx to address deficits as defined above and maximize level of functional independent mobility.  From PT/mobility standpoint, recommendation at time of d/c would be post acute rehabilitation services pending progress in order to facilitate return to PLOF. Barriers to Discharge Inaccessible home environment;Decreased caregiver support   Goals   STG Expiration Date 08/26/23   Short Term Goal #1 In 7-10 days: Increase bilateral LE strength 1/2 grade to facilitate independent mobility, Perform all bed mobility tasks modified independent to decrease caregiver burden, Perform all transfers modified independent to improve independence, Ambulate > 250 ft. with least restrictive assistive device modified independent w/o LOB and w/ normalized gait pattern 100% of the time, Navigate 5 stairs modified independent with unilateral handrail to facilitate return to previous living environment, Increase all balance 1/2 grade to decrease risk for falls, Improve Barthel Index score to 60 or greater to facilitate independence and PT provider will perform functional balance assessment to determine fall risk   PT Treatment Day 0   Plan   Treatment/Interventions Functional transfer training;LE strengthening/ROM; Therapeutic exercise; Endurance training;Patient/family training;Equipment eval/education; Bed mobility;Gait training;Elevations; Spoke to nursing   PT Frequency 3-5x/wk   Recommendation   PT Discharge Recommendation Post acute rehabilitation services   AM-PAC Basic Mobility Inpatient   Turning in Flat Bed Without Bedrails 3   Lying on Back to Sitting on Edge of Flat Bed Without Bedrails 3   Moving Bed to Chair 3   Standing Up From Chair Using Arms 3   Walk in Room 2   Climb 3-5 Stairs With Railing 2   Basic Mobility Inpatient Raw Score 16   Basic Mobility Standardized Score 38.32   Highest Level Of Mobility   -HLM Goal 5: Stand one or more mins   JH-HLM Achieved 7: Walk 25 feet or more   Modified Livingston Scale   Modified [place]ston Scale 4   Barthel Index   Feeding 10   Bathing 0   Grooming Score 0   Dressing Score 5   Bladder Score 10   Bowels Score 10   Toilet Use Score 5   Transfers (Bed/Chair) Score 5   Mobility (Level Surface) Score 0   Stairs Score 0 Barthel Index Score [address], PT, DPT

## 2023-08-16 NOTE — H&P
1220 Tomi Nielsen  H&P  Name: [place] 80 y.o. male I MRN: [id number]  Unit/Bed#: -01 I Date of Admission: 8/16/2023   Date of Service: 8/16/2023 I Hospital Day: 0      Assessment/Plan   * ROMAN (acute kidney injury) [facility])  Assessment & Plan  Recent Labs     08/16/23  1420   CREATININE 1.78*   EGFR 32     CrCl cannot be calculated (Unknown ideal weight. ). • POA; (baseline 1.1 in 2018)  • Likely prerenal leukopenia secondary to decreased oral intake    Plan:  • UA w/ microscopic, Urinary retention protocol, Bladder scan, Daily weights, I/O  • IV Fluids Plasma-Lyte at 100 mL/h  • Monitor BMP daily and observe for downward trend of creatinine  • Avoid hypoperfusion of the kidneys, minimize nephrotoxins  • RAAS Blockers/Diuretics held; lisinopril      Ambulatory dysfunction  Assessment & Plan  • PT/OT Eval and treat  AM-PAC Basic Mobility:  Basic Mobility Inpatient Raw Score: 16    JH-HL Achieved: 7: Walk 25 feet or more  -HLM Goal: 5: Stand one or more mins  • Encourage appropriate mobility to achieve and improve upon [facility] Goals as noted  • [place] for placement to rehab    Hyperlipemia, mixed  Assessment & Plan  Continue Statin    Acquired hypothyroidism  Assessment & Plan  Continue home levothyroxine    Primary hypertension  Assessment & Plan  Blood Pressure: (!) 190/90    Plan:  • Medications held: Lisinopril  • Monitor blood pressure  • PRN IV Hydralazine for SBP > 160      Coronary artery disease involving native coronary artery of native heart without angina pectoris  Assessment & Plan  Continue beta-blocker; not in acute chest pain at this time         VTE Pharmacologic Prophylaxis: VTE Score: 3 Moderate Risk (Score 3-4) - Pharmacological DVT Prophylaxis Ordered: heparin.   Code Status: Level 1 - Full Code   Discussion with Patient/Family: Patient and son over the phone    Anticipated Length of Stay: Patient will be admitted on an inpatient basis with an anticipated length of stay of greater than 2 midnights secondary to Placement to rehab. Total Time for Visit, including Counseling / Coordination of Care: 85 minutes Greater than 50% of this total time spent on direct patient counseling and coordination of care. Chief Complaint:   Chief Complaint   Patient presents with   • Back Pain     Pt reports lower back pain for the past 5 days. Denies injury he can recall. Diff ambulating because of it. History of Present Illness:  Felicity Mcrae is a 80 y.o. male who presents with Right-sided low back pain along with ambulatory dysfunction for the past 5 days.      PMHx CAD, HLD, hypothyroidism, HTN, BPH      Reportedly had a ground-level fall a few weeks prior but does not remember recent fall or injury prior to the 5 days of pain prior to presentation to the ED. Reportedly lives alone at home and has noted to have decreased ambulation due to the pain. On arrival to the ED, CT imaging obtained and noted to be negative for any acute compression fracture. PT and OT evaluated the patient recommending inpatient rehab to which patient is agreeable. Patient also reported noncompliance to medication regimen    Patient's son was on the phone at bedside and overall plan was discussed with patient and patient's son. Patient is planned to be moving in with patient's son in the next few weeks but at this time, patient and patient's son is agreeable to having patient go to rehab facility once pain is adequately controlled. Review of Systems:  A 10-point review of systems was obtained. Pertinent positives and negatives are outlined in the HPI above. Remainder of review of systems are otherwise negative.      Past Medical and Surgical History:   Past Medical History:   Diagnosis Date   • Arthritis    • Coronary artery disease    • Disease of thyroid gland    • Heart disease    • Hypertension        Past Surgical History:   Procedure Laterality Date   • CARDIAC SURGERY     • HERNIA REPAIR         Meds/Allergies:  Prior to Admission medications    Medication Sig Start Date End Date Taking? Authorizing Provider   aspirin (ECOTRIN LOW STRENGTH) 81 mg EC tablet Take 81 mg by mouth daily    Historical Provider, MD   atorvastatin (LIPITOR) 40 mg tablet Take 1 tablet (40 mg total) by mouth daily with dinner 4/25/18   Marcy Mckeon MD   cyanocobalamin 1000 MCG tablet Take 100 mcg by mouth daily    Historical Provider, MD   levothyroxine (SYNTHROID) 88 mcg tablet Take 1 tablet (88 mcg total) by mouth daily 4/25/18   Marcy Mckeon MD   metoprolol succinate (TOPROL-XL) 25 mg 24 hr tablet Take 1 tablet (25 mg total) by mouth daily 4/25/18   Marcy Mckeon MD   nitroglycerin (NITROSTAT) 0.4 mg SL tablet Place 0.4 mg under the tongue every 5 (five) minutes as needed for chest pain    Historical Provider, MD   ramipril (ALTACE) 5 mg capsule Take 1 capsule (5 mg total) by mouth daily 4/25/18   Marcy Mckeon MD   tamsulosin (FLOMAX) 0.4 mg  2/24/20   Historical Provider, MD     I have reviewed home medications with patient personally. Allergies:    Allergies   Allergen Reactions   • Penicillins Swelling   • Keflex [Cephalexin]    • Prednisone Other (See Comments)     Pt states "i don't know, I break out"       Social History:  Marital Status: Single   Occupation: Retired   Patient Pre-hospital Living Situation: Home  Patient Pre-hospital Level of Mobility: unable to be assessed at time of evaluation  Patient Pre-hospital Diet Restrictions: None  Substance Use History:   Social History     Substance and Sexual Activity   Alcohol Use Yes    Comment: socially     Social History     Tobacco Use   Smoking Status Some Days   • Types: Pipe   Smokeless Tobacco Never     Social History     Substance and Sexual Activity   Drug Use No       Family History:  Family History   Problem Relation Age of Onset   • No Known Problems Mother    • No Known Problems Father        Physical Exam:     Vitals: Blood Pressure: (!) 190/90 (08/16/23 1533)  Pulse: 59 (08/16/23 1533)  Temperature: (!) 97.3 °F (36.3 °C) (08/16/23 1533)  Temp Source: Temporal (08/16/23 1035)  Respirations: 16 (08/16/23 1533)  SpO2: 98 % (08/16/23 1533)    Physical Exam  Vitals and nursing note reviewed. Constitutional:       General: He is not in acute distress. Appearance: He is well-developed. He is ill-appearing (Chronically). He is not diaphoretic. HENT:      Head: Normocephalic and atraumatic. Nose: Nose normal.   Eyes:      General: No scleral icterus. Conjunctiva/sclera: Conjunctivae normal.      Pupils: Pupils are equal, round, and reactive to light. Neck:      Thyroid: No thyromegaly. Cardiovascular:      Rate and Rhythm: Normal rate and regular rhythm. Heart sounds: Normal heart sounds. No murmur heard. No friction rub. No gallop. Pulmonary:      Effort: Pulmonary effort is normal. No respiratory distress. Breath sounds: Normal breath sounds. No stridor. No wheezing or rales. Abdominal:      General: Bowel sounds are normal. There is no distension. Palpations: Abdomen is soft. There is no mass. Tenderness: There is no abdominal tenderness. Musculoskeletal:         General: Tenderness (Low back pain right side greater than left near the quadratus lumborum) present. No swelling or deformity. Cervical back: Neck supple. Skin:     General: Skin is warm and dry. Neurological:      Mental Status: He is alert and oriented to person, place, and time. Mental status is at baseline. Psychiatric:         Behavior: Behavior normal.         Thought Content:  Thought content normal.         Judgment: Judgment normal.            Additional Data:     Lab Results:  Results from last 7 days   Lab Units 08/16/23  1420   WBC Thousand/uL 6.18   HEMOGLOBIN g/dL 12.2   HEMATOCRIT % 38.4   PLATELETS Thousands/uL 175   NEUTROS PCT % 70   LYMPHS PCT % 13*   MONOS PCT % 14*   EOS PCT % 2     Results from last 7 days   Lab Units 08/16/23  1420   SODIUM mmol/L 138   POTASSIUM mmol/L 4.4   CHLORIDE mmol/L 107   CO2 mmol/L 23   BUN mg/dL 31*   CREATININE mg/dL 1.78*   ANION GAP mmol/L 8   CALCIUM mg/dL 9.6   ALBUMIN g/dL 4.2   TOTAL BILIRUBIN mg/dL 0.64   ALK PHOS U/L 65   ALT U/L 6*   AST U/L 14   GLUCOSE RANDOM mg/dL 92                       Imaging Results Reviewed as noted below  CT spine lumbar without contrast   Final Result by Jordan Lizarraga MD (08/16 1219)      No acute compression collapse of the vertebra      Multilevel degenerative disc disease with facet joint disease and multilevel foraminal narrowing   Moderate right foraminal narrowing at L5-S1            Workstation performed: [id number]             CT spine lumbar without contrast    Result Date: 8/16/2023  Impression: No acute compression collapse of the vertebra Multilevel degenerative disc disease with facet joint disease and multilevel foraminal narrowing Moderate right foraminal narrowing at L5-S1 Workstation performed: [id number]     No Chest XR results available for this patient. EKG and Other Studies Reviewed on Admission:   · EKG: None available    No results for input(s): "VENTRATE" in the last 72 hours. ** Please Note: This note has been constructed using a voice recognition system.  **

## 2023-08-16 NOTE — ASSESSMENT & PLAN NOTE
Recent Labs     08/16/23  1420   CREATININE 1.78*   EGFR 32     CrCl cannot be calculated (Unknown ideal weight. ).     • POA; (baseline 1.1 in 2018)  • Likely prerenal leukopenia secondary to decreased oral intake    Plan:  • UA w/ microscopic, Urinary retention protocol, Bladder scan, Daily weights, I/O  • IV Fluids Plasma-Lyte at 100 mL/h  • Monitor BMP daily and observe for downward trend of creatinine  • Avoid hypoperfusion of the kidneys, minimize nephrotoxins  • RAAS Blockers/Diuretics held; lisinopril

## 2023-08-16 NOTE — ASSESSMENT & PLAN NOTE
• PT/OT Eval and treat  AM-PAC Basic Mobility:  Basic Mobility Inpatient Raw Score: 16    JH-HLM Achieved: 7: Walk 25 feet or more  JH-HLM Goal: 5: Stand one or more mins  • Encourage appropriate mobility to achieve and improve upon [facility] Goals as noted  • Eager for placement to rehab

## 2023-08-16 NOTE — PROGRESS NOTES
Patient answered all admission questions, however refused a full skin assessment, stating "my skin is fine, you don't need to look at it".

## 2023-08-17 PROBLEM — R41.3 MEMORY LOSS: Status: ACTIVE | Noted: 2023-08-17

## 2023-08-17 LAB
ALBUMIN SERPL BCP-MCNC: 3.5 G/DL (ref 3.5–5)
ALP SERPL-CCNC: 54 U/L (ref 34–104)
ALT SERPL W P-5'-P-CCNC: 7 U/L (ref 7–52)
ANION GAP SERPL CALCULATED.3IONS-SCNC: 5 MMOL/L
AST SERPL W P-5'-P-CCNC: 10 U/L (ref 13–39)
BILIRUB SERPL-MCNC: 0.52 MG/DL (ref 0.2–1)
BUN SERPL-MCNC: 30 MG/DL (ref 5–25)
CALCIUM SERPL-MCNC: 8.9 MG/DL (ref 8.4–10.2)
CHLORIDE SERPL-SCNC: 107 MMOL/L (ref 96–108)
CO2 SERPL-SCNC: 26 MMOL/L (ref 21–32)
CREAT SERPL-MCNC: 1.59 MG/DL (ref 0.6–1.3)
ERYTHROCYTE [DISTWIDTH] IN BLOOD BY AUTOMATED COUNT: 13.8 % (ref 11.6–15.1)
GFR SERPL CREATININE-BSD FRML MDRD: 37 ML/MIN/1.73SQ M
GLUCOSE SERPL-MCNC: 84 MG/DL (ref 65–140)
HCT VFR BLD AUTO: 32 % (ref 36.5–49.3)
HGB BLD-MCNC: 10.5 G/DL (ref 12–17)
MAGNESIUM SERPL-MCNC: 2.2 MG/DL (ref 1.9–2.7)
MCH RBC QN AUTO: 32.1 PG (ref 26.8–34.3)
MCHC RBC AUTO-ENTMCNC: 32.8 G/DL (ref 31.4–37.4)
MCV RBC AUTO: 98 FL (ref 82–98)
PLATELET # BLD AUTO: 146 THOUSANDS/UL (ref 149–390)
PMV BLD AUTO: 9.5 FL (ref 8.9–12.7)
POTASSIUM SERPL-SCNC: 4 MMOL/L (ref 3.5–5.3)
PROT SERPL-MCNC: 5.3 G/DL (ref 6.4–8.4)
RBC # BLD AUTO: 3.27 MILLION/UL (ref 3.88–5.62)
SODIUM SERPL-SCNC: 138 MMOL/L (ref 135–147)
WBC # BLD AUTO: 4.78 THOUSAND/UL (ref 4.31–10.16)

## 2023-08-17 PROCEDURE — 99233 SBSQ HOSP IP/OBS HIGH 50: CPT | Performed by: NURSE PRACTITIONER

## 2023-08-17 PROCEDURE — 85027 COMPLETE CBC AUTOMATED: CPT | Performed by: INTERNAL MEDICINE

## 2023-08-17 PROCEDURE — 83735 ASSAY OF MAGNESIUM: CPT | Performed by: INTERNAL MEDICINE

## 2023-08-17 PROCEDURE — 80053 COMPREHEN METABOLIC PANEL: CPT | Performed by: INTERNAL MEDICINE

## 2023-08-17 RX ORDER — SODIUM CHLORIDE 9 MG/ML
100 INJECTION, SOLUTION INTRAVENOUS CONTINUOUS
Status: DISPENSED | OUTPATIENT
Start: 2023-08-17 | End: 2023-08-18

## 2023-08-17 RX ADMIN — HEPARIN SODIUM 5000 UNITS: 5000 INJECTION INTRAVENOUS; SUBCUTANEOUS at 16:34

## 2023-08-17 RX ADMIN — SODIUM CHLORIDE 100 ML/HR: 0.9 INJECTION, SOLUTION INTRAVENOUS at 22:06

## 2023-08-17 RX ADMIN — HEPARIN SODIUM 5000 UNITS: 5000 INJECTION INTRAVENOUS; SUBCUTANEOUS at 05:16

## 2023-08-17 RX ADMIN — OXYCODONE HYDROCHLORIDE 5 MG: 5 TABLET ORAL at 21:25

## 2023-08-17 RX ADMIN — OXYCODONE HYDROCHLORIDE 5 MG: 5 TABLET ORAL at 04:46

## 2023-08-17 RX ADMIN — SODIUM CHLORIDE 100 ML/HR: 0.9 INJECTION, SOLUTION INTRAVENOUS at 10:58

## 2023-08-17 RX ADMIN — VITAM B12 100 MCG: 100 TAB at 10:04

## 2023-08-17 RX ADMIN — LEVOTHYROXINE SODIUM 88 MCG: 88 TABLET ORAL at 05:16

## 2023-08-17 RX ADMIN — ATORVASTATIN CALCIUM 40 MG: 40 TABLET, FILM COATED ORAL at 16:34

## 2023-08-17 RX ADMIN — ASPIRIN 81 MG: 81 TABLET, COATED ORAL at 10:04

## 2023-08-17 RX ADMIN — Medication 3 MG: at 21:25

## 2023-08-17 RX ADMIN — LIDOCAINE 5% 1 PATCH: 700 PATCH TOPICAL at 10:04

## 2023-08-17 RX ADMIN — SODIUM CHLORIDE, SODIUM GLUCONATE, SODIUM ACETATE, POTASSIUM CHLORIDE, MAGNESIUM CHLORIDE, SODIUM PHOSPHATE, DIBASIC, AND POTASSIUM PHOSPHATE 100 ML/HR: .53; .5; .37; .037; .03; .012; .00082 INJECTION, SOLUTION INTRAVENOUS at 04:19

## 2023-08-17 RX ADMIN — TAMSULOSIN HYDROCHLORIDE 0.4 MG: 0.4 CAPSULE ORAL at 16:34

## 2023-08-17 RX ADMIN — HEPARIN SODIUM 5000 UNITS: 5000 INJECTION INTRAVENOUS; SUBCUTANEOUS at 21:25

## 2023-08-17 NOTE — ASSESSMENT & PLAN NOTE
Recent Labs     08/16/23  1420 08/17/23  0505   CREATININE 1.78* 1.59*   EGFR 32 37     Estimated Creatinine Clearance: 25.8 mL/min (A) (by C-G formula based on SCr of 1.59 mg/dL (H)). • POA; Further chart reviewed revealed Cr.  Baseline likely closely to 1.3   • Likely prerenal leukopenia secondary to decreased oral intake    Plan:  • UA w/ microscopic, Urinary retention protocol, Bladder scan, Daily weights, I/O  • IV Fluids Plasma-Lyte at 100 mL/h improved slightly will will switch to NSS at 100ml/hr  • Monitor BMP daily and observe for downward trend of creatinine  • Avoid hypoperfusion of the kidneys, minimize nephrotoxins  • Ramipril held of admission

## 2023-08-17 NOTE — CASE MANAGEMENT
Case Management Assessment & Discharge Planning Note    Patient name [facility]  Location [address] [facility] Justin 427/-20 MRN [id number]  : 1930 Date 2023       Current Admission Date: 2023  Current Admission Diagnosis:ROMAN (acute kidney injury) [facility])   Patient Active Problem List    Diagnosis Date Noted   • Memory loss 2023   • Ambulatory dysfunction 2023   • ROMAN (acute kidney injury) ([address]) 2023   • History of prostate cancer 2019   • Mitral valve disorder 2018   • Hyperlipemia, mixed 2018   • Primary hypertension 2018   • Acquired hypothyroidism 2018   • Coronary artery disease involving native coronary artery of native heart without angina pectoris 04/15/2018      LOS (days): 1  Geometric Mean LOS (GMLOS) (days): 2.80  Days to GMLOS:1.7     OBJECTIVE:    Risk of Unplanned Readmission Score: 10.97      Current admission status: Inpatient     Preferred Pharmacy:   [address] [place] [id number]  Phone: [phone] Fax: [phone] for [facility] [place],  [id number] [place] [address]  Phone: [phone] Fax: [phone]    Primary Care Provider: Rolanda Fox PA-C    Primary Insurance: MEDICARE  Secondary Insurance: COMMERCIAL MISCELLANEOUS    ASSESSMENT:  [id number] [facility], [address] [facility],[address] Representative - Son   Primary Phone: [phone] (Home)               Advance Directives  Does patient have a [address] [place] [address] Avenue?: Yes  Does patient have Advance Directives?: No  Was patient offered paperwork?: Yes  Primary Contact: Randell Hansen    Readmission Root Cause  30 Day Readmission: No    Patient Information  Admitted from[de-identified] Home  Mental Status: Alert  During Assessment patient was accompanied by: Not accompanied during assessment  Assessment information provided by[de-identified] Patient  Primary Caregiver: Self  Support Systems: Self, Son  [place] of Residence: [address] [place] [address] do you live in?: [facility] entry access options.  Select all that apply.: Other access (Comment) (2 bedroom trailer, 2 steps with rail)  Type of Current Residence: [facility]  In the last 12 months, was there a time when you were not able to pay the mortgage or rent on time?: No  In the last 12 months, how many places have you lived?: 1  Homeless/housing insecurity resource given?: N/A  Living Arrangements: Lives Alone  Is patient a ?: Yes  Is patient active with [facility])?: No    Activities of Daily Living Prior to Admission  Functional Status: Assistance  Completes ADLs independently?: Yes  Ambulates independently?: Yes  Does patient use assisted devices?: Yes  Assisted Devices (DME) used: Alice Albright  Does patient currently own DME?: Yes  What DME does the patient currently own?: Yemi Chanel  Does patient have a history of Outpatient Therapy (PT/OT)?: No  Does the patient have a history of Short-Term Rehab?: No  Does patient have a history of HHC?: No  Does patient currently have [address]?: No     Patient Information Continued  Income Source: SSI/SSD  Does patient have prescription coverage?: Yes ([employer])  Within the past 12 months, you worried that your food would run out before you got the money to buy more.: Never true  Within the past 12 months, the food you bought just didn't last and you didn't have money to get more.: Never true  Food insecurity resource given?: N/A  Does patient receive dialysis treatments?: No  Does patient have a history of substance abuse?: No  Does patient have a history of Mental Health Diagnosis?: No    Means of Transportation  Means of Transport to Appts[de-identified] Drives Self  In the past 12 months, has lack of transportation kept you from medical appointments or from getting medications?: No  In the past 12 months, has lack of transportation kept you from meetings, work, or from getting things needed for daily living?: No  Was application for public transport provided?: N/A    DISCHARGE DETAILS:    Discharge planning discussed with[de-identified] Patient  Freedom of Choice: Yes  Comments - Freedom of Choice: CM discussed recommended discharge planning with patient. CM explained Freedom of Choice with patient. Patient understood and agreed about choices. Patient has declined physician recommendation for STR or HHC. CM contacted family/caregiver?: No- see comments (Patient declined.)  Were Treatment Team discharge recommendations reviewed with patient/caregiver?: Yes  Did patient/caregiver verbalize understanding of patient care needs?: Yes  Were patient/caregiver advised of the risks associated with not following Treatment Team discharge recommendations?: Yes    [address]         Is the patient interested in [address] at discharge?: No    DME Referral Provided  Referral made for DME?: No    Other Referral/Resources/Interventions Provided:  Interventions: None Indicated  Referral Comments: CM offered STR and HHC in which patient declined both options.     Would you like to participate in our [facility] [employer] service program?  : No - Declined    Treatment Team Recommendation: Short Term Rehab  Discharge Destination Plan[de-identified] Home  Transport at Discharge : Family

## 2023-08-17 NOTE — PROGRESS NOTES
[address]  Progress Note  Name: Annetta Shaver  MRN: [id number]  Unit/Bed#: -72 I Date of Admission: 8/16/2023   Date of Service: 8/17/2023 I Hospital Day: 1    Assessment/Plan   * ROMAN (acute kidney injury) [facility])  Assessment & Plan  Recent Labs     08/16/23  1420 08/17/23  0505   CREATININE 1.78* 1.59*   EGFR 32 37     Estimated Creatinine Clearance: 25.8 mL/min (A) (by C-G formula based on SCr of 1.59 mg/dL (H)). • POA; Further chart reviewed revealed Cr. Baseline likely closely to 1.3   • Likely prerenal leukopenia secondary to decreased oral intake    Plan:  • UA w/ microscopic, Urinary retention protocol, Bladder scan, Daily weights, I/O  • IV Fluids Plasma-Lyte at 100 mL/h improved slightly will will switch to NSS at 100ml/hr  • Monitor BMP daily and observe for downward trend of creatinine  • Avoid hypoperfusion of the kidneys, minimize nephrotoxins  • Ramipril held of admission      Ambulatory dysfunction  Assessment & Plan  • PT/OT Eval and treat recommending post acute rehab services  · Patient came in with reported back pain. · CT lumbar spine obtained: No acute compression collapse of the vertebra Multilevel degenerative disc disease with facet joint disease and multilevel foraminal narrowing Moderate right foraminal narrowing at L5-S1  · Conservative measure PT/OT eval pain management   · Responding to lidoderm and low dose oxy. AM-PAC Basic Mobility:  Basic Mobility Inpatient Raw Score: 21    -HLM Achieved: 6: Walk 10 steps or more  -HLM Goal: 6: Walk 10 steps or more  • Encourage appropriate mobility to achieve and improve upon [facility] Goals as noted  • Eager for placement to rehab    Memory loss  Assessment & Plan  · DIL concerned due to progressive memory loss.  Getting lost driving since removed care from his resident as he was driving to [place] alone but other times lost.  · Will check TSH, B12, and folate   · No infection appreciate  · If work up negative should follow up with PCP for further work up   · Recommend NO DRIVING UNTIL SEEN BY PCP WITH FAMILY    Hyperlipemia, mixed  Assessment & Plan  · Continue Statin    Acquired hypothyroidism  Assessment & Plan  · Continue home levothyroxine    Primary hypertension  Assessment & Plan  Blood Pressure: 124/57    Plan:  • Medications held: Lisinopril  • Monitor blood pressure  • PRN IV Hydralazine for SBP > 160      Coronary artery disease involving native coronary artery of native heart without angina pectoris  Assessment & Plan  · Continue beta-blocker; not in acute chest pain at this time               VTE Pharmacologic Prophylaxis: VTE Score: 3 Moderate Risk (Score 3-4) - Pharmacological DVT Prophylaxis Ordered: heparin. Patient Centered Rounds: I performed bedside rounds with nursing staff today. Discussions with Specialists or Other Care Team Provider: lisa    Education and Discussions with Family / Patient: Updated  (son and daughter in law) via phone. Total Time Spent on Date of Encounter in care of patient: 45 minutes This time was spent on one or more of the following: performing physical exam; counseling and coordination of care; obtaining or reviewing history; documenting in the medical record; reviewing/ordering tests, medications or procedures; communicating with other healthcare professionals and discussing with patient's family/caregivers. Current Length of Stay: 1 day(s)  Current Patient Status: Inpatient   Certification Statement: The patient will continue to require additional inpatient hospital stay due to ROMAN with need for IVF  Discharge Plan: Anticipate discharge tomorrow to rehab facility. Code Status: Level 1 - Full Code    Subjective:   Patient pleasant reporting current regimen for his back is helping his pain. Patient denies burning, tingling, numbness loss of bowel or bladder. Can move his extremities on command.  Imaging studies discussed with son and DIL conservative management recommended with therapy and pain control. Both agreeable in separate phone conversations. EVETTE raised concerned as paitent has been more confused and forgetful which has become progressive he has driven places and gotten lost needing her to come get him. He further thought when being brought thought it was an ice cream shop. EVETTE removed his car from the property but needs help on next steps made aware will do a general work up to start but will need to return to PCP for further indepth work up. Objective:     Vitals:   Temp (24hrs), Av °F (36.7 °C), Min:97.3 °F (36.3 °C), Max:98.2 °F (36.8 °C)    Temp:  [97.3 °F (36.3 °C)-98.2 °F (36.8 °C)] 98.1 °F (36.7 °C)  HR:  [46-59] 46  Resp:  [16-18] 18  BP: (124-190)/(54-90) 124/57  SpO2:  [95 %-98 %] 95 %  Body mass index is 24.63 kg/m². Input and Output Summary (last 24 hours): Intake/Output Summary (Last 24 hours) at 2023 1351  Last data filed at 2023 1058  Gross per 24 hour   Intake 1845 ml   Output 350 ml   Net 1495 ml       Physical Exam:   Physical Exam  Vitals and nursing note reviewed. Constitutional:       General: He is not in acute distress. Appearance: He is well-developed. HENT:      Head: Normocephalic and atraumatic. Eyes:      Conjunctiva/sclera: Conjunctivae normal.   Cardiovascular:      Rate and Rhythm: Normal rate and regular rhythm. Heart sounds: No murmur heard. Pulmonary:      Effort: Pulmonary effort is normal. No respiratory distress. Breath sounds: Normal breath sounds. Abdominal:      Palpations: Abdomen is soft. Tenderness: There is no abdominal tenderness. Musculoskeletal:         General: No swelling. Cervical back: Neck supple. Skin:     General: Skin is warm and dry. Capillary Refill: Capillary refill takes less than 2 seconds. Neurological:      Mental Status: He is alert and oriented to person, place, and time.    Psychiatric:         Mood and Affect: Mood normal.          Additional Data:     Labs:  Results from last 7 days   Lab Units 08/17/23  0505 08/16/23  1420   WBC Thousand/uL 4.78 6.18   HEMOGLOBIN g/dL 10.5* 12.2   HEMATOCRIT % 32.0* 38.4   PLATELETS Thousands/uL 146* 175   NEUTROS PCT %  --  70   LYMPHS PCT %  --  13*   MONOS PCT %  --  14*   EOS PCT %  --  2     Results from last 7 days   Lab Units 08/17/23  0505   SODIUM mmol/L 138   POTASSIUM mmol/L 4.0   CHLORIDE mmol/L 107   CO2 mmol/L 26   BUN mg/dL 30*   CREATININE mg/dL 1.59*   ANION GAP mmol/L 5   CALCIUM mg/dL 8.9   ALBUMIN g/dL 3.5   TOTAL BILIRUBIN mg/dL 0.52   ALK PHOS U/L 54   ALT U/L 7   AST U/L 10*   GLUCOSE RANDOM mg/dL 84                       Lines/Drains:  Invasive Devices     Peripheral Intravenous Line  Duration           Peripheral IV 08/16/23 Left;Proximal;Ventral (anterior) Forearm <1 day                      Imaging: Reviewed radiology reports from this admission including: CT spine     Recent Cultures (last 7 days):         Last 24 Hours Medication List:   Current Facility-Administered Medications   Medication Dose Route Frequency Provider Last Rate   • acetaminophen  650 mg Oral Q6H PRN Phil Hercules DO     • aspirin  81 mg Oral Daily Phil Hercules DO     • atorvastatin  40 mg Oral Daily With [employer], DO     • cyanocobalamin  100 mcg Oral Daily Phil Hercules, DO     • heparin (porcine)  5,000 Units Subcutaneous Q8H [address] Hilaria Mckenzie PA-C     • hydrALAZINE  10 mg Intravenous Q6H PRN Phil Hercules, DO     • HYDROmorphone  0.2 mg Intravenous Q4H PRN Phil Hercules, DO     • levothyroxine  88 mcg Oral Daily Phil Hercules, DO     • lidocaine  1 patch Topical Daily Phil Hercules, DO     • melatonin  3 mg Oral HS PRN Valencia Hollins PA-C     • metoprolol succinate  25 mg Oral Daily Phil Hercules, DO     • nicotine  1 patch Transdermal Daily Phil Hercules, DO     • nitroglycerin  0.4 mg Sublingual Q5 Min PRN Phil Hercules,      • ondansetron  4 mg Intravenous Q6H PRN Phil Hercules DO     • oxyCODONE  5 mg Oral Q4H PRN Phil Hercules DO     • oxyCODONE  2.5 mg Oral Q4H PRN Phil Hercules DO     • senna-docusate sodium  1 tablet Oral HS Phil Hercules DO     • sodium chloride  100 mL/hr Intravenous Continuous MORAIMA Estrada 100 mL/hr (08/17/23 1058)   • tamsulosin  0.4 mg Oral Daily With Dinner Eryn Man DO          Today, Patient Was Seen By: MORAIMA Estrada    **Please Note: This note may have been constructed using a voice recognition system. **

## 2023-08-17 NOTE — ASSESSMENT & PLAN NOTE
· DIL concerned due to progressive memory loss.  Getting lost driving since removed care from his resident as he was driving to [place] alone but other times lost.  · Will check TSH, B12, and folate   · No infection appreciate  · If work up negative should follow up with PCP for further work up   · Daniela. #5 Gia Villasenor Final PCP WITH FAMILY (0) independent

## 2023-08-17 NOTE — PLAN OF CARE
Problem: PAIN - ADULT  Goal: Verbalizes/displays adequate comfort level or baseline comfort level  Description: Interventions:  - Encourage patient to monitor pain and request assistance  - Assess pain using appropriate pain scale  - Administer analgesics based on type and severity of pain and evaluate response  - Implement non-pharmacological measures as appropriate and evaluate response  - Consider cultural and social influences on pain and pain management  - Notify physician/advanced practitioner if interventions unsuccessful or patient reports new pain  Outcome: Progressing     Problem: INFECTION - ADULT  Goal: Absence or prevention of progression during hospitalization  Description: INTERVENTIONS:  - Assess and monitor for signs and symptoms of infection  - Monitor lab/diagnostic results  - Monitor all insertion sites, i.e. indwelling lines, tubes, and drains  - Monitor endotracheal if appropriate and nasal secretions for changes in amount and color  - [place] appropriate cooling/warming therapies per order  - Administer medications as ordered  - Instruct and encourage patient and family to use good hand hygiene technique  - Identify and instruct in appropriate isolation precautions for identified infection/condition  Outcome: Progressing     Problem: SAFETY ADULT  Goal: Patient will remain free of falls  Description: INTERVENTIONS:  - Educate patient/family on patient safety including physical limitations  - Instruct patient to call for assistance with activity   - Consult OT/PT to assist with strengthening/mobility   - Keep Call bell within reach  - Keep bed low and locked with side rails adjusted as appropriate  - Keep care items and personal belongings within reach  - Initiate and maintain comfort rounds  - Make Fall Risk Sign visible to staff  - Offer Toileting every 2 Hours, in advance of need  - Initiate/Maintain bed alarm  - Obtain necessary fall risk management equipment: call bell within reach   - Apply yellow socks and bracelet for high fall risk patients  - Consider moving patient to room near nurses station  Outcome: Progressing     Problem: SAFETY ADULT  Goal: Maintains/Returns to pre admission functional level  Description: INTERVENTIONS:  - Perform BMAT or MOVE assessment daily.   - Set and communicate daily mobility goal to care team and patient/family/caregiver. - Collaborate with rehabilitation services on mobility goals if consulted  - Perform Range of Motion 3 times a day. - Reposition patient every 3 hours.   - Dangle patient 3 times a day  - Stand patient 3 times a day  - Ambulate patient 3 times a day  - Out of bed to chair 3 times a day   - Out of bed for meals 3 times a day  - Out of bed for toileting  - Record patient progress and toleration of activity level   Outcome: Progressing

## 2023-08-17 NOTE — ASSESSMENT & PLAN NOTE
• PT/OT Eval and treat recommending post acute rehab services  · Patient came in with reported back pain. · CT lumbar spine obtained: No acute compression collapse of the vertebra Multilevel degenerative disc disease with facet joint disease and multilevel foraminal narrowing Moderate right foraminal narrowing at L5-S1  · Conservative measure PT/OT eval pain management   · Responding to lidoderm and low dose oxy.   AM-PAC Basic Mobility:  Basic Mobility Inpatient Raw Score: 21    JH-HLM Achieved: 6: Walk 10 steps or more  -HLM Goal: 6: Walk 10 steps or more  • Encourage appropriate mobility to achieve and improve upon [facility] Goals as noted  • Eager for placement to rehab

## 2023-08-17 NOTE — ASSESSMENT & PLAN NOTE
Blood Pressure: 124/57    Plan:  • Medications held: Lisinopril  • Monitor blood pressure  • PRN IV Hydralazine for SBP > 160

## 2023-08-18 PROBLEM — B02.8 HERPES ZOSTER WITH COMPLICATION: Status: ACTIVE | Noted: 2023-08-18

## 2023-08-18 LAB
ANION GAP SERPL CALCULATED.3IONS-SCNC: 7 MMOL/L
BUN SERPL-MCNC: 25 MG/DL (ref 5–25)
CALCIUM SERPL-MCNC: 8.6 MG/DL (ref 8.4–10.2)
CHLORIDE SERPL-SCNC: 107 MMOL/L (ref 96–108)
CO2 SERPL-SCNC: 23 MMOL/L (ref 21–32)
CREAT SERPL-MCNC: 1.44 MG/DL (ref 0.6–1.3)
ERYTHROCYTE [DISTWIDTH] IN BLOOD BY AUTOMATED COUNT: 13.9 % (ref 11.6–15.1)
FOLATE SERPL-MCNC: 15.2 NG/ML
GFR SERPL CREATININE-BSD FRML MDRD: 41 ML/MIN/1.73SQ M
GLUCOSE SERPL-MCNC: 95 MG/DL (ref 65–140)
HCT VFR BLD AUTO: 36.3 % (ref 36.5–49.3)
HGB BLD-MCNC: 11.7 G/DL (ref 12–17)
MCH RBC QN AUTO: 31.7 PG (ref 26.8–34.3)
MCHC RBC AUTO-ENTMCNC: 32.2 G/DL (ref 31.4–37.4)
MCV RBC AUTO: 98 FL (ref 82–98)
PLATELET # BLD AUTO: 167 THOUSANDS/UL (ref 149–390)
PMV BLD AUTO: 10.1 FL (ref 8.9–12.7)
POTASSIUM SERPL-SCNC: 4.1 MMOL/L (ref 3.5–5.3)
RBC # BLD AUTO: 3.69 MILLION/UL (ref 3.88–5.62)
SODIUM SERPL-SCNC: 137 MMOL/L (ref 135–147)
T4 FREE SERPL-MCNC: 0.95 NG/DL (ref 0.61–1.12)
TSH SERPL DL<=0.05 MIU/L-ACNC: 16.34 UIU/ML (ref 0.45–4.5)
VIT B12 SERPL-MCNC: 709 PG/ML (ref 180–914)
WBC # BLD AUTO: 5.39 THOUSAND/UL (ref 4.31–10.16)

## 2023-08-18 PROCEDURE — 82607 VITAMIN B-12: CPT | Performed by: INTERNAL MEDICINE

## 2023-08-18 PROCEDURE — 82746 ASSAY OF FOLIC ACID SERUM: CPT | Performed by: INTERNAL MEDICINE

## 2023-08-18 PROCEDURE — 80048 BASIC METABOLIC PNL TOTAL CA: CPT | Performed by: INTERNAL MEDICINE

## 2023-08-18 PROCEDURE — 85027 COMPLETE CBC AUTOMATED: CPT | Performed by: INTERNAL MEDICINE

## 2023-08-18 PROCEDURE — 84439 ASSAY OF FREE THYROXINE: CPT | Performed by: INTERNAL MEDICINE

## 2023-08-18 PROCEDURE — 99233 SBSQ HOSP IP/OBS HIGH 50: CPT | Performed by: INTERNAL MEDICINE

## 2023-08-18 PROCEDURE — 84443 ASSAY THYROID STIM HORMONE: CPT | Performed by: INTERNAL MEDICINE

## 2023-08-18 RX ORDER — SODIUM CHLORIDE, SODIUM GLUCONATE, SODIUM ACETATE, POTASSIUM CHLORIDE, MAGNESIUM CHLORIDE, SODIUM PHOSPHATE, DIBASIC, AND POTASSIUM PHOSPHATE .53; .5; .37; .037; .03; .012; .00082 G/100ML; G/100ML; G/100ML; G/100ML; G/100ML; G/100ML; G/100ML
100 INJECTION, SOLUTION INTRAVENOUS CONTINUOUS
Status: DISCONTINUED | OUTPATIENT
Start: 2023-08-18 | End: 2023-08-18

## 2023-08-18 RX ORDER — SODIUM CHLORIDE 9 MG/ML
100 INJECTION, SOLUTION INTRAVENOUS CONTINUOUS
Status: DISCONTINUED | OUTPATIENT
Start: 2023-08-18 | End: 2023-08-19

## 2023-08-18 RX ORDER — VALACYCLOVIR HYDROCHLORIDE 500 MG/1
1000 TABLET, FILM COATED ORAL EVERY 24 HOURS
Status: DISCONTINUED | OUTPATIENT
Start: 2023-08-18 | End: 2023-08-20

## 2023-08-18 RX ADMIN — HEPARIN SODIUM 5000 UNITS: 5000 INJECTION INTRAVENOUS; SUBCUTANEOUS at 04:44

## 2023-08-18 RX ADMIN — OXYCODONE HYDROCHLORIDE 5 MG: 5 TABLET ORAL at 03:34

## 2023-08-18 RX ADMIN — HYDROMORPHONE HYDROCHLORIDE 0.2 MG: 0.2 INJECTION, SOLUTION INTRAMUSCULAR; INTRAVENOUS; SUBCUTANEOUS at 04:36

## 2023-08-18 RX ADMIN — SODIUM CHLORIDE 100 ML/HR: 0.9 INJECTION, SOLUTION INTRAVENOUS at 04:45

## 2023-08-18 RX ADMIN — METOPROLOL SUCCINATE 25 MG: 25 TABLET, EXTENDED RELEASE ORAL at 09:13

## 2023-08-18 RX ADMIN — OXYCODONE HYDROCHLORIDE 5 MG: 5 TABLET ORAL at 17:18

## 2023-08-18 RX ADMIN — LEVOTHYROXINE SODIUM 88 MCG: 88 TABLET ORAL at 04:45

## 2023-08-18 RX ADMIN — ATORVASTATIN CALCIUM 40 MG: 40 TABLET, FILM COATED ORAL at 17:18

## 2023-08-18 RX ADMIN — HEPARIN SODIUM 5000 UNITS: 5000 INJECTION INTRAVENOUS; SUBCUTANEOUS at 22:13

## 2023-08-18 RX ADMIN — VALACYCLOVIR HYDROCHLORIDE 1000 MG: 500 TABLET, FILM COATED ORAL at 13:31

## 2023-08-18 RX ADMIN — SODIUM CHLORIDE 100 ML/HR: 0.9 INJECTION, SOLUTION INTRAVENOUS at 15:03

## 2023-08-18 RX ADMIN — SODIUM CHLORIDE 100 ML/HR: 0.9 INJECTION, SOLUTION INTRAVENOUS at 22:18

## 2023-08-18 RX ADMIN — TAMSULOSIN HYDROCHLORIDE 0.4 MG: 0.4 CAPSULE ORAL at 17:18

## 2023-08-18 RX ADMIN — ASPIRIN 81 MG: 81 TABLET, COATED ORAL at 09:13

## 2023-08-18 RX ADMIN — HEPARIN SODIUM 5000 UNITS: 5000 INJECTION INTRAVENOUS; SUBCUTANEOUS at 13:31

## 2023-08-18 RX ADMIN — LIDOCAINE 5% 1 PATCH: 700 PATCH TOPICAL at 09:13

## 2023-08-18 RX ADMIN — VITAM B12 100 MCG: 100 TAB at 09:13

## 2023-08-18 RX ADMIN — SENNOSIDES AND DOCUSATE SODIUM 1 TABLET: 50; 8.6 TABLET ORAL at 22:12

## 2023-08-18 RX ADMIN — OXYCODONE HYDROCHLORIDE 5 MG: 5 TABLET ORAL at 22:12

## 2023-08-18 NOTE — ASSESSMENT & PLAN NOTE
· DIL concerned due to progressive memory loss.  Getting lost driving since removed care from his resident as he was driving to [place] alone but other times lost.    · No infection appreciate  · If work up negative should follow up with PCP for further work up   · Valente #5 Gia Villasenor Final PCP WITH FAMILY  · Neuropsych consult for capacity and cognitive eval

## 2023-08-18 NOTE — ASSESSMENT & PLAN NOTE
Lab Results   Component Value Date    [id number] 16.341 (H) 08/18/2023      · Continue home levothyroxine  · Follow up T4 results and may need to adjust levothyroxine dose

## 2023-08-18 NOTE — ASSESSMENT & PLAN NOTE
• PT/OT Eval and treat recommending post acute rehab services  · Patient came in with reported back pain. · CT lumbar spine obtained: No acute compression collapse of the vertebra Multilevel degenerative disc disease with facet joint disease and multilevel foraminal narrowing Moderate right foraminal narrowing at L5-S1  · Conservative measure PT/OT eval pain management   · Responding to lidoderm and low dose oxy.   AM-PAC Basic Mobility:  Basic Mobility Inpatient Raw Score: 21    JH-HLM Achieved: 7: Walk 25 feet or more  JH-HLM Goal: 6: Walk 10 steps or more  • Encourage appropriate mobility to achieve and improve upon [facility] Goals as noted  • Initially agreeable to rehab but now declining; obtain neuropsych consult to determine capacity

## 2023-08-18 NOTE — CONSULTS
Consultation - Neuropsychology/Psychology Department  Felicity Mcrae 80 y.o. male MRN: [id number]  Unit/Bed#: -01 Encounter: [id number]        Reason for Consultation:  Felicity Mcrae is a 80y.o. year old male who was referred for a Neuropsychological Exam to assess cognitive functioning and comment on capacity to make informed medical decisions.       History of Present Illness  Admitted with back pain    Physician Requesting Consult: Sandoval Graves DO    PROBLEM LIST:  Patient Active Problem List   Diagnosis   • Coronary artery disease involving native coronary artery of native heart without angina pectoris   • Primary hypertension   • Acquired hypothyroidism   • Mitral valve disorder   • Hyperlipemia, mixed   • History of prostate cancer   • Ambulatory dysfunction   • ROMAN (acute kidney injury) ([address])   • Memory loss         Historical Information   Past Medical History:   Diagnosis Date   • Arthritis    • Coronary artery disease    • Disease of thyroid gland    • Heart disease    • Hypertension      Past Surgical History:   Procedure Laterality Date   • CARDIAC SURGERY     • HERNIA REPAIR       Social History   Social History     Substance and Sexual Activity   Alcohol Use Yes    Comment: socially     Social History     Substance and Sexual Activity   Drug Use No     Social History     Tobacco Use   Smoking Status Some Days   • Types: Pipe   Smokeless Tobacco Never     Family History:   Family History   Problem Relation Age of Onset   • No Known Problems Mother    • No Known Problems Father        Meds/Allergies   current meds:   Current Facility-Administered Medications   Medication Dose Route Frequency   • acetaminophen (TYLENOL) tablet 650 mg  650 mg Oral Q6H PRN   • aspirin (ECOTRIN LOW STRENGTH) EC tablet 81 mg  81 mg Oral Daily   • atorvastatin (LIPITOR) tablet 40 mg  40 mg Oral Daily With Dinner   • cyanocobalamin (VITAMIN B-12) tablet 100 mcg  100 mcg Oral Daily   • heparin (porcine) subcutaneous injection 5,000 Units  5,000 Units Subcutaneous [facility]   • hydrALAZINE (APRESOLINE) injection 10 mg  10 mg Intravenous Q6H PRN   • HYDROmorphone HCl (DILAUDID) injection 0.2 mg  0.2 mg Intravenous Q4H PRN   • levothyroxine tablet 88 mcg  88 mcg Oral Daily   • lidocaine (LIDODERM) 5 % patch 1 patch  1 patch Topical Daily   • melatonin tablet 3 mg  3 mg Oral HS PRN   • metoprolol succinate (TOPROL-XL) 24 hr tablet 25 mg  25 mg Oral Daily   • multi-electrolyte (PLASMALYTE-A/ISOLYTE-S PH 7.4) IV solution  100 mL/hr Intravenous Continuous   • nicotine (NICODERM CQ) 21 mg/24 hr TD 24 hr patch 1 patch  1 patch Transdermal Daily   • nitroglycerin (NITROSTAT) SL tablet 0.4 mg  0.4 mg Sublingual Q5 Min PRN   • ondansetron (ZOFRAN) injection 4 mg  4 mg Intravenous Q6H PRN   • oxyCODONE (ROXICODONE) IR tablet 5 mg  5 mg Oral Q4H PRN   • oxyCODONE (ROXICODONE) split tablet 2.5 mg  2.5 mg Oral Q4H PRN   • senna-docusate sodium (SENOKOT S) 8.6-50 mg per tablet 1 tablet  1 tablet Oral HS   • sodium chloride 0.9 % infusion  100 mL/hr Intravenous Continuous   • tamsulosin (FLOMAX) capsule 0.4 mg  0.4 mg Oral Daily With Dinner       Allergies   Allergen Reactions   • Penicillins Swelling   • Keflex [Cephalexin]    • Prednisone Other (See Comments)     Pt states "i don't know, I break out"         Family and Social Support:   Living Arrangements: Lives Alone  Support Systems: Self; Son  Type of Current Residence: [facility]  Discharge planning discussed with[de-identified] Patient  Freedom of Choice: Yes      Behavioral Observations: Alert, oriented x 3, cooperative; denied depressed mood and anxiety; no overt evidence of psychotic process and patient was aware of reason for hospitalization but unable to describe medical history. States he requires continued medical treatment for back pain. Patient reported he is experiencing more difficulty with memory.      Cognitive Examination    General Cognitive Functioning MMSE = Adequate orientation, registration of information, working memory with a deficit in recall 1/3    Attention/Concentration Auditory Selective Attention = Average; Auditory Vigilance = Within Normal Limits; Information Processing Speed = Within Normal Limits    Frontal Systems/Executive Functioning Mental Flexibility/Cognitive Control = Average; Working Memory = Average Abstract Reasoning = Average;  Generative Ability = Impaired  Commonsense Reasoning and Judgement = Average    Language Functioning Phonemic Fluency = Impaired; Semantic Retrieval = Impaired; Comprehension of Complex Ideational Material = Within Normal Limits;     Memory Functioning Narrative Recall - Short Delay = Borderline/Low Average; Three word recall = 1/3    Visuo-Spatial Abilities Not Assessed    Functional Knowledge  Health & Safety Knowledge = Average;     Summary/Impression:  Results of Neuropsychological Exam revealed cognitive deficits in recall, generative ability and semantic retrieval with all other tested areas within normal limits. On a measure assessing awareness of personal health status and ability to evaluate health problems, handle medical emergencies and take safety precautions, patient performed within normal limits. During this encounter, patient appears to have capacity to make informed medical decisions.  Mild Neurocognitive Disorder

## 2023-08-18 NOTE — ASSESSMENT & PLAN NOTE
Recent Labs     08/16/23  1420 08/17/23  0505 08/18/23  0420   CREATININE 1.78* 1.59* 1.44*   EGFR 32 37 41     Estimated Creatinine Clearance: 28.5 mL/min (A) (by C-G formula based on SCr of 1.44 mg/dL (H)). • POA; Further chart reviewed revealed Cr.  Baseline likely closely to 1.3   • Likely prerenal leukopenia secondary to decreased oral intake    Plan:  • Urinary retention protocol, Bladder scan, Daily weights, I/O  • IV Fluids Plasma-Lyte at 100 mL/h  • Monitor BMP daily and observe for downward trend of creatinine  • Avoid hypoperfusion of the kidneys, minimize nephrotoxins  · RAAS Blocker held

## 2023-08-18 NOTE — PROGRESS NOTES
[address]  Progress Note  Name: Chary Laguerre  MRN: [id number]  Unit/Bed#: -01 I Date of Admission: 8/16/2023   Date of Service: 8/18/2023 I Hospital Day: 2    Assessment/Plan   * ROMAN (acute kidney injury) [facility])  Assessment & Plan  Recent Labs     08/16/23  1420 08/17/23  0505 08/18/23  0420   CREATININE 1.78* 1.59* 1.44*   EGFR 32 37 41     Estimated Creatinine Clearance: 28.5 mL/min (A) (by C-G formula based on SCr of 1.44 mg/dL (H)). • POA; Further chart reviewed revealed Cr. Baseline likely closely to 1.3   • Likely prerenal leukopenia secondary to decreased oral intake    Plan:  • Urinary retention protocol, Bladder scan, Daily weights, I/O  • IV Fluids Plasma-Lyte at 100 mL/h  • Monitor BMP daily and observe for downward trend of creatinine  • Avoid hypoperfusion of the kidneys, minimize nephrotoxins  · RAAS Blocker held    Herpes zoster with complication  Assessment & Plan  Rash noted on the right lower extremity and right low back  Photos uploaded to media by wound and nursing team    Valtrex 1000 mg qd - renal dosing    Memory loss  Assessment & Plan  · DIL concerned due to progressive memory loss. Getting lost driving since removed care from his resident as he was driving to [place] alone but other times lost.    · No infection appreciate  · If work up negative should follow up with PCP for further work up   · Valente #5 Gia Villasenor Final PCP WITH FAMILY  · Neuropsych consult for capacity and cognitive eval    Ambulatory dysfunction  Assessment & Plan  • PT/OT Eval and treat recommending post acute rehab services  · Patient came in with reported back pain.    · CT lumbar spine obtained: No acute compression collapse of the vertebra Multilevel degenerative disc disease with facet joint disease and multilevel foraminal narrowing Moderate right foraminal narrowing at L5-S1  · Conservative measure PT/OT eval pain management   · Responding to lidoderm and low dose oxy.  AM-PAC Basic Mobility:  Basic Mobility Inpatient Raw Score: 21    JH-HLM Achieved: 7: Walk 25 feet or more  JH-HLM Goal: 6: Walk 10 steps or more  • Encourage appropriate mobility to achieve and improve upon [facility] Goals as noted  • Initially agreeable to rehab but now declining; obtain neuropsych consult to determine capacity    Hyperlipemia, mixed  Assessment & Plan  · Continue Statin    Acquired hypothyroidism  Assessment & Plan  Lab Results   Component Value Date    [id number] 16.341 (H) 08/18/2023      · Continue home levothyroxine  · Follow up T4 results and may need to adjust levothyroxine dose    Primary hypertension  Assessment & Plan  Blood Pressure: 142/77    Plan:  • Medications held: Ramipril  • Monitor blood pressure  • PRN IV Hydralazine for SBP > 160      Coronary artery disease involving native coronary artery of native heart without angina pectoris  Assessment & Plan  · Continue beta-blocker; not in acute chest pain at this time             VTE Pharmacologic Prophylaxis: VTE Score: 3 Moderate Risk (Score 3-4) - Pharmacological DVT Prophylaxis Ordered: heparin. Patient Centered Rounds: I performed bedside rounds with nursing staff today. Discussions with Specialists or Other Care Team Provider:  IP CONSULT TO NEUROPSYCHOLOGY    Education and Discussions with Family / Patient: Patient and extensive discussion with son Justine Krabbe)    Time Spent for Care: 75 minutes. More than 50% of total time spent on counseling and coordination of care as described above. Current Length of Stay: 2 day(s)  Current Patient Status: Inpatient   Certification Statement: The patient will continue to require additional inpatient hospital stay due to plan noted above  Discharge Plan: Anticipate discharge in 24-48 hrs to rehab facility. Code Status: Level 1 - Full Code    Subjective:   Continues to complain of low back pain likely attributed to shingles.  Discussed extrensively with son who is agreeable to rehab and is aware of the shingles diagnosis that is new. All questions answered for patient and patient's son. Objective:     Vitals:   Temp (24hrs), Av.9 °F (36.6 °C), Min:97.4 °F (36.3 °C), Max:98.6 °F (37 °C)    Temp:  [97.4 °F (36.3 °C)-98.6 °F (37 °C)] 97.4 °F (36.3 °C)  HR:  [52-59] 59  Resp:  [15-22] 22  BP: (142-164)/(62-77) 142/77  SpO2:  [96 %-97 %] 96 %  Body mass index is 23.33 kg/m². Input and Output Summary (last 24 hours): Intake/Output Summary (Last 24 hours) at 2023 1303  Last data filed at 2023 1230  Gross per 24 hour   Intake 1120 ml   Output 125 ml   Net 995 ml       Physical Exam:   Physical Exam  Vitals and nursing note reviewed. Constitutional:       General: He is not in acute distress. Appearance: He is well-developed. He is ill-appearing (Chronically). He is not diaphoretic. HENT:      Head: Normocephalic and atraumatic. Nose: Nose normal.   Eyes:      General: No scleral icterus. Conjunctiva/sclera: Conjunctivae normal.      Pupils: Pupils are equal, round, and reactive to light. Neck:      Thyroid: No thyromegaly. Cardiovascular:      Rate and Rhythm: Normal rate and regular rhythm. Heart sounds: Normal heart sounds. No murmur heard. No friction rub. No gallop. Pulmonary:      Effort: Pulmonary effort is normal. No respiratory distress. Breath sounds: Normal breath sounds. No stridor. No wheezing or rales. Abdominal:      General: Bowel sounds are normal. There is no distension. Palpations: Abdomen is soft. There is no mass. Tenderness: There is no abdominal tenderness. Musculoskeletal:         General: Tenderness (Low back pain right side greater than left near the quadratus lumborum) present. No swelling or deformity. Cervical back: Neck supple. Skin:     General: Skin is warm and dry. Capillary Refill: Capillary refill takes less than 2 seconds. Findings: Lesion and rash present. Comments: See images in media   Neurological:      General: No focal deficit present. Mental Status: He is alert. Additional Data:     Labs:  Results from last 7 days   Lab Units 08/18/23  0420 08/17/23  0505 08/16/23  1420   WBC Thousand/uL 5.39   < > 6.18   HEMOGLOBIN g/dL 11.7*   < > 12.2   HEMATOCRIT % 36.3*   < > 38.4   PLATELETS Thousands/uL 167   < > 175   NEUTROS PCT %  --   --  70   LYMPHS PCT %  --   --  13*   MONOS PCT %  --   --  14*   EOS PCT %  --   --  2    < > = values in this interval not displayed. Results from last 7 days   Lab Units 08/18/23  0420 08/17/23  0505   SODIUM mmol/L 137 138   POTASSIUM mmol/L 4.1 4.0   CHLORIDE mmol/L 107 107   CO2 mmol/L 23 26   BUN mg/dL 25 30*   CREATININE mg/dL 1.44* 1.59*   ANION GAP mmol/L 7 5   CALCIUM mg/dL 8.6 8.9   ALBUMIN g/dL  --  3.5   TOTAL BILIRUBIN mg/dL  --  0.52   ALK PHOS U/L  --  54   ALT U/L  --  7   AST U/L  --  10*   GLUCOSE RANDOM mg/dL 95 84                       Lines/Drains:  Invasive Devices     Peripheral Intravenous Line  Duration           Peripheral IV 08/16/23 Left;Proximal;Ventral (anterior) Forearm 1 day                      Imaging: Reviewed radiology reports from this admission including: CT head    CT spine lumbar without contrast    Result Date: 8/16/2023  Impression: No acute compression collapse of the vertebra Multilevel degenerative disc disease with facet joint disease and multilevel foraminal narrowing Moderate right foraminal narrowing at L5-S1 Workstation performed: [id number]       No Chest XR results available for this patient.      Recent Cultures (last 7 days):         Last 24 Hours Medication List:   Current Facility-Administered Medications   Medication Dose Route Frequency Provider Last Rate   • acetaminophen  650 mg Oral Q6H PRN Phil Hercules DO     • aspirin  81 mg Oral Daily Phil Hercules DO     • atorvastatin  40 mg Oral Daily With [employer], DO     • cyanocobalamin 100 mcg Oral Daily [facility] Diomedes, DO     • heparin (porcine)  5,000 Units Subcutaneous Q8H [address] Hilaria Mckenzie PA-C     • hydrALAZINE  10 mg Intravenous Q6H PRN [facility] Diomedes, DO     • HYDROmorphone  0.2 mg Intravenous Q4H PRN [facility]     • levothyroxine  88 mcg Oral Daily [facility]     • lidocaine  1 patch Topical Daily [facility] Diomedes, DO     • melatonin  3 mg Oral HS PRN Chhaya Murrell PA-C     • metoprolol succinate  25 mg Oral Daily [facility] Drew[facility]helley, DO     • nicotine  1 patch Transdermal Daily [facility]     • nitroglycerin  0.4 mg Sublingual Q5 Min PRN [facility] Diomedes, DO     • ondansetron  4 mg Intravenous Q6H PRN [facility], DO     • oxyCODONE  5 mg Oral Q4H PRN [facility]     • oxyCODONE  2.5 mg Oral Q4H PRN [facility], DO     • senna-docusate sodium  1 tablet Oral HS [facility] Drew[facility]helley, DO     • sodium chloride  100 mL/hr Intravenous Continuous [facility] Diomedes,  mL/hr (08/18/23 1230)   • tamsulosin  0.4 mg Oral Daily With Dinner [facility] Diomedes, DO     • valACYclovir  1,000 mg Oral Q24H [facility] Diomedes, DO          Today, Patient Was Seen By: Di Pena DO    **Please Note: This note may have been constructed using a voice recognition system. **

## 2023-08-18 NOTE — DISCHARGE INSTR - OTHER ORDERS
Skin care plans:  1-Hydraguard to bilateral sacrum, buttock and heels BID and PRN  2-Elevate heels to offload pressure. 3-Ehob cushion in chair when out of bed. 4-Moisturize skin daily with skin nourishing cream.  5-Turn/reposition q2h for pressure re-distribution on skin. 6-R lower back, R hip and R pelvic region- Cleanse gently with soap and water, pat dry. Apply Xeroform over wound beds, and then Apply Allevyn foam dressing. Change every other day and as needed.

## 2023-08-18 NOTE — ASSESSMENT & PLAN NOTE
Rash noted on the right lower extremity and right low back  Photos uploaded to media by wound and nursing team    Valtrex 1000 mg qd - renal dosing

## 2023-08-18 NOTE — PLAN OF CARE
Problem: PAIN - ADULT  Goal: Verbalizes/displays adequate comfort level or baseline comfort level  Description: Interventions:  - Encourage patient to monitor pain and request assistance  - Assess pain using appropriate pain scale  - Administer analgesics based on type and severity of pain and evaluate response  - Implement non-pharmacological measures as appropriate and evaluate response  - Consider cultural and social influences on pain and pain management  - Notify physician/advanced practitioner if interventions unsuccessful or patient reports new pain  Outcome: Progressing     Problem: INFECTION - ADULT  Goal: Absence of fever/infection during neutropenic period  Description: INTERVENTIONS:  - Monitor WBC    Outcome: Progressing     Problem: SAFETY ADULT  Goal: Patient will remain free of falls  Description: INTERVENTIONS:  - Educate patient/family on patient safety including physical limitations  - Instruct patient to call for assistance with activity   - Consult OT/PT to assist with strengthening/mobility   - Keep Call bell within reach  - Keep bed low and locked with side rails adjusted as appropriate  - Keep care items and personal belongings within reach  - Initiate and maintain comfort rounds  - Make Fall Risk Sign visible to staff  - Offer Toileting every 2 Hours, in advance of need  - Initiate/Maintain bed alarm  - Obtain necessary fall risk management equipment: call bell within reach   - Apply yellow socks and bracelet for high fall risk patients  - Consider moving patient to room near nurses station  Outcome: Progressing     Problem: SAFETY ADULT  Goal: Maintains/Returns to pre admission functional level  Description: INTERVENTIONS:  - Perform BMAT or MOVE assessment daily.   - Set and communicate daily mobility goal to care team and patient/family/caregiver. - Collaborate with rehabilitation services on mobility goals if consulted  - Perform Range of Motion 3 times a day.   - Reposition patient every 3 hours.   - Dangle patient 3 times a day  - Stand patient 3 times a day  - Ambulate patient 3 times a day  - Out of bed to chair 3 times a day   - Out of bed for meals 3 times a day  - Out of bed for toileting  - Record patient progress and toleration of activity level   Outcome: Progressing

## 2023-08-18 NOTE — NURSING NOTE
This nurse was given report that pt had refused skin check upon admission. Pt refused skin check from this nurse on 8/17, upon applying lidocain patch, pt was found to have small brown discoloration on right lower back, no redness, noted. Upon shift this morning 8/17 pt continued to refuse skin check. Pt requested new lidocain patch and while applying patch this nurse noted a open area on pts belt area on right lower back. Wound consult made and wound nurses were close by to assist in skin check and assessment that pt now agreed to.

## 2023-08-18 NOTE — ASSESSMENT & PLAN NOTE
Blood Pressure: 142/77    Plan:  • Medications held: Ramipril  • Monitor blood pressure  • PRN IV Hydralazine for SBP > 160

## 2023-08-18 NOTE — WOUND OSTOMY CARE
Progress Note - Wound   Nik Brown 80 y.o. male MRN: [id number]  Unit/Bed#: -01 Encounter: [id number]      Assessment:   Patient admitted due to ROMAN. History of arthritis, CAD, HTN. Wound care consulted for right back wound. Patient agreeable to assessment, alert and oriented x3, continent of bowel and bladder, standby assist to stand for assessment at edge of bed, is an assist with care. Primary RN at bedside for assessment, SLIM made aware of assessment. Per primary RN- patient has been refusing skin assessment since admission and is now agreeable in order to be able to assess wounds. 1. Bilateral sacrum, buttock, hips, and elbows- skin is dry, intact, blanchable. 2. Patient refusing assessment of bilateral heels- states skin is dry and intact, no open wounds. 3. Right lower back, right hip, and right pelvic region- noted with scattered and irregular shapes fluid filled blisters with purple discoloration. Partial thickness skin breakdown for right lower back due to patient's pants and belt. Approx. 20% beefy red partial thickness tissue, 10% pink/erythematous skin, and 70% purple discolored fluid filled blisters- suspicious for shingles (SLIM aware). Ruth Ann-wounds are dry, intact, blanchable pink skin. Educated patient on importance of frequent offloading of pressure via turning, repositioning, and weight-shifting. Verbalized understanding of plan of care. No induration, fluctuance, odor, warmth, or purulence noted to the above noted wounds. New dressings applied. Patient tolerated well, reports moderate pain to the wounds. Primary nurse aware of plan of care. See flow sheets for more detailed assessment findings. Will follow along. Skin care plans:  1-Hydraguard to bilateral sacrum, buttock and heels BID and PRN  2-Elevate heels to offload pressure. 3-Ehob cushion in chair when out of bed.   4-Moisturize skin daily with skin nourishing cream.  5-Turn/reposition q2h for pressure re-distribution on skin. 6-R lower back, R hip and R pelvic region- Cleanse gently with soap and water, pat dry. Apply Xeroform over wound beds, and then Apply Allevyn foam dressing. Change every other day and as needed. Wound 08/18/23 Back Right; Lower (Active)   Wound Image   08/18/23 0919   Wound Description Light purple; Beefy red;Pink; Other (Comment) 08/18/23 0919   Ruth Ann-wound Assessment Dry; Intact 08/18/23 0919   Wound Length (cm) 13 cm 08/18/23 0919   Wound Width (cm) 21 cm 08/18/23 0919   Wound Depth (cm) 0.1 cm 08/18/23 0919   Wound Surface Area (cm^2) 273 cm^2 08/18/23 0919   Wound Volume (cm^3) 27.3 cm^3 08/18/23 0919   Calculated Wound Volume (cm^3) 27.3 cm^3 08/18/23 0919   Tunneling 0 cm 08/18/23 0919   Undermining 0 08/18/23 0919   Drainage Amount Scant 08/18/23 0919   Drainage Description Serosanguineous 08/18/23 0919   Non-staged Wound Description Partial thickness 08/18/23 0919   Treatments Cleansed;Site care 08/18/23 0919   Dressing Xeroform; Foam, Silicon (eg. Allevyn, etc) 08/18/23 0919   Wound packed? No 08/18/23 0919   Dressing Changed Changed 08/18/23 0919   Patient Tolerance Tolerated well 08/18/23 0919   Dressing Status Clean;Dry; Intact 08/18/23 0919       Wound 08/18/23 Pelvis Anterior;Right (Active)   Wound Image   08/18/23 0928   Wound Description Light purple;Pink; Other (Comment) 08/18/23 0928   Ruth Ann-wound Assessment Dry; Intact; Pink 08/18/23 0928   Wound Length (cm) 8 cm 08/18/23 0928   Wound Width (cm) 20 cm 08/18/23 0928   Wound Depth (cm) 0 cm 08/18/23 0928   Wound Surface Area (cm^2) 160 cm^2 08/18/23 0928   Wound Volume (cm^3) 0 cm^3 08/18/23 0928   Calculated Wound Volume (cm^3) 0 cm^3 08/18/23 0928   Tunneling 0 cm 08/18/23 0928   Undermining 0 08/18/23 0928   Drainage Amount None 08/18/23 0928   Non-staged Wound Description Not applicable 97/19/08 1903   Treatments Cleansed;Site care 08/18/23 0928   Dressing Xeroform; Foam, Silicon (eg.  Allevyn, etc) 08/18/23 0928   Wound packed? No 08/18/23 0928   Dressing Changed Changed 08/18/23 0928   Patient Tolerance Tolerated well 08/18/23 0928   Dressing Status Clean;Dry; Intact 08/18/23 0928       Call or tigertext with any questions  Wound Care will continue to follow    Db ANDREAN RN CWON  Wound and Ostomy care

## 2023-08-19 LAB
ALBUMIN SERPL BCP-MCNC: 3.9 G/DL (ref 3.5–5)
ALP SERPL-CCNC: 65 U/L (ref 34–104)
ALT SERPL W P-5'-P-CCNC: 4 U/L (ref 7–52)
ANION GAP SERPL CALCULATED.3IONS-SCNC: 7 MMOL/L
AST SERPL W P-5'-P-CCNC: 15 U/L (ref 13–39)
BASOPHILS # BLD AUTO: 0.02 THOUSANDS/ÂΜL (ref 0–0.1)
BASOPHILS NFR BLD AUTO: 0 % (ref 0–1)
BILIRUB SERPL-MCNC: 0.67 MG/DL (ref 0.2–1)
BUN SERPL-MCNC: 19 MG/DL (ref 5–25)
CALCIUM SERPL-MCNC: 8.7 MG/DL (ref 8.4–10.2)
CHLORIDE SERPL-SCNC: 107 MMOL/L (ref 96–108)
CO2 SERPL-SCNC: 20 MMOL/L (ref 21–32)
CREAT SERPL-MCNC: 1.14 MG/DL (ref 0.6–1.3)
EOSINOPHIL # BLD AUTO: 0.12 THOUSAND/ÂΜL (ref 0–0.61)
EOSINOPHIL NFR BLD AUTO: 2 % (ref 0–6)
ERYTHROCYTE [DISTWIDTH] IN BLOOD BY AUTOMATED COUNT: 13.8 % (ref 11.6–15.1)
GFR SERPL CREATININE-BSD FRML MDRD: 55 ML/MIN/1.73SQ M
GLUCOSE SERPL-MCNC: 95 MG/DL (ref 65–140)
HCT VFR BLD AUTO: 35.9 % (ref 36.5–49.3)
HGB BLD-MCNC: 11.8 G/DL (ref 12–17)
IMM GRANULOCYTES # BLD AUTO: 0.01 THOUSAND/UL (ref 0–0.2)
IMM GRANULOCYTES NFR BLD AUTO: 0 % (ref 0–2)
LYMPHOCYTES # BLD AUTO: 0.86 THOUSANDS/ÂΜL (ref 0.6–4.47)
LYMPHOCYTES NFR BLD AUTO: 14 % (ref 14–44)
MAGNESIUM SERPL-MCNC: 2.1 MG/DL (ref 1.9–2.7)
MCH RBC QN AUTO: 32.5 PG (ref 26.8–34.3)
MCHC RBC AUTO-ENTMCNC: 32.9 G/DL (ref 31.4–37.4)
MCV RBC AUTO: 99 FL (ref 82–98)
MONOCYTES # BLD AUTO: 1.04 THOUSAND/ÂΜL (ref 0.17–1.22)
MONOCYTES NFR BLD AUTO: 17 % (ref 4–12)
NEUTROPHILS # BLD AUTO: 4.16 THOUSANDS/ÂΜL (ref 1.85–7.62)
NEUTS SEG NFR BLD AUTO: 67 % (ref 43–75)
NRBC BLD AUTO-RTO: 0 /100 WBCS
PLATELET # BLD AUTO: 159 THOUSANDS/UL (ref 149–390)
PMV BLD AUTO: 10.2 FL (ref 8.9–12.7)
POTASSIUM SERPL-SCNC: 4.5 MMOL/L (ref 3.5–5.3)
PROT SERPL-MCNC: 6 G/DL (ref 6.4–8.4)
RBC # BLD AUTO: 3.63 MILLION/UL (ref 3.88–5.62)
SODIUM SERPL-SCNC: 134 MMOL/L (ref 135–147)
WBC # BLD AUTO: 6.21 THOUSAND/UL (ref 4.31–10.16)

## 2023-08-19 PROCEDURE — 80053 COMPREHEN METABOLIC PANEL: CPT | Performed by: INTERNAL MEDICINE

## 2023-08-19 PROCEDURE — 83735 ASSAY OF MAGNESIUM: CPT | Performed by: INTERNAL MEDICINE

## 2023-08-19 PROCEDURE — 99233 SBSQ HOSP IP/OBS HIGH 50: CPT | Performed by: INTERNAL MEDICINE

## 2023-08-19 PROCEDURE — 85025 COMPLETE CBC W/AUTO DIFF WBC: CPT | Performed by: INTERNAL MEDICINE

## 2023-08-19 RX ORDER — HYDRALAZINE HYDROCHLORIDE 20 MG/ML
10 INJECTION INTRAMUSCULAR; INTRAVENOUS EVERY 6 HOURS PRN
Status: DISCONTINUED | OUTPATIENT
Start: 2023-08-19 | End: 2023-08-23 | Stop reason: HOSPADM

## 2023-08-19 RX ORDER — LISINOPRIL 5 MG/1
5 TABLET ORAL DAILY
Status: DISCONTINUED | OUTPATIENT
Start: 2023-08-19 | End: 2023-08-21

## 2023-08-19 RX ORDER — LABETALOL HYDROCHLORIDE 5 MG/ML
10 INJECTION, SOLUTION INTRAVENOUS EVERY 6 HOURS PRN
Status: DISCONTINUED | OUTPATIENT
Start: 2023-08-19 | End: 2023-08-23 | Stop reason: HOSPADM

## 2023-08-19 RX ADMIN — METOPROLOL SUCCINATE 25 MG: 25 TABLET, EXTENDED RELEASE ORAL at 08:57

## 2023-08-19 RX ADMIN — TAMSULOSIN HYDROCHLORIDE 0.4 MG: 0.4 CAPSULE ORAL at 16:44

## 2023-08-19 RX ADMIN — HYDRALAZINE HYDROCHLORIDE 10 MG: 20 INJECTION INTRAMUSCULAR; INTRAVENOUS at 16:44

## 2023-08-19 RX ADMIN — HEPARIN SODIUM 5000 UNITS: 5000 INJECTION INTRAVENOUS; SUBCUTANEOUS at 21:55

## 2023-08-19 RX ADMIN — LISINOPRIL 5 MG: 5 TABLET ORAL at 11:21

## 2023-08-19 RX ADMIN — HYDROMORPHONE HYDROCHLORIDE 0.2 MG: 0.2 INJECTION, SOLUTION INTRAMUSCULAR; INTRAVENOUS; SUBCUTANEOUS at 04:14

## 2023-08-19 RX ADMIN — SODIUM CHLORIDE 100 ML/HR: 0.9 INJECTION, SOLUTION INTRAVENOUS at 08:59

## 2023-08-19 RX ADMIN — SENNOSIDES AND DOCUSATE SODIUM 1 TABLET: 50; 8.6 TABLET ORAL at 21:55

## 2023-08-19 RX ADMIN — HYDROMORPHONE HYDROCHLORIDE 0.2 MG: 0.2 INJECTION, SOLUTION INTRAMUSCULAR; INTRAVENOUS; SUBCUTANEOUS at 12:41

## 2023-08-19 RX ADMIN — HYDROMORPHONE HYDROCHLORIDE 0.2 MG: 0.2 INJECTION, SOLUTION INTRAMUSCULAR; INTRAVENOUS; SUBCUTANEOUS at 07:49

## 2023-08-19 RX ADMIN — ASPIRIN 81 MG: 81 TABLET, COATED ORAL at 08:57

## 2023-08-19 RX ADMIN — OXYCODONE HYDROCHLORIDE 5 MG: 5 TABLET ORAL at 21:55

## 2023-08-19 RX ADMIN — HEPARIN SODIUM 5000 UNITS: 5000 INJECTION INTRAVENOUS; SUBCUTANEOUS at 04:14

## 2023-08-19 RX ADMIN — LEVOTHYROXINE SODIUM 88 MCG: 88 TABLET ORAL at 04:14

## 2023-08-19 RX ADMIN — Medication 3 MG: at 21:55

## 2023-08-19 RX ADMIN — HEPARIN SODIUM 5000 UNITS: 5000 INJECTION INTRAVENOUS; SUBCUTANEOUS at 14:59

## 2023-08-19 RX ADMIN — VITAM B12 100 MCG: 100 TAB at 08:57

## 2023-08-19 RX ADMIN — HYDRALAZINE HYDROCHLORIDE 10 MG: 20 INJECTION INTRAMUSCULAR; INTRAVENOUS at 09:06

## 2023-08-19 RX ADMIN — VALACYCLOVIR HYDROCHLORIDE 1000 MG: 500 TABLET, FILM COATED ORAL at 12:51

## 2023-08-19 RX ADMIN — ATORVASTATIN CALCIUM 40 MG: 40 TABLET, FILM COATED ORAL at 16:44

## 2023-08-19 NOTE — PLAN OF CARE
Problem: PAIN - ADULT  Goal: Verbalizes/displays adequate comfort level or baseline comfort level  Description: Interventions:  - Encourage patient to monitor pain and request assistance  - Assess pain using appropriate pain scale  - Administer analgesics based on type and severity of pain and evaluate response  - Implement non-pharmacological measures as appropriate and evaluate response  - Consider cultural and social influences on pain and pain management  - Notify physician/advanced practitioner if interventions unsuccessful or patient reports new pain  Outcome: Progressing     Problem: INFECTION - ADULT  Goal: Absence or prevention of progression during hospitalization  Description: INTERVENTIONS:  - Assess and monitor for signs and symptoms of infection  - Monitor lab/diagnostic results  - Monitor all insertion sites, i.e. indwelling lines, tubes, and drains  - Monitor endotracheal if appropriate and nasal secretions for changes in amount and color  - [place] appropriate cooling/warming therapies per order  - Administer medications as ordered  - Instruct and encourage patient and family to use good hand hygiene technique  - Identify and instruct in appropriate isolation precautions for identified infection/condition  Outcome: Progressing  Goal: Absence of fever/infection during neutropenic period  Description: INTERVENTIONS:  - Monitor WBC    Outcome: Progressing     Problem: SAFETY ADULT  Goal: Patient will remain free of falls  Description: INTERVENTIONS:  - Educate patient/family on patient safety including physical limitations  - Instruct patient to call for assistance with activity   - Consult OT/PT to assist with strengthening/mobility   - Keep Call bell within reach  - Keep bed low and locked with side rails adjusted as appropriate  - Keep care items and personal belongings within reach  - Initiate and maintain comfort rounds  - Make Fall Risk Sign visible to staff  - Offer Toileting every  Hours, in advance of need  - Initiate/Maintain alarm  - Obtain necessary fall risk management equipment:   - Apply yellow socks and bracelet for high fall risk patients  - Consider moving patient to room near nurses station  Outcome: Progressing  Goal: Maintain or return to baseline ADL function  Description: INTERVENTIONS:  -  Assess patient's ability to carry out ADLs; assess patient's baseline for ADL function and identify physical deficits which impact ability to perform ADLs (bathing, care of mouth/teeth, toileting, grooming, dressing, etc.)  - Assess/evaluate cause of self-care deficits   - Assess range of motion  - Assess patient's mobility; develop plan if impaired  - Assess patient's need for assistive devices and provide as appropriate  - Encourage maximum independence but intervene and supervise when necessary  - Involve family in performance of ADLs  - Assess for home care needs following discharge   - Consider OT consult to assist with ADL evaluation and planning for discharge  - Provide patient education as appropriate  Outcome: Progressing  Goal: Maintains/Returns to pre admission functional level  Description: INTERVENTIONS:  - Perform BMAT or MOVE assessment daily.   - Set and communicate daily mobility goal to care team and patient/family/caregiver. - Collaborate with rehabilitation services on mobility goals if consulted  - Perform Range of Motion  times a day. - Reposition patient every  hours.   - Dangle patient  times a day  - Stand patient  times a day  - Ambulate patient  times a day  - Out of bed to chair  times a day   - Out of bed for meals  times a day  - Out of bed for toileting  - Record patient progress and toleration of activity level   Outcome: Progressing     Problem: DISCHARGE PLANNING  Goal: Discharge to home or other facility with appropriate resources  Description: INTERVENTIONS:  - Identify barriers to discharge w/patient and caregiver  - Arrange for needed discharge resources and transportation as appropriate  - Identify discharge learning needs (meds, wound care, etc.)  - Arrange for interpretive services to assist at discharge as needed  - Refer to Case Management Department for coordinating discharge planning if the patient needs post-hospital services based on physician/advanced practitioner order or complex needs related to functional status, cognitive ability, or social support system  Outcome: Progressing     Problem: Knowledge Deficit  Goal: Patient/family/caregiver demonstrates understanding of disease process, treatment plan, medications, and discharge instructions  Description: Complete learning assessment and assess knowledge base. Interventions:  - Provide teaching at level of understanding  - Provide teaching via preferred learning methods  Outcome: Progressing     Problem: MOBILITY - ADULT  Goal: Maintain or return to baseline ADL function  Description: INTERVENTIONS:  -  Assess patient's ability to carry out ADLs; assess patient's baseline for ADL function and identify physical deficits which impact ability to perform ADLs (bathing, care of mouth/teeth, toileting, grooming, dressing, etc.)  - Assess/evaluate cause of self-care deficits   - Assess range of motion  - Assess patient's mobility; develop plan if impaired  - Assess patient's need for assistive devices and provide as appropriate  - Encourage maximum independence but intervene and supervise when necessary  - Involve family in performance of ADLs  - Assess for home care needs following discharge   - Consider OT consult to assist with ADL evaluation and planning for discharge  - Provide patient education as appropriate  Outcome: Progressing  Goal: Maintains/Returns to pre admission functional level  Description: INTERVENTIONS:  - Perform BMAT or MOVE assessment daily.   - Set and communicate daily mobility goal to care team and patient/family/caregiver.    - Collaborate with rehabilitation services on mobility goals if consulted  - Perform Range of Motion  times a day. - Reposition patient every  hours.   - Dangle patient  times a day  - Stand patient  times a day  - Ambulate patient  times a day  - Out of bed to chair  times a day   - Out of bed for meals times a day  - Out of bed for toileting  - Record patient progress and toleration of activity level   Outcome: Progressing     Problem: Prexisting or High Potential for Compromised Skin Integrity  Goal: Skin integrity is maintained or improved  Description: INTERVENTIONS:  - Identify patients at risk for skin breakdown  - Assess and monitor skin integrity  - Assess and monitor nutrition and hydration status  - Monitor labs   - Assess for incontinence   - Turn and reposition patient  - Assist with mobility/ambulation  - Relieve pressure over bony prominences  - Avoid friction and shearing  - Provide appropriate hygiene as needed including keeping skin clean and dry  - Evaluate need for skin moisturizer/barrier cream  - Collaborate with interdisciplinary team   - Patient/family teaching  - Consider wound care consult   Outcome: Progressing

## 2023-08-19 NOTE — ASSESSMENT & PLAN NOTE
Recent Labs     08/17/23  0505 08/18/23  0420 08/19/23  0413   CREATININE 1.59* 1.44* 1.14   EGFR 37 41 55     Estimated Creatinine Clearance: 36 mL/min (by C-G formula based on SCr of 1.14 mg/dL). • POA; Further chart reviewed revealed Cr.  Baseline likely closely to 1.3   • Likely prerenal leukopenia secondary to decreased oral intake    Plan:  • Monitor BMP daily and observe for downward trend of creatinine  • Avoid hypoperfusion of the kidneys, minimize nephrotoxins  · Resume home meds, previously held due to ROMAN

## 2023-08-19 NOTE — PROGRESS NOTES
[address]  Progress Note  Name: Huang Zamora  MRN: [id number]  Unit/Bed#: -01 I Date of Admission: 8/16/2023   Date of Service: 8/19/2023 I Hospital Day: 3    Assessment/Plan   * ROMAN (acute kidney injury) [facility])  Assessment & Plan  Recent Labs     08/17/23  0505 08/18/23  0420 08/19/23  0413   CREATININE 1.59* 1.44* 1.14   EGFR 37 41 55     Estimated Creatinine Clearance: 36 mL/min (by C-G formula based on SCr of 1.14 mg/dL). • POA; Further chart reviewed revealed Cr. Baseline likely closely to 1.3   • Likely prerenal leukopenia secondary to decreased oral intake    Plan:  • Monitor BMP daily and observe for downward trend of creatinine  • Avoid hypoperfusion of the kidneys, minimize nephrotoxins  · Resume home meds, previously held due to ROMAN    Herpes zoster with complication  Assessment & Plan  Rash noted on the right lower extremity and right low back  Photos uploaded to media by wound and nursing team    Valtrex 1000 mg qd - renal dosing    Memory loss  Assessment & Plan  · DIL concerned due to progressive memory loss. Getting lost driving since removed care from his resident as he was driving to [place] alone but other times lost.    · No infection appreciate  · If work up negative should follow up with PCP for further work up   · Valente #5 Gia Villasenor Final PCP WITH FAMILY  · Neuropsych consult for capacity and cognitive eval  · Patient has Capacity; mild cognitive impairment    Ambulatory dysfunction  Assessment & Plan  • PT/OT Eval and treat recommending post acute rehab services-patient agreeable to placement; would ideally prefer rehab facility in [place]  •   · Patient came in with reported back pain.    · CT lumbar spine obtained: No acute compression collapse of the vertebra Multilevel degenerative disc disease with facet joint disease and multilevel foraminal narrowing Moderate right foraminal narrowing at L5-S1  · Conservative measure PT/OT eval pain management   · Responding to lidoderm and low dose oxy. AM-PAC Basic Mobility:  Basic Mobility Inpatient Raw Score: 18    JH-HLM Achieved: 3: Sit at edge of bed  -HL Goal: 6: Walk 10 steps or more  • Encourage appropriate mobility to achieve and improve upon [facility] Goals as noted    Hyperlipemia, mixed  Assessment & Plan  · Continue Statin    Acquired hypothyroidism  Assessment & Plan  Lab Results   Component Value Date    [id number] 16.341 (H) 08/18/2023    FREET4 0.95 08/18/2023      · Continue home levothyroxine  · Follow up T4 results and may need to adjust levothyroxine dose    Primary hypertension  Assessment & Plan  Blood Pressure: (!) 176/92  Pulse: 76  Plan:  • Resume home med  • Monitor blood pressure  • PRN IV Hydralazine and Labetalol for SBP > 160      Coronary artery disease involving native coronary artery of native heart without angina pectoris  Assessment & Plan  · Continue beta-blocker; not in acute chest pain at this time           VTE Pharmacologic Prophylaxis: VTE Score: 3 Moderate Risk (Score 3-4) - Pharmacological DVT Prophylaxis Ordered: heparin. Patient Centered Rounds: I performed bedside rounds with nursing staff today. Discussions with Specialists or Other Care Team Provider:  IP CONSULT TO NEUROPSYCHOLOGY    Education and Discussions with Family / Patient: Patient and son over the phone [place]    Time Spent for Care: 65 minutes. More than 50% of total time spent on counseling and coordination of care as described above. Current Length of Stay: 3 day(s)  Current Patient Status: Inpatient   Certification Statement: The patient will continue to require additional inpatient hospital stay due to plan noted above  Discharge Plan: Anticipate discharge in 24-48 hrs to rehab facility. Code Status: Level 1 - Full Code    Subjective:   Patient offers no new complaints at this time. Understanding of plan for placement to rehab facility in [facility].   Discussed also with son over the phone. All questions answered. Objective:     Vitals:   Temp (24hrs), Av.4 °F (36.9 °C), Min:98 °F (36.7 °C), Max:98.7 °F (37.1 °C)    Temp:  [98 °F (36.7 °C)-98.7 °F (37.1 °C)] 98 °F (36.7 °C)  HR:  [55-76] 76  Resp:  [15-20] 19  BP: (154-179)/(68-96) 176/92  SpO2:  [93 %-97 %] 97 %  Body mass index is 23.19 kg/m². Input and Output Summary (last 24 hours): Intake/Output Summary (Last 24 hours) at 2023 1618  Last data filed at 2023 0859  Gross per 24 hour   Intake 3773.33 ml   Output 650 ml   Net 3123.33 ml       Physical Exam:   Physical Exam  Vitals and nursing note reviewed. Constitutional:       General: He is not in acute distress. Appearance: He is well-developed. He is ill-appearing (Chronically). He is not diaphoretic. HENT:      Head: Normocephalic and atraumatic. Nose: Nose normal.   Eyes:      General: No scleral icterus. Conjunctiva/sclera: Conjunctivae normal.      Pupils: Pupils are equal, round, and reactive to light. Neck:      Thyroid: No thyromegaly. Cardiovascular:      Rate and Rhythm: Normal rate and regular rhythm. Heart sounds: Normal heart sounds. No murmur heard. No friction rub. No gallop. Pulmonary:      Effort: Pulmonary effort is normal. No respiratory distress. Breath sounds: Normal breath sounds. No stridor. No wheezing or rales. Abdominal:      General: Bowel sounds are normal. There is no distension. Palpations: Abdomen is soft. There is no mass. Tenderness: There is no abdominal tenderness. Musculoskeletal:         General: No swelling or deformity. Cervical back: Neck supple. Skin:     General: Skin is warm and dry. Capillary Refill: Capillary refill takes less than 2 seconds. Findings: Lesion and rash present. Comments: See images in media   Neurological:      General: No focal deficit present. Mental Status: He is alert.           Additional Data:     Labs:  Results from last 7 days   Lab Units 08/19/23  0413   WBC Thousand/uL 6.21   HEMOGLOBIN g/dL 11.8*   HEMATOCRIT % 35.9*   PLATELETS Thousands/uL 159   NEUTROS PCT % 67   LYMPHS PCT % 14   MONOS PCT % 17*   EOS PCT % 2     Results from last 7 days   Lab Units 08/19/23  0413   SODIUM mmol/L 134*   POTASSIUM mmol/L 4.5   CHLORIDE mmol/L 107   CO2 mmol/L 20*   BUN mg/dL 19   CREATININE mg/dL 1.14   ANION GAP mmol/L 7   CALCIUM mg/dL 8.7   ALBUMIN g/dL 3.9   TOTAL BILIRUBIN mg/dL 0.67   ALK PHOS U/L 65   ALT U/L 4*   AST U/L 15   GLUCOSE RANDOM mg/dL 95                       Lines/Drains:  Invasive Devices     Peripheral Intravenous Line  Duration           Peripheral IV 08/16/23 Left;Proximal;Ventral (anterior) Forearm 3 days                      Imaging: Reviewed radiology reports from this admission including: procedure reports    CT spine lumbar without contrast    Result Date: 8/16/2023  Impression: No acute compression collapse of the vertebra Multilevel degenerative disc disease with facet joint disease and multilevel foraminal narrowing Moderate right foraminal narrowing at L5-S1 Workstation performed: [id number]       No Chest XR results available for this patient.      Recent Cultures (last 7 days):         Last 24 Hours Medication List:   Current Facility-Administered Medications   Medication Dose Route Frequency Provider Last Rate   • acetaminophen  650 mg Oral Q6H PRN Phil Hercules, DO     • aspirin  81 mg Oral Daily Phil Hercules, DO     • atorvastatin  40 mg Oral Daily With [employer], DO     • cyanocobalamin  100 mcg Oral Daily Phil Hercules DO     • heparin (porcine)  5,000 Units Subcutaneous Q8H [address] Hilaria Mckenzie PA-C     • hydrALAZINE  10 mg Intravenous Q6H PRN Phil Hercules, DO     • HYDROmorphone  0.2 mg Intravenous Q4H PRN Phil Hercules, DO     • labetalol  10 mg Intravenous Q6H PRN Phil Hercules, DO     • levothyroxine  88 mcg Oral Daily Phil Hercules, DO     • lidocaine  1 patch Topical Daily [facility]     • lisinopril  5 mg Oral Daily Count includes [facility]     • melatonin  3 mg Oral HS PRN Jason Ca PA-C     • metoprolol succinate  25 mg Oral Daily Count includes the [facility]     • nicotine  1 patch Transdermal Daily Count includes the [facility]     • nitroglycerin  0.4 mg Sublingual Q5 Min PRN Count includes the [facility]     • ondansetron  4 mg Intravenous Q6H PRN Count includes the [facility]     • oxyCODONE  5 mg Oral Q4H PRN Count includes the [facility]     • oxyCODONE  2.5 mg Oral Q4H PRN Count includes the [facility]     • senna-docusate sodium  1 tablet Oral HS Count includes the [facility]     • tamsulosin  0.4 mg Oral Daily With [employer], DO     • valACYclovir  1,000 mg Oral Q24H [facility], [facility]          Today, Patient Was Seen By: Dave Bai DO    **Please Note: This note may have been constructed using a voice recognition system. **

## 2023-08-19 NOTE — ASSESSMENT & PLAN NOTE
• PT/OT Eval and treat recommending post acute rehab services-patient agreeable to placement; would ideally prefer rehab facility in [place]  •   · Patient came in with reported back pain. · CT lumbar spine obtained: No acute compression collapse of the vertebra Multilevel degenerative disc disease with facet joint disease and multilevel foraminal narrowing Moderate right foraminal narrowing at L5-S1  · Conservative measure PT/OT eval pain management   · Responding to lidoderm and low dose oxy.   AM-PAC Basic Mobility:  Basic Mobility Inpatient Raw Score: 18    -[facility] Achieved: 3: Sit at edge of bed  -[facility] Goal: 6: Walk 10 steps or more  • Encourage appropriate mobility to achieve and improve upon [facility] Goals as noted

## 2023-08-19 NOTE — ASSESSMENT & PLAN NOTE
Lab Results   Component Value Date    [id number] 16.341 (H) 08/18/2023    FREET4 0.95 08/18/2023      · Continue home levothyroxine  · Follow up T4 results and may need to adjust levothyroxine dose

## 2023-08-19 NOTE — ASSESSMENT & PLAN NOTE
· DIL concerned due to progressive memory loss.  Getting lost driving since removed care from his resident as he was driving to [place] alone but other times lost.    · No infection appreciate  · If work up negative should follow up with PCP for further work up   · Valente #5 Gia Villasenor Final PCP WITH FAMILY  · Neuropsych consult for capacity and cognitive eval  · Patient has Capacity; mild cognitive impairment

## 2023-08-19 NOTE — PLAN OF CARE
Problem: PAIN - ADULT  Goal: Verbalizes/displays adequate comfort level or baseline comfort level  Description: Interventions:  - Encourage patient to monitor pain and request assistance  - Assess pain using appropriate pain scale  - Administer analgesics based on type and severity of pain and evaluate response  - Implement non-pharmacological measures as appropriate and evaluate response  - Consider cultural and social influences on pain and pain management  - Notify physician/advanced practitioner if interventions unsuccessful or patient reports new pain  Outcome: Progressing     Problem: INFECTION - ADULT  Goal: Absence or prevention of progression during hospitalization  Description: INTERVENTIONS:  - Assess and monitor for signs and symptoms of infection  - Monitor lab/diagnostic results  - Monitor all insertion sites, i.e. indwelling lines, tubes, and drains  - Monitor endotracheal if appropriate and nasal secretions for changes in amount and color  - [place] appropriate cooling/warming therapies per order  - Administer medications as ordered  - Instruct and encourage patient and family to use good hand hygiene technique  - Identify and instruct in appropriate isolation precautions for identified infection/condition  Outcome: Progressing     Problem: MOBILITY - ADULT  Goal: Maintain or return to baseline ADL function  Description: INTERVENTIONS:  -  Assess patient's ability to carry out ADLs; assess patient's baseline for ADL function and identify physical deficits which impact ability to perform ADLs (bathing, care of mouth/teeth, toileting, grooming, dressing, etc.)  - Assess/evaluate cause of self-care deficits   - Assess range of motion  - Assess patient's mobility; develop plan if impaired  - Assess patient's need for assistive devices and provide as appropriate  - Encourage maximum independence but intervene and supervise when necessary  - Involve family in performance of ADLs  - Assess for home care needs following discharge   - Consider OT consult to assist with ADL evaluation and planning for discharge  - Provide patient education as appropriate  Outcome: Progressing

## 2023-08-19 NOTE — ASSESSMENT & PLAN NOTE
Blood Pressure: (!) 176/92  Pulse: 76  Plan:  • Resume home med  • Monitor blood pressure  • PRN IV Hydralazine and Labetalol for SBP > 160

## 2023-08-20 PROCEDURE — 99233 SBSQ HOSP IP/OBS HIGH 50: CPT | Performed by: INTERNAL MEDICINE

## 2023-08-20 RX ORDER — VALACYCLOVIR HYDROCHLORIDE 500 MG/1
1000 TABLET, FILM COATED ORAL EVERY 12 HOURS SCHEDULED
Status: DISCONTINUED | OUTPATIENT
Start: 2023-08-20 | End: 2023-08-23 | Stop reason: HOSPADM

## 2023-08-20 RX ADMIN — HEPARIN SODIUM 5000 UNITS: 5000 INJECTION INTRAVENOUS; SUBCUTANEOUS at 14:02

## 2023-08-20 RX ADMIN — LISINOPRIL 5 MG: 5 TABLET ORAL at 10:12

## 2023-08-20 RX ADMIN — HEPARIN SODIUM 5000 UNITS: 5000 INJECTION INTRAVENOUS; SUBCUTANEOUS at 05:34

## 2023-08-20 RX ADMIN — LEVOTHYROXINE SODIUM 88 MCG: 88 TABLET ORAL at 05:33

## 2023-08-20 RX ADMIN — ATORVASTATIN CALCIUM 40 MG: 40 TABLET, FILM COATED ORAL at 16:53

## 2023-08-20 RX ADMIN — HEPARIN SODIUM 5000 UNITS: 5000 INJECTION INTRAVENOUS; SUBCUTANEOUS at 23:38

## 2023-08-20 RX ADMIN — HYDROMORPHONE HYDROCHLORIDE 0.2 MG: 0.2 INJECTION, SOLUTION INTRAMUSCULAR; INTRAVENOUS; SUBCUTANEOUS at 13:56

## 2023-08-20 RX ADMIN — Medication 3 MG: at 23:38

## 2023-08-20 RX ADMIN — OXYCODONE HYDROCHLORIDE 5 MG: 5 TABLET ORAL at 23:38

## 2023-08-20 RX ADMIN — ASPIRIN 81 MG: 81 TABLET, COATED ORAL at 10:12

## 2023-08-20 RX ADMIN — METOPROLOL SUCCINATE 25 MG: 25 TABLET, EXTENDED RELEASE ORAL at 10:12

## 2023-08-20 RX ADMIN — VALACYCLOVIR HYDROCHLORIDE 1000 MG: 500 TABLET, FILM COATED ORAL at 14:02

## 2023-08-20 RX ADMIN — OXYCODONE HYDROCHLORIDE 5 MG: 5 TABLET ORAL at 16:53

## 2023-08-20 RX ADMIN — OXYCODONE HYDROCHLORIDE 5 MG: 5 TABLET ORAL at 05:33

## 2023-08-20 RX ADMIN — TAMSULOSIN HYDROCHLORIDE 0.4 MG: 0.4 CAPSULE ORAL at 16:53

## 2023-08-20 RX ADMIN — OXYCODONE HYDROCHLORIDE 5 MG: 5 TABLET ORAL at 10:12

## 2023-08-20 RX ADMIN — VITAM B12 100 MCG: 100 TAB at 10:12

## 2023-08-20 NOTE — ASSESSMENT & PLAN NOTE
Rash noted on the right lower extremity and right low back  Photos uploaded to media by wound and nursing team    Valtrex 1000 mg BID - renal dosing

## 2023-08-20 NOTE — PLAN OF CARE
Problem: INFECTION - ADULT  Goal: Absence or prevention of progression during hospitalization  Description: INTERVENTIONS:  - Assess and monitor for signs and symptoms of infection  - Monitor lab/diagnostic results  - Monitor all insertion sites, i.e. indwelling lines, tubes, and drains  - Monitor endotracheal if appropriate and nasal secretions for changes in amount and color  - [place] appropriate cooling/warming therapies per order  - Administer medications as ordered  - Instruct and encourage patient and family to use good hand hygiene technique  - Identify and instruct in appropriate isolation precautions for identified infection/condition  Outcome: Progressing     Problem: INFECTION - ADULT  Goal: Absence of fever/infection during neutropenic period  Description: INTERVENTIONS:  - Monitor WBC    Outcome: Progressing     Problem: Knowledge Deficit  Goal: Patient/family/caregiver demonstrates understanding of disease process, treatment plan, medications, and discharge instructions  Description: Complete learning assessment and assess knowledge base.   Interventions:  - Provide teaching at level of understanding  - Provide teaching via preferred learning methods  Outcome: Progressing

## 2023-08-20 NOTE — ASSESSMENT & PLAN NOTE
Recent Labs     08/18/23  0420 08/19/23  0413   CREATININE 1.44* 1.14   EGFR 41 55     Estimated Creatinine Clearance: 36 mL/min (by C-G formula based on SCr of 1.14 mg/dL). • POA; Further chart reviewed revealed Cr.  Baseline likely closely to 1.3   • Likely prerenal leukopenia secondary to decreased oral intake    Plan:  • Monitor BMP daily and observe for downward trend of creatinine  • Avoid hypoperfusion of the kidneys, minimize nephrotoxins  · Resume home meds, previously held due to ROMAN - adjust dosing of valtrex given improved CrCl

## 2023-08-20 NOTE — PROGRESS NOTES
[address]  Progress Note  Name: Amanad Cohen  MRN: [id number]  Unit/Bed#: -01 I Date of Admission: 8/16/2023   Date of Service: 8/20/2023 I Hospital Day: 4    Assessment/Plan   * ROMAN (acute kidney injury) [facility])  Assessment & Plan  Recent Labs     08/18/23  0420 08/19/23  0413   CREATININE 1.44* 1.14   EGFR 41 55     Estimated Creatinine Clearance: 36 mL/min (by C-G formula based on SCr of 1.14 mg/dL). • POA; Further chart reviewed revealed Cr. Baseline likely closely to 1.3   • Likely prerenal leukopenia secondary to decreased oral intake    Plan:  • Monitor BMP daily and observe for downward trend of creatinine  • Avoid hypoperfusion of the kidneys, minimize nephrotoxins  · Resume home meds, previously held due to ROMAN - adjust dosing of valtrex given improved CrCl    Herpes zoster with complication  Assessment & Plan  Rash noted on the right lower extremity and right low back  Photos uploaded to media by wound and nursing team    Valtrex 1000 mg BID - renal dosing    Memory loss  Assessment & Plan  · DIL concerned due to progressive memory loss. Getting lost driving since removed care from his resident as he was driving to [place] alone but other times lost.      · If work up negative should follow up with PCP for further work up   · [place] #5 Gia Villasenor Final PCP WITH FAMILY  · Neuropsych consult for capacity and cognitive eval  · Patient has Capacity; mild cognitive impairment    Ambulatory dysfunction  Assessment & Plan  • PT/OT Eval and treat recommending post acute rehab services-patient agreeable to placement; would ideally prefer rehab facility in [place]  •   · Patient came in with reported back pain.    · CT lumbar spine obtained: No acute compression collapse of the vertebra Multilevel degenerative disc disease with facet joint disease and multilevel foraminal narrowing Moderate right foraminal narrowing at L5-S1  · Conservative measure PT/OT eval pain management   · Responding to lidoderm and low dose oxy. AM-PAC Basic Mobility:  Basic Mobility Inpatient Raw Score: 16    JH-HLM Achieved: 6: Walk 10 steps or more  JH-HLM Goal: 5: Stand one or more mins  • Encourage appropriate mobility to achieve and improve upon [facility] Goals as noted    Hyperlipemia, mixed  Assessment & Plan  · Continue Statin    Acquired hypothyroidism  Assessment & Plan  Lab Results   Component Value Date    UYY2DTTRULCH 16.341 (H) 08/18/2023    FREET4 0.95 08/18/2023      · Continue home levothyroxine  · Follow up T4 results and may need to adjust levothyroxine dose    Primary hypertension  Assessment & Plan  Blood Pressure: 164/72    Plan:  • Resume home med  • Monitor blood pressure  • PRN IV Hydralazine and Labetalol for SBP > 160      Coronary artery disease involving native coronary artery of native heart without angina pectoris  Assessment & Plan  · Continue beta-blocker; not in acute chest pain at this time             VTE Pharmacologic Prophylaxis: VTE Score: 3 Moderate Risk (Score 3-4) - Pharmacological DVT Prophylaxis Ordered: heparin. Patient Centered Rounds: I performed bedside rounds with nursing staff today. Discussions with Specialists or Other Care Team Provider:  IP CONSULT TO NEUROPSYCHOLOGY    Education and Discussions with Family / Patient: patient    Time Spent for Care: 75 minutes. More than 50% of total time spent on counseling and coordination of care as described above. Current Length of Stay: 4 day(s)  Current Patient Status: Inpatient   Certification Statement: The patient will continue to require additional inpatient hospital stay due to plan noted above  Discharge Plan: Anticipate discharge in 24-48 hrs to rehab facility. in [place]    Code Status: Level 1 - Full Code    Subjective:   Continues to complain of pain at this time. No acute events reported overnight. Understanding of plan. All questions answered.     Objective:     Vitals:   Temp (24hrs), Av.2 °F (36.8 °C), Min:97.6 °F (36.4 °C), Max:98.6 °F (37 °C)    Temp:  [97.6 °F (36.4 °C)-98.6 °F (37 °C)] 98.6 °F (37 °C)  HR:  [58-88] 58  Resp:  [18-19] 19  BP: (156-164)/(72-81) 164/72  SpO2:  [96 %-97 %] 96 %  Body mass index is 22.89 kg/m². Input and Output Summary (last 24 hours): Intake/Output Summary (Last 24 hours) at 2023 1743  Last data filed at 2023 1356  Gross per 24 hour   Intake 320 ml   Output 300 ml   Net 20 ml       Physical Exam:   Physical Exam  Vitals and nursing note reviewed. Constitutional:       General: He is not in acute distress. Appearance: He is well-developed. He is ill-appearing (Chronically). He is not diaphoretic. HENT:      Head: Normocephalic and atraumatic. Nose: Nose normal.   Eyes:      General: No scleral icterus. Conjunctiva/sclera: Conjunctivae normal.      Pupils: Pupils are equal, round, and reactive to light. Neck:      Thyroid: No thyromegaly. Cardiovascular:      Rate and Rhythm: Normal rate and regular rhythm. Heart sounds: Normal heart sounds. No murmur heard. No friction rub. No gallop. Pulmonary:      Effort: Pulmonary effort is normal. No respiratory distress. Breath sounds: Normal breath sounds. No stridor. No wheezing or rales. Abdominal:      General: Bowel sounds are normal. There is no distension. Palpations: Abdomen is soft. There is no mass. Tenderness: There is no abdominal tenderness. Musculoskeletal:         General: No swelling or deformity. Cervical back: Neck supple. Skin:     General: Skin is warm and dry. Capillary Refill: Capillary refill takes less than 2 seconds. Findings: Lesion (Mild Vesicles still noted; not yet crusted. ) and rash present. Neurological:      General: No focal deficit present. Mental Status: He is alert.           Additional Data:     Labs:  Results from last 7 days   Lab Units 23  0413   WBC Thousand/uL 6.21   HEMOGLOBIN g/dL 11.8*   HEMATOCRIT % 35.9*   PLATELETS Thousands/uL 159   NEUTROS PCT % 67   LYMPHS PCT % 14   MONOS PCT % 17*   EOS PCT % 2     Results from last 7 days   Lab Units 08/19/23  0413   SODIUM mmol/L 134*   POTASSIUM mmol/L 4.5   CHLORIDE mmol/L 107   CO2 mmol/L 20*   BUN mg/dL 19   CREATININE mg/dL 1.14   ANION GAP mmol/L 7   CALCIUM mg/dL 8.7   ALBUMIN g/dL 3.9   TOTAL BILIRUBIN mg/dL 0.67   ALK PHOS U/L 65   ALT U/L 4*   AST U/L 15   GLUCOSE RANDOM mg/dL 95                       Lines/Drains:  Invasive Devices     Peripheral Intravenous Line  Duration           Peripheral IV 08/16/23 Left;Proximal;Ventral (anterior) Forearm 4 days                      Imaging: Reviewed radiology reports from this admission including: chest xray    CT spine lumbar without contrast    Result Date: 8/16/2023  Impression: No acute compression collapse of the vertebra Multilevel degenerative disc disease with facet joint disease and multilevel foraminal narrowing Moderate right foraminal narrowing at L5-S1 Workstation performed: [id number]       No Chest XR results available for this patient.      Recent Cultures (last 7 days):         Last 24 Hours Medication List:   Current Facility-Administered Medications   Medication Dose Route Frequency Provider Last Rate   • acetaminophen  650 mg Oral Q6H PRN Phil Hercules, DO     • aspirin  81 mg Oral Daily Phil Hercules DO     • atorvastatin  40 mg Oral Daily [employer], DO     • cyanocobalamin  100 mcg Oral Daily Phil Hercules, DO     • heparin (porcine)  5,000 Units Subcutaneous Q8H [address] Hilaria Mckenzie PA-C     • hydrALAZINE  10 mg Intravenous Q6H PRN Phil Hercules DO     • HYDROmorphone  0.2 mg Intravenous Q4H PRN Phil Hercules, DO     • labetalol  10 mg Intravenous Q6H PRN Phil Hercules, DO     • levothyroxine  88 mcg Oral Daily Phil Hercules DO     • lidocaine  1 patch Topical Daily Phil Hercules, DO     • lisinopril  5 mg Oral Daily Phil Hercules, DO     • melatonin  3 mg Oral HS PRN Dorina Atkinson PA-C     • metoprolol succinate  25 mg Oral Daily Phil Diomedes, DO     • nicotine  1 patch Transdermal Daily Phil Diomedes, DO     • nitroglycerin  0.4 mg Sublingual Q5 Min PRN [facility] Diomedes, DO     • ondansetron  4 mg Intravenous Q6H PRN [facility] Diomedes, DO     • oxyCODONE  5 mg Oral Q4H PRN [facility] Diomedes, DO     • oxyCODONE  2.5 mg Oral Q4H PRN [facility] Diomedes, DO     • senna-docusate sodium  1 tablet Oral HS [facility] Diomedes, DO     • tamsulosin  0.4 mg Oral Daily [employer], DO     • valACYclovir  1,000 mg Oral Q12H [address] Phil Diomedes, DO          Today, Patient Was Seen By: Kerri Zarco DO    **Please Note: This note may have been constructed using a voice recognition system. **

## 2023-08-20 NOTE — ASSESSMENT & PLAN NOTE
· DIL concerned due to progressive memory loss.  Getting lost driving since removed care from his resident as he was driving to [place] alone but other times lost.      · If work up negative should follow up with PCP for further work up   · Daniela. #5 Gia Villasenor Final PCP WITH FAMILY  · Neuropsych consult for capacity and cognitive eval  · Patient has Capacity; mild cognitive impairment

## 2023-08-20 NOTE — ASSESSMENT & PLAN NOTE
Lab Results   Component Value Date    AGC4DTOPBCLG 16.341 (H) 08/18/2023    FREET4 0.95 08/18/2023      · Continue home levothyroxine  · Follow up T4 results and may need to adjust levothyroxine dose

## 2023-08-20 NOTE — PLAN OF CARE
Problem: PAIN - ADULT  Goal: Verbalizes/displays adequate comfort level or baseline comfort level  Description: Interventions:  - Encourage patient to monitor pain and request assistance  - Assess pain using appropriate pain scale  - Administer analgesics based on type and severity of pain and evaluate response  - Implement non-pharmacological measures as appropriate and evaluate response  - Consider cultural and social influences on pain and pain management  - Notify physician/advanced practitioner if interventions unsuccessful or patient reports new pain  Outcome: Progressing     Problem: INFECTION - ADULT  Goal: Absence or prevention of progression during hospitalization  Description: INTERVENTIONS:  - Assess and monitor for signs and symptoms of infection  - Monitor lab/diagnostic results  - Monitor all insertion sites, i.e. indwelling lines, tubes, and drains  - Monitor endotracheal if appropriate and nasal secretions for changes in amount and color  - [place] appropriate cooling/warming therapies per order  - Administer medications as ordered  - Instruct and encourage patient and family to use good hand hygiene technique  - Identify and instruct in appropriate isolation precautions for identified infection/condition  Outcome: Progressing  Goal: Absence of fever/infection during neutropenic period  Description: INTERVENTIONS:  - Monitor WBC    Outcome: Progressing     Problem: SAFETY ADULT  Goal: Patient will remain free of falls  Description: INTERVENTIONS:  - Educate patient/family on patient safety including physical limitations  - Instruct patient to call for assistance with activity   - Consult OT/PT to assist with strengthening/mobility   - Keep Call bell within reach  - Keep bed low and locked with side rails adjusted as appropriate  - Keep care items and personal belongings within reach  - Initiate and maintain comfort rounds  - Make Fall Risk Sign visible to staff  - Offer Toileting every  Hours, in advance of need  - Initiate/Maintain alarm  - Obtain necessary fall risk management equipment:   - Apply yellow socks and bracelet for high fall risk patients  - Consider moving patient to room near nurses station  Outcome: Progressing  Goal: Maintain or return to baseline ADL function  Description: INTERVENTIONS:  -  Assess patient's ability to carry out ADLs; assess patient's baseline for ADL function and identify physical deficits which impact ability to perform ADLs (bathing, care of mouth/teeth, toileting, grooming, dressing, etc.)  - Assess/evaluate cause of self-care deficits   - Assess range of motion  - Assess patient's mobility; develop plan if impaired  - Assess patient's need for assistive devices and provide as appropriate  - Encourage maximum independence but intervene and supervise when necessary  - Involve family in performance of ADLs  - Assess for home care needs following discharge   - Consider OT consult to assist with ADL evaluation and planning for discharge  - Provide patient education as appropriate  Outcome: Progressing  Goal: Maintains/Returns to pre admission functional level  Description: INTERVENTIONS:  - Perform BMAT or MOVE assessment daily.   - Set and communicate daily mobility goal to care team and patient/family/caregiver. - Collaborate with rehabilitation services on mobility goals if consulted  - Perform Range of Motion  times a day. - Reposition patient every  hours.   - Dangle patient  times a day  - Stand patient  times a day  - Ambulate patient  times a day  - Out of bed to chair  times a day   - Out of bed for meals  times a day  - Out of bed for toileting  - Record patient progress and toleration of activity level   Outcome: Progressing     Problem: DISCHARGE PLANNING  Goal: Discharge to home or other facility with appropriate resources  Description: INTERVENTIONS:  - Identify barriers to discharge w/patient and caregiver  - Arrange for needed discharge resources and transportation as appropriate  - Identify discharge learning needs (meds, wound care, etc.)  - Arrange for interpretive services to assist at discharge as needed  - Refer to Case Management Department for coordinating discharge planning if the patient needs post-hospital services based on physician/advanced practitioner order or complex needs related to functional status, cognitive ability, or social support system  Outcome: Progressing     Problem: Knowledge Deficit  Goal: Patient/family/caregiver demonstrates understanding of disease process, treatment plan, medications, and discharge instructions  Description: Complete learning assessment and assess knowledge base. Interventions:  - Provide teaching at level of understanding  - Provide teaching via preferred learning methods  Outcome: Progressing     Problem: MOBILITY - ADULT  Goal: Maintain or return to baseline ADL function  Description: INTERVENTIONS:  -  Assess patient's ability to carry out ADLs; assess patient's baseline for ADL function and identify physical deficits which impact ability to perform ADLs (bathing, care of mouth/teeth, toileting, grooming, dressing, etc.)  - Assess/evaluate cause of self-care deficits   - Assess range of motion  - Assess patient's mobility; develop plan if impaired  - Assess patient's need for assistive devices and provide as appropriate  - Encourage maximum independence but intervene and supervise when necessary  - Involve family in performance of ADLs  - Assess for home care needs following discharge   - Consider OT consult to assist with ADL evaluation and planning for discharge  - Provide patient education as appropriate  Outcome: Progressing  Goal: Maintains/Returns to pre admission functional level  Description: INTERVENTIONS:  - Perform BMAT or MOVE assessment daily.   - Set and communicate daily mobility goal to care team and patient/family/caregiver.    - Collaborate with rehabilitation services on mobility goals if consulted  - Perform Range of Motion  times a day. - Reposition patient every  hours.   - Dangle patient  times a day  - Stand patient  times a day  - Ambulate patient  times a day  - Out of bed to chair  times a day   - Out of bed for meal times a day  - Out of bed for toileting  - Record patient progress and toleration of activity level   Outcome: Progressing     Problem: Prexisting or High Potential for Compromised Skin Integrity  Goal: Skin integrity is maintained or improved  Description: INTERVENTIONS:  - Identify patients at risk for skin breakdown  - Assess and monitor skin integrity  - Assess and monitor nutrition and hydration status  - Monitor labs   - Assess for incontinence   - Turn and reposition patient  - Assist with mobility/ambulation  - Relieve pressure over bony prominences  - Avoid friction and shearing  - Provide appropriate hygiene as needed including keeping skin clean and dry  - Evaluate need for skin moisturizer/barrier cream  - Collaborate with interdisciplinary team   - Patient/family teaching  - Consider wound care consult   Outcome: Progressing

## 2023-08-20 NOTE — ASSESSMENT & PLAN NOTE
Blood Pressure: 164/72    Plan:  • Resume home med  • Monitor blood pressure  • PRN IV Hydralazine and Labetalol for SBP > 160

## 2023-08-20 NOTE — ASSESSMENT & PLAN NOTE
• PT/OT Eval and treat recommending post acute rehab services-patient agreeable to placement; would ideally prefer rehab facility in [place]  •   · Patient came in with reported back pain. · CT lumbar spine obtained: No acute compression collapse of the vertebra Multilevel degenerative disc disease with facet joint disease and multilevel foraminal narrowing Moderate right foraminal narrowing at L5-S1  · Conservative measure PT/OT eval pain management   · Responding to lidoderm and low dose oxy.   AM-PAC Basic Mobility:  Basic Mobility Inpatient Raw Score: 16    JH-HLM Achieved: 6: Walk 10 steps or more  JH-HLM Goal: 5: Stand one or more mins  • Encourage appropriate mobility to achieve and improve upon [facility] Goals as noted

## 2023-08-21 LAB
ALBUMIN SERPL BCP-MCNC: 3.6 G/DL (ref 3.5–5)
ALP SERPL-CCNC: 63 U/L (ref 34–104)
ALT SERPL W P-5'-P-CCNC: 10 U/L (ref 7–52)
ANION GAP SERPL CALCULATED.3IONS-SCNC: 5 MMOL/L
AST SERPL W P-5'-P-CCNC: 19 U/L (ref 13–39)
BASOPHILS # BLD AUTO: 0.03 THOUSANDS/ÂΜL (ref 0–0.1)
BASOPHILS NFR BLD AUTO: 0 % (ref 0–1)
BILIRUB SERPL-MCNC: 0.62 MG/DL (ref 0.2–1)
BUN SERPL-MCNC: 26 MG/DL (ref 5–25)
CALCIUM SERPL-MCNC: 8.8 MG/DL (ref 8.4–10.2)
CHLORIDE SERPL-SCNC: 104 MMOL/L (ref 96–108)
CO2 SERPL-SCNC: 24 MMOL/L (ref 21–32)
CREAT SERPL-MCNC: 1.32 MG/DL (ref 0.6–1.3)
EOSINOPHIL # BLD AUTO: 0.35 THOUSAND/ÂΜL (ref 0–0.61)
EOSINOPHIL NFR BLD AUTO: 5 % (ref 0–6)
ERYTHROCYTE [DISTWIDTH] IN BLOOD BY AUTOMATED COUNT: 14 % (ref 11.6–15.1)
GFR SERPL CREATININE-BSD FRML MDRD: 46 ML/MIN/1.73SQ M
GLUCOSE SERPL-MCNC: 102 MG/DL (ref 65–140)
HCT VFR BLD AUTO: 35.5 % (ref 36.5–49.3)
HGB BLD-MCNC: 11.7 G/DL (ref 12–17)
IMM GRANULOCYTES # BLD AUTO: 0.02 THOUSAND/UL (ref 0–0.2)
IMM GRANULOCYTES NFR BLD AUTO: 0 % (ref 0–2)
LYMPHOCYTES # BLD AUTO: 1.38 THOUSANDS/ÂΜL (ref 0.6–4.47)
LYMPHOCYTES NFR BLD AUTO: 21 % (ref 14–44)
MAGNESIUM SERPL-MCNC: 1.9 MG/DL (ref 1.9–2.7)
MCH RBC QN AUTO: 32.2 PG (ref 26.8–34.3)
MCHC RBC AUTO-ENTMCNC: 33 G/DL (ref 31.4–37.4)
MCV RBC AUTO: 98 FL (ref 82–98)
MONOCYTES # BLD AUTO: 1.08 THOUSAND/ÂΜL (ref 0.17–1.22)
MONOCYTES NFR BLD AUTO: 16 % (ref 4–12)
NEUTROPHILS # BLD AUTO: 3.84 THOUSANDS/ÂΜL (ref 1.85–7.62)
NEUTS SEG NFR BLD AUTO: 58 % (ref 43–75)
NRBC BLD AUTO-RTO: 0 /100 WBCS
PLATELET # BLD AUTO: 193 THOUSANDS/UL (ref 149–390)
PMV BLD AUTO: 10 FL (ref 8.9–12.7)
POTASSIUM SERPL-SCNC: 4.3 MMOL/L (ref 3.5–5.3)
PROT SERPL-MCNC: 6 G/DL (ref 6.4–8.4)
RBC # BLD AUTO: 3.63 MILLION/UL (ref 3.88–5.62)
SODIUM SERPL-SCNC: 133 MMOL/L (ref 135–147)
WBC # BLD AUTO: 6.7 THOUSAND/UL (ref 4.31–10.16)

## 2023-08-21 PROCEDURE — 99233 SBSQ HOSP IP/OBS HIGH 50: CPT | Performed by: INTERNAL MEDICINE

## 2023-08-21 PROCEDURE — 83735 ASSAY OF MAGNESIUM: CPT | Performed by: INTERNAL MEDICINE

## 2023-08-21 PROCEDURE — 80053 COMPREHEN METABOLIC PANEL: CPT | Performed by: INTERNAL MEDICINE

## 2023-08-21 PROCEDURE — 85025 COMPLETE CBC W/AUTO DIFF WBC: CPT | Performed by: INTERNAL MEDICINE

## 2023-08-21 RX ORDER — SODIUM CHLORIDE, SODIUM GLUCONATE, SODIUM ACETATE, POTASSIUM CHLORIDE, MAGNESIUM CHLORIDE, SODIUM PHOSPHATE, DIBASIC, AND POTASSIUM PHOSPHATE .53; .5; .37; .037; .03; .012; .00082 G/100ML; G/100ML; G/100ML; G/100ML; G/100ML; G/100ML; G/100ML
75 INJECTION, SOLUTION INTRAVENOUS CONTINUOUS
Status: DISCONTINUED | OUTPATIENT
Start: 2023-08-21 | End: 2023-08-22

## 2023-08-21 RX ADMIN — SENNOSIDES AND DOCUSATE SODIUM 1 TABLET: 50; 8.6 TABLET ORAL at 01:26

## 2023-08-21 RX ADMIN — VALACYCLOVIR HYDROCHLORIDE 1000 MG: 500 TABLET, FILM COATED ORAL at 10:24

## 2023-08-21 RX ADMIN — SODIUM CHLORIDE, SODIUM GLUCONATE, SODIUM ACETATE, POTASSIUM CHLORIDE, MAGNESIUM CHLORIDE, SODIUM PHOSPHATE, DIBASIC, AND POTASSIUM PHOSPHATE 75 ML/HR: .53; .5; .37; .037; .03; .012; .00082 INJECTION, SOLUTION INTRAVENOUS at 23:06

## 2023-08-21 RX ADMIN — ASPIRIN 81 MG: 81 TABLET, COATED ORAL at 10:24

## 2023-08-21 RX ADMIN — ACETAMINOPHEN 650 MG: 325 TABLET, FILM COATED ORAL at 23:08

## 2023-08-21 RX ADMIN — TAMSULOSIN HYDROCHLORIDE 0.4 MG: 0.4 CAPSULE ORAL at 15:36

## 2023-08-21 RX ADMIN — SODIUM CHLORIDE, SODIUM GLUCONATE, SODIUM ACETATE, POTASSIUM CHLORIDE, MAGNESIUM CHLORIDE, SODIUM PHOSPHATE, DIBASIC, AND POTASSIUM PHOSPHATE 75 ML/HR: .53; .5; .37; .037; .03; .012; .00082 INJECTION, SOLUTION INTRAVENOUS at 10:25

## 2023-08-21 RX ADMIN — LEVOTHYROXINE SODIUM 88 MCG: 88 TABLET ORAL at 06:50

## 2023-08-21 RX ADMIN — HEPARIN SODIUM 5000 UNITS: 5000 INJECTION INTRAVENOUS; SUBCUTANEOUS at 22:43

## 2023-08-21 RX ADMIN — VITAM B12 100 MCG: 100 TAB at 10:24

## 2023-08-21 RX ADMIN — METOPROLOL SUCCINATE 25 MG: 25 TABLET, EXTENDED RELEASE ORAL at 10:24

## 2023-08-21 RX ADMIN — HEPARIN SODIUM 5000 UNITS: 5000 INJECTION INTRAVENOUS; SUBCUTANEOUS at 06:50

## 2023-08-21 RX ADMIN — OXYCODONE HYDROCHLORIDE 5 MG: 5 TABLET ORAL at 06:50

## 2023-08-21 RX ADMIN — VALACYCLOVIR HYDROCHLORIDE 1000 MG: 500 TABLET, FILM COATED ORAL at 22:45

## 2023-08-21 RX ADMIN — HEPARIN SODIUM 5000 UNITS: 5000 INJECTION INTRAVENOUS; SUBCUTANEOUS at 15:36

## 2023-08-21 RX ADMIN — HYDROMORPHONE HYDROCHLORIDE 0.2 MG: 0.2 INJECTION, SOLUTION INTRAMUSCULAR; INTRAVENOUS; SUBCUTANEOUS at 10:28

## 2023-08-21 RX ADMIN — SENNOSIDES AND DOCUSATE SODIUM 1 TABLET: 50; 8.6 TABLET ORAL at 22:48

## 2023-08-21 RX ADMIN — VALACYCLOVIR HYDROCHLORIDE 1000 MG: 500 TABLET, FILM COATED ORAL at 01:26

## 2023-08-21 RX ADMIN — ATORVASTATIN CALCIUM 40 MG: 40 TABLET, FILM COATED ORAL at 15:36

## 2023-08-21 NOTE — ASSESSMENT & PLAN NOTE
• PT/OT Eval and treat recommending post acute rehab services-patient agreeable to placement; plan for discharge to rehab on 8/23    · Patient came in with reported back pain. · CT lumbar spine obtained: No acute compression collapse of the vertebra Multilevel degenerative disc disease with facet joint disease and multilevel foraminal narrowing Moderate right foraminal narrowing at L5-S1  · Conservative measure PT/OT eval pain management   · Responding to lidoderm and low dose oxy.   AM-PAC Basic Mobility:  Basic Mobility Inpatient Raw Score: 13    JH-HLM Achieved: 5: Stand (1 or more minutes)  JH-HLM Goal: 4: Move to chair/commode  • Encourage appropriate mobility to achieve and improve upon [facility] Goals as noted

## 2023-08-21 NOTE — ASSESSMENT & PLAN NOTE
· DIL concerned due to progressive memory loss.  Getting lost driving since removed care from his resident as he was driving to [place] alone but other times lost.      · If work up negative should follow up with PCP for further work up   · Daniela. #5 Gia Villasenor Final PCP WITH FAMILY  · Neuropsych consult for capacity and cognitive eval  · Patient has Capacity; mild cognitive impairment

## 2023-08-21 NOTE — PLAN OF CARE
Problem: PAIN - ADULT  Goal: Verbalizes/displays adequate comfort level or baseline comfort level  Description: Interventions:  - Encourage patient to monitor pain and request assistance  - Assess pain using appropriate pain scale  - Administer analgesics based on type and severity of pain and evaluate response  - Implement non-pharmacological measures as appropriate and evaluate response  - Consider cultural and social influences on pain and pain management  - Notify physician/advanced practitioner if interventions unsuccessful or patient reports new pain  Outcome: Progressing     Problem: INFECTION - ADULT  Goal: Absence or prevention of progression during hospitalization  Description: INTERVENTIONS:  - Assess and monitor for signs and symptoms of infection  - Monitor lab/diagnostic results  - Monitor all insertion sites, i.e. indwelling lines, tubes, and drains  - Monitor endotracheal if appropriate and nasal secretions for changes in amount and color  - Unity appropriate cooling/warming therapies per order  - Administer medications as ordered  - Instruct and encourage patient and family to use good hand hygiene technique  - Identify and instruct in appropriate isolation precautions for identified infection/condition  Outcome: Progressing  Goal: Absence of fever/infection during neutropenic period  Description: INTERVENTIONS:  - Monitor WBC    Outcome: Progressing     Problem: SAFETY ADULT  Goal: Patient will remain free of falls  Description: INTERVENTIONS:  - Educate patient/family on patient safety including physical limitations  - Instruct patient to call for assistance with activity   - Consult OT/PT to assist with strengthening/mobility   - Keep Call bell within reach  - Keep bed low and locked with side rails adjusted as appropriate  - Keep care items and personal belongings within reach  - Initiate and maintain comfort rounds  - Make Fall Risk Sign visible to staff  - Offer Toileting every  Hours, in advance of need  - Initiate/Maintain alarm  - Obtain necessary fall risk management equipment:   - Apply yellow socks and bracelet for high fall risk patients  - Consider moving patient to room near nurses station  Outcome: Progressing  Goal: Maintain or return to baseline ADL function  Description: INTERVENTIONS:  -  Assess patient's ability to carry out ADLs; assess patient's baseline for ADL function and identify physical deficits which impact ability to perform ADLs (bathing, care of mouth/teeth, toileting, grooming, dressing, etc.)  - Assess/evaluate cause of self-care deficits   - Assess range of motion  - Assess patient's mobility; develop plan if impaired  - Assess patient's need for assistive devices and provide as appropriate  - Encourage maximum independence but intervene and supervise when necessary  - Involve family in performance of ADLs  - Assess for home care needs following discharge   - Consider OT consult to assist with ADL evaluation and planning for discharge  - Provide patient education as appropriate  Outcome: Progressing  Goal: Maintains/Returns to pre admission functional level  Description: INTERVENTIONS:  - Perform BMAT or MOVE assessment daily.   - Set and communicate daily mobility goal to care team and patient/family/caregiver. - Collaborate with rehabilitation services on mobility goals if consulted  - Perform Range of Motion  times a day. - Reposition patient every  hours.   - Dangle patient  times a day  - Stand patient  times a day  - Ambulate patient  times a day  - Out of bed to chair  times a day   - Out of bed for meals  times a day  - Out of bed for toileting  - Record patient progress and toleration of activity level   Outcome: Progressing     Problem: DISCHARGE PLANNING  Goal: Discharge to home or other facility with appropriate resources  Description: INTERVENTIONS:  - Identify barriers to discharge w/patient and caregiver  - Arrange for needed discharge resources and transportation as appropriate  - Identify discharge learning needs (meds, wound care, etc.)  - Arrange for interpretive services to assist at discharge as needed  - Refer to Case Management Department for coordinating discharge planning if the patient needs post-hospital services based on physician/advanced practitioner order or complex needs related to functional status, cognitive ability, or social support system  Outcome: Progressing     Problem: Knowledge Deficit  Goal: Patient/family/caregiver demonstrates understanding of disease process, treatment plan, medications, and discharge instructions  Description: Complete learning assessment and assess knowledge base. Interventions:  - Provide teaching at level of understanding  - Provide teaching via preferred learning methods  Outcome: Progressing     Problem: MOBILITY - ADULT  Goal: Maintain or return to baseline ADL function  Description: INTERVENTIONS:  -  Assess patient's ability to carry out ADLs; assess patient's baseline for ADL function and identify physical deficits which impact ability to perform ADLs (bathing, care of mouth/teeth, toileting, grooming, dressing, etc.)  - Assess/evaluate cause of self-care deficits   - Assess range of motion  - Assess patient's mobility; develop plan if impaired  - Assess patient's need for assistive devices and provide as appropriate  - Encourage maximum independence but intervene and supervise when necessary  - Involve family in performance of ADLs  - Assess for home care needs following discharge   - Consider OT consult to assist with ADL evaluation and planning for discharge  - Provide patient education as appropriate  Outcome: Progressing  Goal: Maintains/Returns to pre admission functional level  Description: INTERVENTIONS:  - Perform BMAT or MOVE assessment daily.   - Set and communicate daily mobility goal to care team and patient/family/caregiver.    - Collaborate with rehabilitation services on mobility goals if consulted  - Perform Range of Motion  times a day. - Reposition patient every  hours.   - Dangle patient  times a day  - Stand patient  times a day  - Ambulate patient  times a day  - Out of bed to chair  times a day   - Out of bed for meals  times a day  - Out of bed for toileting  - Record patient progress and toleration of activity level   Outcome: Progressing     Problem: Prexisting or High Potential for Compromised Skin Integrity  Goal: Skin integrity is maintained or improved  Description: INTERVENTIONS:  - Identify patients at risk for skin breakdown  - Assess and monitor skin integrity  - Assess and monitor nutrition and hydration status  - Monitor labs   - Assess for incontinence   - Turn and reposition patient  - Assist with mobility/ambulation  - Relieve pressure over bony prominences  - Avoid friction and shearing  - Provide appropriate hygiene as needed including keeping skin clean and dry  - Evaluate need for skin moisturizer/barrier cream  - Collaborate with interdisciplinary team   - Patient/family teaching  - Consider wound care consult   Outcome: Progressing     Problem: RESPIRATORY - ADULT  Goal: Achieves optimal ventilation and oxygenation  Description: INTERVENTIONS:  - Assess for changes in respiratory status  - Assess for changes in mentation and behavior  - Position to facilitate oxygenation and minimize respiratory effort  - Oxygen administered by appropriate delivery if ordered  - Initiate smoking cessation education as indicated  - Encourage broncho-pulmonary hygiene including cough, deep breathe, Incentive Spirometry  - Assess the need for suctioning and aspirate as needed  - Assess and instruct to report SOB or any respiratory difficulty  - Respiratory Therapy support as indicated  Outcome: Progressing     Problem: GENITOURINARY - ADULT  Goal: Absence of urinary retention  Description: INTERVENTIONS:  - Assess patient’s ability to void and empty bladder  - Monitor I/O  - Bladder scan as needed  - Discuss with physician/AP medications to alleviate retention as needed  - Discuss catheterization for long term situations as appropriate  Outcome: Progressing     Problem: METABOLIC, FLUID AND ELECTROLYTES - ADULT  Goal: Electrolytes maintained within normal limits  Description: INTERVENTIONS:  - Monitor labs and assess patient for signs and symptoms of electrolyte imbalances  - Administer electrolyte replacement as ordered  - Monitor response to electrolyte replacements, including repeat lab results as appropriate  - Instruct patient on fluid and nutrition as appropriate  Outcome: Progressing  Goal: Fluid balance maintained  Description: INTERVENTIONS:  - Monitor labs   - Monitor I/O and WT  - Instruct patient on fluid and nutrition as appropriate  - Assess for signs & symptoms of volume excess or deficit  Outcome: Progressing  Goal: Glucose maintained within target range  Description: INTERVENTIONS:  - Monitor Blood Glucose as ordered  - Assess for signs and symptoms of hyperglycemia and hypoglycemia  - Administer ordered medications to maintain glucose within target range  - Assess nutritional intake and initiate nutrition service referral as needed  Outcome: Progressing     Problem: SKIN/TISSUE INTEGRITY - ADULT  Goal: Skin Integrity remains intact(Skin Breakdown Prevention)  Description: Assess:  -Perform Jason assessment every   -Clean and moisturize skin every   -Inspect skin when repositioning, toileting, and assisting with ADLS  -Assess under medical devices such as  every   -Assess extremities for adequate circulation and sensation     Bed Management:  -Have minimal linens on bed & keep smooth, unwrinkled  -Change linens as needed when moist or perspiring  -Avoid sitting or lying in one position for more than  hours while in bed  -Keep HOB at degrees     Toileting:  -Offer bedside commode  -Assess for incontinence every   -Use incontinent care products after each incontinent episode such as Activity:  -Mobilize patient  times a day  -Encourage activity and walks on unit  -Encourage or provide ROM exercises   -Turn and reposition patient every  Hours  -Use appropriate equipment to lift or move patient in bed  -Instruct/ Assist with weight shifting every  when out of bed in chair  -Consider limitation of chair time  hour intervals    Skin Care:  -Avoid use of baby powder, tape, friction and shearing, hot water or constrictive clothing  -Relieve pressure over bony prominences using   -Do not massage red bony areas    Next Steps:  -Teach patient strategies to minimize risks such as    -Consider consults to  interdisciplinary teams such as   Outcome: Progressing  Goal: Incision(s), wounds(s) or drain site(s) healing without S/S of infection  Description: INTERVENTIONS  - Assess and document dressing, incision, wound bed, drain sites and surrounding tissue  - Provide patient and family education  - Perform skin care/dressing changes every  Outcome: Progressing     Problem: HEMATOLOGIC - ADULT  Goal: Maintains hematologic stability  Description: INTERVENTIONS  - Assess for signs and symptoms of bleeding or hemorrhage  - Monitor labs  - Administer supportive blood products/factors as ordered and appropriate  Outcome: Progressing

## 2023-08-21 NOTE — ASSESSMENT & PLAN NOTE
Blood Pressure: 154/66    Plan:  • Resume home med  • Monitor blood pressure  • PRN IV Hydralazine and Labetalol for SBP > 160

## 2023-08-21 NOTE — ASSESSMENT & PLAN NOTE
Rash noted on the right lower extremity and right low back  Photos uploaded to media by wound and nursing team    Valtrex 1000 mg BID - renal dosing  Will need images of lesions to ensure crusting is present (signifying resolution) prior to removing precautions

## 2023-08-21 NOTE — ASSESSMENT & PLAN NOTE
Lab Results   Component Value Date    [id number] 16.341 (H) 08/18/2023    FREET4 0.95 08/18/2023      · Continue home levothyroxine  · Follow up T4 results and may need to adjust levothyroxine dose

## 2023-08-21 NOTE — ASSESSMENT & PLAN NOTE
Recent Labs     08/19/23  0413 08/21/23  0439   CREATININE 1.14 1.32*   EGFR 55 46     Estimated Creatinine Clearance: 31.1 mL/min (A) (by C-G formula based on SCr of 1.32 mg/dL (H)). • POA; Further chart reviewed revealed Cr.  Baseline likely closely to 1.3  • Likely prerenal leukopenia secondary to decreased oral intake    Plan:  • Monitor BMP daily and observe for downward trend of creatinine  • Gentle IV fluid hydration  • Avoid hypoperfusion of the kidneys, minimize nephrotoxins  · Hold Lisinopril;  Renal Dose Valtrex

## 2023-08-21 NOTE — CASE MANAGEMENT
Case Management Discharge Planning Note    Patient name Morgan Fernandes  Location [address] [facility] [place] 427/-57 MRN [id number]  : 1930 Date 2023       Current Admission Date: 2023  Current Admission Diagnosis:ROMAN (acute kidney injury) [facility])   Patient Active Problem List    Diagnosis Date Noted   • Herpes zoster with complication    • Memory loss 2023   • Ambulatory dysfunction 2023   • ROMAN (acute kidney injury) ([address]) 2023   • History of prostate cancer 2019   • Mitral valve disorder 2018   • Hyperlipemia, mixed 2018   • Primary hypertension 2018   • Acquired hypothyroidism 2018   • Coronary artery disease involving native coronary artery of native heart without angina pectoris 04/15/2018      LOS (days): 5  Geometric Mean LOS (GMLOS) (days): 3.10  Days to GMLOS:-1.9     OBJECTIVE:  Risk of Unplanned Readmission Score: 11.94         Current admission status: Inpatient   Preferred Pharmacy:   [facility] [address] [place] [address]  Phone: [phone] Fax: [phone] [place] [address]  Phone: [phone] Fax: [phone]    Primary Care Provider: Ashvin Chapman PA-C    Primary Insurance: MEDICARE  Secondary Insurance: COMMERCIAL MISCELLANEOUS    DISCHARGE DETAILS:    Discharge planning discussed with[de-identified] Pt and his son Candido Gilbert over the phone  Freedom of Choice: Yes  Comments - Freedom of Choice: Pt son called CM and decided for pt to go [facility] skilled nursing and rehab facility for STR. CM reserved facility via [employer]. As per SLIM, pt has an anticipated dc within 24hrs. Larry Cea was made aware via [employer]. Pt son Candido Gilbert informed pt that pt uses [facility] as pt perfered pharmacy.   CM contacted family/caregiver?: Yes  Were Treatment Team discharge recommendations reviewed with patient/caregiver?: Yes  Did patient/caregiver verbalize understanding of patient care needs?: Yes  Were patient/caregiver advised of the risks associated with not following Treatment Team discharge recommendations?: Yes    Contacts  Patient Contacts: Phyllis Luu  Relationship to Patient[de-identified] Family  Contact Method: Phone  Phone Number: [phone]  Reason/Outcome: Continuity of Care, Emergency Contact, Discharge [facility]         Is the patient interested in [facility] at discharge?: No    DME Referral Provided  Referral made for DME?: No    Other Referral/Resources/Interventions Provided:  Interventions: Short Term Rehab    Would you like to participate in our [facility] service program?  : No - Declined    Treatment Team Recommendation: Short Term Rehab  Discharge Destination Plan[de-identified] Short Term Rehab  Transport at Discharge : BLS Ambulance            IMM Given (Date):: 08/21/23     Family notified[de-identified] Pt over the phone as pt is on contact airbone isolation  Additional Comments: CM called and reviewed IMM with pt. Pt expressed full and complete understanding. Verbal consent obtained as pt is on airbone procaution.  Original placed in MR for scanning    Accepting Facility Name, [address] [place] [address] : [address] [place] [address] [facility]  Receiving Facility/Agency Phone Number: Phone: [phone]  Facility/Agency Fax Number: Fax: [phone]

## 2023-08-21 NOTE — CASE MANAGEMENT
Case Management Progress Note    Patient name Prudy Grounds  Location /-48 MRN [id number]  : 1930 Date 2023       LOS (days): 5  Geometric Mean LOS (GMLOS) (days): 3.10  Days to GMLOS:-1.7        OBJECTIVE:        Current admission status: Inpatient  Preferred Pharmacy:   [address] [place] [address], [address] [facility] [address]  Phone: [phone] Fax: [phone]    [facility]  for [address] [facility], [address], [address] [address]  Phone: [phone] Fax: [phone]    Primary Care Provider: Kalyan Jordna PA-C    Primary Insurance: MEDICARE  Secondary Insurance: COMMERCIAL MISCELLANEOUS    PROGRESS NOTE:  CM called and spoke with pt son Curtis Rehman at length and discussed dc plans. As per pt son, family is interested in taking pt home with homecare in [place] as pt will be moving in with pt son Curtis Rehman at [facility] [address]. Pt son is aware that pt is currently on contact and airborne precaution. As per son, if pt continue to be contagious, he would choose a rehab facility here in [place] temporary vs taking him to [place] with homecare. SLIM made aware. CM continues to follow.

## 2023-08-22 PROBLEM — E86.0 DEHYDRATION: Status: ACTIVE | Noted: 2023-08-22

## 2023-08-22 LAB
ALBUMIN SERPL BCP-MCNC: 3.4 G/DL (ref 3.5–5)
ALP SERPL-CCNC: 66 U/L (ref 34–104)
ALT SERPL W P-5'-P-CCNC: 9 U/L (ref 7–52)
ANION GAP SERPL CALCULATED.3IONS-SCNC: 6 MMOL/L
AST SERPL W P-5'-P-CCNC: 16 U/L (ref 13–39)
BASOPHILS # BLD AUTO: 0.01 THOUSANDS/ÂΜL (ref 0–0.1)
BASOPHILS NFR BLD AUTO: 0 % (ref 0–1)
BILIRUB SERPL-MCNC: 0.59 MG/DL (ref 0.2–1)
BUN SERPL-MCNC: 30 MG/DL (ref 5–25)
CALCIUM ALBUM COR SERPL-MCNC: 9 MG/DL (ref 8.3–10.1)
CALCIUM SERPL-MCNC: 8.5 MG/DL (ref 8.4–10.2)
CHLORIDE SERPL-SCNC: 106 MMOL/L (ref 96–108)
CO2 SERPL-SCNC: 22 MMOL/L (ref 21–32)
CREAT SERPL-MCNC: 1.32 MG/DL (ref 0.6–1.3)
EOSINOPHIL # BLD AUTO: 0.1 THOUSAND/ÂΜL (ref 0–0.61)
EOSINOPHIL NFR BLD AUTO: 1 % (ref 0–6)
ERYTHROCYTE [DISTWIDTH] IN BLOOD BY AUTOMATED COUNT: 14 % (ref 11.6–15.1)
GFR SERPL CREATININE-BSD FRML MDRD: 46 ML/MIN/1.73SQ M
GLUCOSE SERPL-MCNC: 132 MG/DL (ref 65–140)
HCT VFR BLD AUTO: 31.5 % (ref 36.5–49.3)
HGB BLD-MCNC: 10.3 G/DL (ref 12–17)
IMM GRANULOCYTES # BLD AUTO: 0.04 THOUSAND/UL (ref 0–0.2)
IMM GRANULOCYTES NFR BLD AUTO: 1 % (ref 0–2)
LYMPHOCYTES # BLD AUTO: 1.06 THOUSANDS/ÂΜL (ref 0.6–4.47)
LYMPHOCYTES NFR BLD AUTO: 13 % (ref 14–44)
MAGNESIUM SERPL-MCNC: 1.9 MG/DL (ref 1.9–2.7)
MCH RBC QN AUTO: 31.7 PG (ref 26.8–34.3)
MCHC RBC AUTO-ENTMCNC: 32.7 G/DL (ref 31.4–37.4)
MCV RBC AUTO: 97 FL (ref 82–98)
MONOCYTES # BLD AUTO: 1.03 THOUSAND/ÂΜL (ref 0.17–1.22)
MONOCYTES NFR BLD AUTO: 13 % (ref 4–12)
NEUTROPHILS # BLD AUTO: 5.89 THOUSANDS/ÂΜL (ref 1.85–7.62)
NEUTS SEG NFR BLD AUTO: 72 % (ref 43–75)
NRBC BLD AUTO-RTO: 0 /100 WBCS
PLATELET # BLD AUTO: 180 THOUSANDS/UL (ref 149–390)
PMV BLD AUTO: 9.9 FL (ref 8.9–12.7)
POTASSIUM SERPL-SCNC: 4.1 MMOL/L (ref 3.5–5.3)
PROT SERPL-MCNC: 5.7 G/DL (ref 6.4–8.4)
RBC # BLD AUTO: 3.25 MILLION/UL (ref 3.88–5.62)
SODIUM SERPL-SCNC: 134 MMOL/L (ref 135–147)
WBC # BLD AUTO: 8.13 THOUSAND/UL (ref 4.31–10.16)

## 2023-08-22 PROCEDURE — 80053 COMPREHEN METABOLIC PANEL: CPT | Performed by: INTERNAL MEDICINE

## 2023-08-22 PROCEDURE — 99232 SBSQ HOSP IP/OBS MODERATE 35: CPT | Performed by: STUDENT IN AN ORGANIZED HEALTH CARE EDUCATION/TRAINING PROGRAM

## 2023-08-22 PROCEDURE — 85025 COMPLETE CBC W/AUTO DIFF WBC: CPT | Performed by: INTERNAL MEDICINE

## 2023-08-22 PROCEDURE — 83735 ASSAY OF MAGNESIUM: CPT | Performed by: INTERNAL MEDICINE

## 2023-08-22 RX ORDER — LANOLIN ALCOHOL/MO/W.PET/CERES
800 CREAM (GRAM) TOPICAL ONCE
Status: COMPLETED | OUTPATIENT
Start: 2023-08-22 | End: 2023-08-22

## 2023-08-22 RX ADMIN — VALACYCLOVIR HYDROCHLORIDE 1000 MG: 500 TABLET, FILM COATED ORAL at 08:38

## 2023-08-22 RX ADMIN — SENNOSIDES AND DOCUSATE SODIUM 1 TABLET: 50; 8.6 TABLET ORAL at 21:27

## 2023-08-22 RX ADMIN — VITAM B12 100 MCG: 100 TAB at 08:38

## 2023-08-22 RX ADMIN — Medication 800 MG: at 12:34

## 2023-08-22 RX ADMIN — ASPIRIN 81 MG: 81 TABLET, COATED ORAL at 08:38

## 2023-08-22 RX ADMIN — HEPARIN SODIUM 5000 UNITS: 5000 INJECTION INTRAVENOUS; SUBCUTANEOUS at 14:18

## 2023-08-22 RX ADMIN — TAMSULOSIN HYDROCHLORIDE 0.4 MG: 0.4 CAPSULE ORAL at 18:11

## 2023-08-22 RX ADMIN — VALACYCLOVIR HYDROCHLORIDE 1000 MG: 500 TABLET, FILM COATED ORAL at 21:26

## 2023-08-22 RX ADMIN — LEVOTHYROXINE SODIUM 88 MCG: 88 TABLET ORAL at 05:22

## 2023-08-22 RX ADMIN — ATORVASTATIN CALCIUM 40 MG: 40 TABLET, FILM COATED ORAL at 18:11

## 2023-08-22 RX ADMIN — HEPARIN SODIUM 5000 UNITS: 5000 INJECTION INTRAVENOUS; SUBCUTANEOUS at 05:22

## 2023-08-22 RX ADMIN — METOPROLOL SUCCINATE 25 MG: 25 TABLET, EXTENDED RELEASE ORAL at 08:38

## 2023-08-22 NOTE — ASSESSMENT & PLAN NOTE
Dehydration, POA, due to decreased oral intake evidenced by ROMAN (Cr 1.78> 1.59> 1.44) requiring IVF, holding of Ramipril and monitoring of BMP.

## 2023-08-22 NOTE — ASSESSMENT & PLAN NOTE
Recent Labs     08/21/23  0439 08/22/23  0505   CREATININE 1.32* 1.32*   EGFR 46 46     Estimated Creatinine Clearance: 31.1 mL/min (A) (by C-G formula based on SCr of 1.32 mg/dL (H)). • POA; Further chart reviewed revealed Cr.  Baseline likely closely to 1.3  • Likely prerenal leukopenia secondary to decreased oral intake    Plan:   • Monitor BMP daily and observe for downward trend of creatinine  • Gentle IV fluid hydration  • Avoid hypoperfusion of the kidneys, minimize nephrotoxins  · Hold Lisinopril;  Renal Dose Valtrex

## 2023-08-22 NOTE — PLAN OF CARE
Problem: PAIN - ADULT  Goal: Verbalizes/displays adequate comfort level or baseline comfort level  Description: Interventions:  - Encourage patient to monitor pain and request assistance  - Assess pain using appropriate pain scale  - Administer analgesics based on type and severity of pain and evaluate response  - Implement non-pharmacological measures as appropriate and evaluate response  - Consider cultural and social influences on pain and pain management  - Notify physician/advanced practitioner if interventions unsuccessful or patient reports new pain  Outcome: Progressing     Problem: INFECTION - ADULT  Goal: Absence or prevention of progression during hospitalization  Description: INTERVENTIONS:  - Assess and monitor for signs and symptoms of infection  - Monitor lab/diagnostic results  - Monitor all insertion sites, i.e. indwelling lines, tubes, and drains  - Monitor endotracheal if appropriate and nasal secretions for changes in amount and color  - Amlin appropriate cooling/warming therapies per order  - Administer medications as ordered  - Instruct and encourage patient and family to use good hand hygiene technique  - Identify and instruct in appropriate isolation precautions for identified infection/condition  Outcome: Progressing  Goal: Absence of fever/infection during neutropenic period  Description: INTERVENTIONS:  - Monitor WBC    Outcome: Progressing     Problem: SAFETY ADULT  Goal: Patient will remain free of falls  Description: INTERVENTIONS:  - Educate patient/family on patient safety including physical limitations  - Instruct patient to call for assistance with activity   - Consult OT/PT to assist with strengthening/mobility   - Keep Call bell within reach  - Keep bed low and locked with side rails adjusted as appropriate  - Keep care items and personal belongings within reach  - Initiate and maintain comfort rounds  - Make Fall Risk Sign visible to staff  - Offer Toileting every  Hours, in advance of need  - Initiate/Maintain alarm  - Obtain necessary fall risk management equipment:   - Apply yellow socks and bracelet for high fall risk patients  - Consider moving patient to room near nurses station  Outcome: Progressing  Goal: Maintain or return to baseline ADL function  Description: INTERVENTIONS:  -  Assess patient's ability to carry out ADLs; assess patient's baseline for ADL function and identify physical deficits which impact ability to perform ADLs (bathing, care of mouth/teeth, toileting, grooming, dressing, etc.)  - Assess/evaluate cause of self-care deficits   - Assess range of motion  - Assess patient's mobility; develop plan if impaired  - Assess patient's need for assistive devices and provide as appropriate  - Encourage maximum independence but intervene and supervise when necessary  - Involve family in performance of ADLs  - Assess for home care needs following discharge   - Consider OT consult to assist with ADL evaluation and planning for discharge  - Provide patient education as appropriate  Outcome: Progressing  Goal: Maintains/Returns to pre admission functional level  Description: INTERVENTIONS:  - Perform BMAT or MOVE assessment daily.   - Set and communicate daily mobility goal to care team and patient/family/caregiver. - Collaborate with rehabilitation services on mobility goals if consulted  - Perform Range of Motion  times a day. - Reposition patient every  hours.   - Dangle patient  times a day  - Stand patient  times a day  - Ambulate patient  times a day  - Out of bed to chair  times a day   - Out of bed for meals  times a day  - Out of bed for toileting  - Record patient progress and toleration of activity level   Outcome: Progressing     Problem: DISCHARGE PLANNING  Goal: Discharge to home or other facility with appropriate resources  Description: INTERVENTIONS:  - Identify barriers to discharge w/patient and caregiver  - Arrange for needed discharge resources and transportation as appropriate  - Identify discharge learning needs (meds, wound care, etc.)  - Arrange for interpretive services to assist at discharge as needed  - Refer to Case Management Department for coordinating discharge planning if the patient needs post-hospital services based on physician/advanced practitioner order or complex needs related to functional status, cognitive ability, or social support system  Outcome: Progressing     Problem: Knowledge Deficit  Goal: Patient/family/caregiver demonstrates understanding of disease process, treatment plan, medications, and discharge instructions  Description: Complete learning assessment and assess knowledge base. Interventions:  - Provide teaching at level of understanding  - Provide teaching via preferred learning methods  Outcome: Progressing     Problem: MOBILITY - ADULT  Goal: Maintain or return to baseline ADL function  Description: INTERVENTIONS:  -  Assess patient's ability to carry out ADLs; assess patient's baseline for ADL function and identify physical deficits which impact ability to perform ADLs (bathing, care of mouth/teeth, toileting, grooming, dressing, etc.)  - Assess/evaluate cause of self-care deficits   - Assess range of motion  - Assess patient's mobility; develop plan if impaired  - Assess patient's need for assistive devices and provide as appropriate  - Encourage maximum independence but intervene and supervise when necessary  - Involve family in performance of ADLs  - Assess for home care needs following discharge   - Consider OT consult to assist with ADL evaluation and planning for discharge  - Provide patient education as appropriate  Outcome: Progressing  Goal: Maintains/Returns to pre admission functional level  Description: INTERVENTIONS:  - Perform BMAT or MOVE assessment daily.   - Set and communicate daily mobility goal to care team and patient/family/caregiver.    - Collaborate with rehabilitation services on mobility goals if consulted  - Perform Range of Motion  times a day. - Reposition patient every  hours.   - Dangle patient  times a day  - Stand patient  times a day  - Ambulate patient  times a day  - Out of bed to chair  times a day   - Out of bed for meals  times a day  - Out of bed for toileting  - Record patient progress and toleration of activity level   Outcome: Progressing     Problem: Prexisting or High Potential for Compromised Skin Integrity  Goal: Skin integrity is maintained or improved  Description: INTERVENTIONS:  - Identify patients at risk for skin breakdown  - Assess and monitor skin integrity  - Assess and monitor nutrition and hydration status  - Monitor labs   - Assess for incontinence   - Turn and reposition patient  - Assist with mobility/ambulation  - Relieve pressure over bony prominences  - Avoid friction and shearing  - Provide appropriate hygiene as needed including keeping skin clean and dry  - Evaluate need for skin moisturizer/barrier cream  - Collaborate with interdisciplinary team   - Patient/family teaching  - Consider wound care consult   Outcome: Progressing     Problem: RESPIRATORY - ADULT  Goal: Achieves optimal ventilation and oxygenation  Description: INTERVENTIONS:  - Assess for changes in respiratory status  - Assess for changes in mentation and behavior  - Position to facilitate oxygenation and minimize respiratory effort  - Oxygen administered by appropriate delivery if ordered  - Initiate smoking cessation education as indicated  - Encourage broncho-pulmonary hygiene including cough, deep breathe, Incentive Spirometry  - Assess the need for suctioning and aspirate as needed  - Assess and instruct to report SOB or any respiratory difficulty  - Respiratory Therapy support as indicated  Outcome: Progressing     Problem: GENITOURINARY - ADULT  Goal: Absence of urinary retention  Description: INTERVENTIONS:  - Assess patient’s ability to void and empty bladder  - Monitor I/O  - Bladder scan as needed  - Discuss with physician/AP medications to alleviate retention as needed  - Discuss catheterization for long term situations as appropriate  Outcome: Progressing     Problem: METABOLIC, FLUID AND ELECTROLYTES - ADULT  Goal: Electrolytes maintained within normal limits  Description: INTERVENTIONS:  - Monitor labs and assess patient for signs and symptoms of electrolyte imbalances  - Administer electrolyte replacement as ordered  - Monitor response to electrolyte replacements, including repeat lab results as appropriate  - Instruct patient on fluid and nutrition as appropriate  Outcome: Progressing  Goal: Fluid balance maintained  Description: INTERVENTIONS:  - Monitor labs   - Monitor I/O and WT  - Instruct patient on fluid and nutrition as appropriate  - Assess for signs & symptoms of volume excess or deficit  Outcome: Progressing  Goal: Glucose maintained within target range  Description: INTERVENTIONS:  - Monitor Blood Glucose as ordered  - Assess for signs and symptoms of hyperglycemia and hypoglycemia  - Administer ordered medications to maintain glucose within target range  - Assess nutritional intake and initiate nutrition service referral as needed  Outcome: Progressing     Problem: SKIN/TISSUE INTEGRITY - ADULT  Goal: Skin Integrity remains intact(Skin Breakdown Prevention)  Description: Assess:  -Perform Jason assessment every   -Clean and moisturize skin every   -Inspect skin when repositioning, toileting, and assisting with ADLS  -Assess under medical devices such as  every   -Assess extremities for adequate circulation and sensation     Bed Management:  -Have minimal linens on bed & keep smooth, unwrinkled  -Change linens as needed when moist or perspiring  -Avoid sitting or lying in one position for more than  hours while in bed  -Keep HOB at degrees     Toileting:  -Offer bedside commode  -Assess for incontinence every   -Use incontinent care products after each incontinent episode such as Activity:  -Mobilize patient  times a day  -Encourage activity and walks on unit  -Encourage or provide ROM exercises   -Turn and reposition patient every  Hours  -Use appropriate equipment to lift or move patient in bed  -Instruct/ Assist with weight shifting every  when out of bed in chair  -Consider limitation of chair time  hour intervals    Skin Care:  -Avoid use of baby powder, tape, friction and shearing, hot water or constrictive clothing  -Relieve pressure over bony prominences using   -Do not massage red bony areas    Next Steps:  -Teach patient strategies to minimize risks such as    -Consider consults to  interdisciplinary teams such as   Outcome: Progressing  Goal: Incision(s), wounds(s) or drain site(s) healing without S/S of infection  Description: INTERVENTIONS  - Assess and document dressing, incision, wound bed, drain sites and surrounding tissue  - Provide patient and family education  - Perform skin care/dressing changes every  Outcome: Progressing     Problem: HEMATOLOGIC - ADULT  Goal: Maintains hematologic stability  Description: INTERVENTIONS  - Assess for signs and symptoms of bleeding or hemorrhage  - Monitor labs  - Administer supportive blood products/factors as ordered and appropriate  Outcome: Progressing

## 2023-08-22 NOTE — PROGRESS NOTES
[address]  Progress Note  Name: Lennie Garza  MRN: [id number]  Unit/Bed#: -01 I Date of Admission: 8/16/2023   Date of Service: 8/22/2023 I Hospital Day: 6    Assessment/Plan   Dehydration  Assessment & Plan  Dehydration, POA, due to decreased oral intake evidenced by ROMAN (Cr 1.78> 1.59> 1.44) requiring IVF, holding of Ramipril and monitoring of BMP. Herpes zoster with complication  Assessment & Plan  Rash noted on the right lower extremity and right low back  Photos uploaded to media by wound and nursing team    Valtrex 1000 mg BID - renal dosing  Will need images of lesions to ensure crusting is present (signifying resolution) prior to removing precautions    Memory loss  Assessment & Plan  · DIL concerned due to progressive memory loss. Getting lost driving since removed care from his resident as he was driving to [place] alone but other times lost  · If work up negative should follow up with PCP for further work up   · Valente #5 Gia Villasenor Final PCP WITH FAMILY  · Neuropsych consult for capacity and cognitive eval  · Patient has Capacity; mild cognitive impairment    Ambulatory dysfunction  Assessment & Plan  • PT/OT Eval and treat recommending post acute rehab services-patient agreeable to placement; plan for discharge to rehab on 8/23  · Patient came in with reported back pain. · CT lumbar spine obtained: No acute compression collapse of the vertebra Multilevel degenerative disc disease with facet joint disease and multilevel foraminal narrowing Moderate right foraminal narrowing at L5-S1  · Conservative measure PT/OT eval pain management   · Responding to lidoderm and low dose oxy.   AM-PAC Basic Mobility:  Basic Mobility Inpatient Raw Score: 13    -HL Achieved: 3: Sit at edge of bed  -HLM Goal: 4: Move to chair/commode  • Encourage appropriate mobility to achieve and improve upon [facility] Goals as noted    Hyperlipemia, mixed  Assessment & Plan  · Continue Statin    Acquired hypothyroidism  Assessment & Plan  Lab Results   Component Value Date    FHV6ZUPDMQSS 16.341 (H) 08/18/2023    FREET4 0.95 08/18/2023      · Continue home levothyroxine  · Will increase home dose levothyroxine    Primary hypertension  Assessment & Plan  Blood Pressure: 161/77    Plan:  • Resume home med  • Monitor blood pressure        Coronary artery disease involving native coronary artery of native heart without angina pectoris  Assessment & Plan  · Continue beta-blocker; not in acute chest pain at this time    * ROMAN (acute kidney injury) [facility])  Assessment & Plan  Recent Labs     08/21/23  0439 08/22/23  0505   CREATININE 1.32* 1.32*   EGFR 46 46     Estimated Creatinine Clearance: 31.1 mL/min (A) (by C-G formula based on SCr of 1.32 mg/dL (H)). • POA; Further chart reviewed revealed Cr. Baseline likely closely to 1.3  • Likely prerenal leukopenia secondary to decreased oral intake    Plan:   • Monitor BMP daily and observe for downward trend of creatinine  • Gentle IV fluid hydration  • Avoid hypoperfusion of the kidneys, minimize nephrotoxins  · Hold Lisinopril;  Renal Dose Valtrex               VTE Pharmacologic Prophylaxis: VTE Score: 3 Moderate Risk (Score 3-4) - Pharmacological DVT Prophylaxis Ordered: heparin. Patient Centered Rounds: I performed bedside rounds with nursing staff today. Discussions with Specialists or Other Care Team Provider: KATIE    Education and Discussions with Family / Patient: PT. Total Time Spent on Date of Encounter in care of patient: 35 minutes This time was spent on one or more of the following: performing physical exam; counseling and coordination of care; obtaining or reviewing history; documenting in the medical record; reviewing/ordering tests, medications or procedures; communicating with other healthcare professionals and discussing with patient's family/caregivers.     Current Length of Stay: 6 day(s)  Current Patient Status: Inpatient   Certification Statement: The patient will continue to require additional inpatient hospital stay due to placement  Discharge Plan: Anticipate discharge tomorrow to rehab facility. Code Status: Level 1 - Full Code    Subjective:   Pt has no acute complaints    Objective:     Vitals:   Temp (24hrs), Av.4 °F (36.9 °C), Min:97.7 °F (36.5 °C), Max:99 °F (37.2 °C)    Temp:  [97.7 °F (36.5 °C)-99 °F (37.2 °C)] 97.7 °F (36.5 °C)  HR:  [78-88] 78  Resp:  [15-17] 15  BP: (160-161)/(66-77) 161/77  SpO2:  [97 %-100 %] 97 %  Body mass index is 23.59 kg/m². Input and Output Summary (last 24 hours): Intake/Output Summary (Last 24 hours) at 2023 1526  Last data filed at 2023 1500  Gross per 24 hour   Intake 600 ml   Output 100 ml   Net 500 ml       Physical Exam:   Physical Exam  Vitals reviewed. Constitutional:       General: He is not in acute distress. Appearance: He is well-developed. He is not diaphoretic. HENT:      Head: Normocephalic and atraumatic. Mouth/Throat:      Pharynx: No oropharyngeal exudate. Eyes:      General: No scleral icterus. Extraocular Movements: Extraocular movements intact. Conjunctiva/sclera: Conjunctivae normal.   Neck:      Vascular: No JVD. Trachea: No tracheal deviation. Cardiovascular:      Rate and Rhythm: Normal rate and regular rhythm. Heart sounds: No murmur heard. No friction rub. No gallop. Pulmonary:      Effort: Pulmonary effort is normal. No respiratory distress. Breath sounds: No stridor. No wheezing. Abdominal:      General: There is no distension. Palpations: Abdomen is soft. There is no mass. Tenderness: There is no abdominal tenderness. There is no right CVA tenderness or left CVA tenderness. Musculoskeletal:         General: No tenderness. Right lower leg: No edema. Left lower leg: No edema. Skin:     General: Skin is warm. Coloration: Skin is pale.       Findings: No erythema. Neurological:      Mental Status: He is alert. Psychiatric:         Behavior: Behavior normal.         Thought Content: Thought content normal.         Additional Data:     Labs:  Results from last 7 days   Lab Units 08/22/23  0505   WBC Thousand/uL 8.13   HEMOGLOBIN g/dL 10.3*   HEMATOCRIT % 31.5*   PLATELETS Thousands/uL 180   NEUTROS PCT % 72   LYMPHS PCT % 13*   MONOS PCT % 13*   EOS PCT % 1     Results from last 7 days   Lab Units 08/22/23  0505   SODIUM mmol/L 134*   POTASSIUM mmol/L 4.1   CHLORIDE mmol/L 106   CO2 mmol/L 22   BUN mg/dL 30*   CREATININE mg/dL 1.32*   ANION GAP mmol/L 6   CALCIUM mg/dL 8.5   ALBUMIN g/dL 3.4*   TOTAL BILIRUBIN mg/dL 0.59   ALK PHOS U/L 66   ALT U/L 9   AST U/L 16   GLUCOSE RANDOM mg/dL 132                       Lines/Drains:  Invasive Devices     Peripheral Intravenous Line  Duration           Peripheral IV 08/21/23 Left;Ventral (anterior) Forearm 1 day                      Imaging: No pertinent imaging reviewed.     Recent Cultures (last 7 days):         Last 24 Hours Medication List:   Current Facility-Administered Medications   Medication Dose Route Frequency Provider Last Rate   • acetaminophen  650 mg Oral Q6H PRN Phil Contrerasan, DO     • aspirin  81 mg Oral Daily Phil Hercules, DO     • atorvastatin  40 mg Oral Daily [employer], DO     • cyanocobalamin  100 mcg Oral Daily Phil Hercules, DO     • heparin (porcine)  5,000 Units Subcutaneous Q8H [address] Hilaria Mckenzie PA-C     • hydrALAZINE  10 mg Intravenous Q6H PRN Phil Hercules, DO     • HYDROmorphone  0.2 mg Intravenous Q4H PRN Phil Hercules, DO     • labetalol  10 mg Intravenous Q6H PRN Phil Contrerasan, DO     • levothyroxine  88 mcg Oral Daily Phil Hercules, DO     • lidocaine  1 patch Topical Daily Phil Hercules, DO     • melatonin  3 mg Oral HS PRN Noemi Rayo PA-C     • metoprolol succinate  25 mg Oral Daily Phil Hercules, DO     • multi-electrolyte  75 mL/hr Intravenous Continuous Phil Hercules DO 75 mL/hr (08/21/23 2306)   • nicotine  1 patch Transdermal Daily Phil Hercules DO     • nitroglycerin  0.4 mg Sublingual Q5 Min PRN Phil Hercules DO     • ondansetron  4 mg Intravenous Q6H PRN Phil Hercules, DO     • oxyCODONE  5 mg Oral Q4H PRN Phil Hercules, DO     • oxyCODONE  2.5 mg Oral Q4H PRN Phil Diomedes, DO     • senna-docusate sodium  1 tablet Oral HS Phil Hercules DO     • tamsulosin  0.4 mg Oral Daily [employer], DO     • valACYclovir  1,000 mg Oral Q12H [address] Phil Hercules DO          Today, Patient Was Seen By: Rio Hall DO    **Please Note: This note may have been constructed using a voice recognition system. **

## 2023-08-22 NOTE — ASSESSMENT & PLAN NOTE
Rash noted on the right lower extremity and right low back  Photos uploaded to media by wound and nursing team    Valtrex 1000 mg BID - renal dosing  Will need images of lesions to ensure crusting is present (signifying resolution) prior to removing precautions Zuri Dukes DO, saw and evaluated the patient  I have discussed the patient with the resident/non-physician practitioner and agree with the resident's/non-physician practitioner's findings, Plan of Care, and MDM as documented in the resident's/non-physician practitioner's note, except where noted  All available labs and Radiology studies were reviewed  I was present for key portions of any procedure(s) performed by the resident/non-physician practitioner and I was immediately available to provide assistance  At this point I agree with the current assessment done in the Emergency Department  I have conducted an independent evaluation of this patient a history and physical is as follows:       80-year-old female with a history of hyper thyroidism who no longer is on methimazole after  Pregnancy in February  Presents for palpitations  Sudden onset 2 hours ago  Otherwise feeling well no chest pain or shortness of breath no other associated symptoms or modifying factors  She is tachycardic to 150 on exam in no acute distress  Has some slight tremors  Assessment plan:  Tachycardia  On clear if this is related to hyperthyroidism because her hyperthyroidism was only in pregnancy  Will do a workup for other causes of tachycardia although doubt primary arrhythmia        Critical Care Time  Procedures

## 2023-08-22 NOTE — ASSESSMENT & PLAN NOTE
• PT/OT Eval and treat recommending post acute rehab services-patient agreeable to placement; plan for discharge to rehab on 8/23  · Patient came in with reported back pain. · CT lumbar spine obtained: No acute compression collapse of the vertebra Multilevel degenerative disc disease with facet joint disease and multilevel foraminal narrowing Moderate right foraminal narrowing at L5-S1  · Conservative measure PT/OT eval pain management   · Responding to lidoderm and low dose oxy.   AM-PAC Basic Mobility:  Basic Mobility Inpatient Raw Score: 13    -[facility] Achieved: 3: Sit at edge of bed  -[facility] Goal: 4: Move to chair/commode  • Encourage appropriate mobility to achieve and improve upon [facility] Goals as noted

## 2023-08-22 NOTE — ASSESSMENT & PLAN NOTE
Lab Results   Component Value Date    [id number] 16.341 (H) 08/18/2023    FREET4 0.95 08/18/2023      · Continue home levothyroxine  · Will increase home dose levothyroxine

## 2023-08-22 NOTE — CASE MANAGEMENT
Case Management Discharge Planning Note    Patient name Nki Brown  Location [address] [facility] 427/-[id number] MRN [id number]  : 1930 Date 2023       Current Admission Date: 2023  Current Admission Diagnosis:ROMAN (acute kidney injury) [facility])   Patient Active Problem List    Diagnosis Date Noted   • Dehydration 2023   • Herpes zoster with complication    • Memory loss 2023   • Ambulatory dysfunction 2023   • ROMAN (acute kidney injury) ([address]) 2023   • History of prostate cancer 2019   • Mitral valve disorder 2018   • Hyperlipemia, mixed 2018   • Primary hypertension 2018   • Acquired hypothyroidism 2018   • Coronary artery disease involving native coronary artery of native heart without angina pectoris 04/15/2018      LOS (days): 6  Geometric Mean LOS (GMLOS) (days): 3.10  Days to GMLOS:-2.9     OBJECTIVE:  Risk of Unplanned Readmission Score: 13.81         Current admission status: Inpatient   Preferred Pharmacy:   [facility] [address] [place] [id number]  Phone: [phone] Fax: [phone] for YUN Sinclair Diane [place]xley, [address] [place] [address],[address]  [place] [address]  Phone: [phone] Fax: [phone]    Primary Care Provider: Muriel Evans PA-C    Primary Insurance: MEDICARE  Secondary Insurance: COMMERCIAL MISCELLANEOUS    DISCHARGE DETAILS:  As per ANASTASIA, pt has an anticipated 24hrs dc. Pt will be dc to [facility] once medically cleared. CM continue to follow.

## 2023-08-22 NOTE — ASSESSMENT & PLAN NOTE
· DIL concerned due to progressive memory loss.  Getting lost driving since removed care from his resident as he was driving to [email or web address] alone but other times lost  · If work up negative should follow up with PCP for further work up   · Valente #5 Gia Villasenor Final PCP WITH FAMILY  · Neuropsych consult for capacity and cognitive eval  · Patient has Capacity; mild cognitive impairment

## 2023-08-23 LAB
ANION GAP SERPL CALCULATED.3IONS-SCNC: 5 MMOL/L
BUN SERPL-MCNC: 24 MG/DL (ref 5–25)
CALCIUM SERPL-MCNC: 8.7 MG/DL (ref 8.4–10.2)
CHLORIDE SERPL-SCNC: 105 MMOL/L (ref 96–108)
CO2 SERPL-SCNC: 24 MMOL/L (ref 21–32)
CREAT SERPL-MCNC: 1.15 MG/DL (ref 0.6–1.3)
ERYTHROCYTE [DISTWIDTH] IN BLOOD BY AUTOMATED COUNT: 13.7 % (ref 11.6–15.1)
GFR SERPL CREATININE-BSD FRML MDRD: 54 ML/MIN/1.73SQ M
GLUCOSE SERPL-MCNC: 94 MG/DL (ref 65–140)
HCT VFR BLD AUTO: 33 % (ref 36.5–49.3)
HGB BLD-MCNC: 10.5 G/DL (ref 12–17)
MAGNESIUM SERPL-MCNC: 1.9 MG/DL (ref 1.9–2.7)
MCH RBC QN AUTO: 30.7 PG (ref 26.8–34.3)
MCHC RBC AUTO-ENTMCNC: 31.8 G/DL (ref 31.4–37.4)
MCV RBC AUTO: 97 FL (ref 82–98)
PLATELET # BLD AUTO: 216 THOUSANDS/UL (ref 149–390)
PMV BLD AUTO: 9.9 FL (ref 8.9–12.7)
POTASSIUM SERPL-SCNC: 3.9 MMOL/L (ref 3.5–5.3)
RBC # BLD AUTO: 3.42 MILLION/UL (ref 3.88–5.62)
SODIUM SERPL-SCNC: 134 MMOL/L (ref 135–147)
WBC # BLD AUTO: 6.77 THOUSAND/UL (ref 4.31–10.16)

## 2023-08-23 PROCEDURE — 99239 HOSP IP/OBS DSCHRG MGMT >30: CPT | Performed by: STUDENT IN AN ORGANIZED HEALTH CARE EDUCATION/TRAINING PROGRAM

## 2023-08-23 PROCEDURE — 83735 ASSAY OF MAGNESIUM: CPT | Performed by: STUDENT IN AN ORGANIZED HEALTH CARE EDUCATION/TRAINING PROGRAM

## 2023-08-23 PROCEDURE — 85027 COMPLETE CBC AUTOMATED: CPT | Performed by: STUDENT IN AN ORGANIZED HEALTH CARE EDUCATION/TRAINING PROGRAM

## 2023-08-23 PROCEDURE — 80048 BASIC METABOLIC PNL TOTAL CA: CPT | Performed by: STUDENT IN AN ORGANIZED HEALTH CARE EDUCATION/TRAINING PROGRAM

## 2023-08-23 RX ORDER — LANOLIN ALCOHOL/MO/W.PET/CERES
800 CREAM (GRAM) TOPICAL ONCE
Status: COMPLETED | OUTPATIENT
Start: 2023-08-23 | End: 2023-08-23

## 2023-08-23 RX ORDER — LIDOCAINE 50 MG/G
1 PATCH TOPICAL DAILY
Refills: 0
Start: 2023-08-24

## 2023-08-23 RX ORDER — NICOTINE 21 MG/24HR
1 PATCH, TRANSDERMAL 24 HOURS TRANSDERMAL DAILY
Qty: 28 PATCH | Refills: 0
Start: 2023-08-24

## 2023-08-23 RX ORDER — AMOXICILLIN 250 MG
1 CAPSULE ORAL
Refills: 0
Start: 2023-08-23

## 2023-08-23 RX ORDER — VALACYCLOVIR HYDROCHLORIDE 1 G/1
1000 TABLET, FILM COATED ORAL EVERY 12 HOURS SCHEDULED
Qty: 4 TABLET | Refills: 0
Start: 2023-08-23 | End: 2023-08-31

## 2023-08-23 RX ADMIN — HEPARIN SODIUM 5000 UNITS: 5000 INJECTION INTRAVENOUS; SUBCUTANEOUS at 05:04

## 2023-08-23 RX ADMIN — VITAM B12 100 MCG: 100 TAB at 09:49

## 2023-08-23 RX ADMIN — LEVOTHYROXINE SODIUM 88 MCG: 88 TABLET ORAL at 05:03

## 2023-08-23 RX ADMIN — VALACYCLOVIR HYDROCHLORIDE 1000 MG: 500 TABLET, FILM COATED ORAL at 09:49

## 2023-08-23 RX ADMIN — ASPIRIN 81 MG: 81 TABLET, COATED ORAL at 09:49

## 2023-08-23 RX ADMIN — METOPROLOL SUCCINATE 25 MG: 25 TABLET, EXTENDED RELEASE ORAL at 09:49

## 2023-08-23 RX ADMIN — Medication 10 MG: at 02:40

## 2023-08-23 RX ADMIN — LIDOCAINE 5% 1 PATCH: 700 PATCH TOPICAL at 09:48

## 2023-08-23 RX ADMIN — Medication 800 MG: at 09:48

## 2023-08-23 NOTE — CASE MANAGEMENT
Case Management Discharge Planning Note    Patient name Aquiles Nunn  [facility] [address] [facility] [id number]  : 1930 Date 2023       Current Admission Date: 2023  Current Admission Diagnosis:ROMAN (acute kidney injury) [facility])   Patient Active Problem List    Diagnosis Date Noted   • Dehydration 2023   • Herpes zoster with complication    • Memory loss 2023   • Ambulatory dysfunction 2023   • ROMAN (acute kidney injury) ([address]) 2023   • History of prostate cancer 2019   • Mitral valve disorder 2018   • Hyperlipemia, mixed 2018   • Primary hypertension 2018   • Acquired hypothyroidism 2018   • Coronary artery disease involving native coronary artery of native heart without angina pectoris 04/15/2018      LOS (days): 7  Geometric Mean LOS (GMLOS) (days): 3.10  Days to GMLOS:-3.7     OBJECTIVE:  Risk of Unplanned Readmission Score: 11.4         Current admission status: Inpatient   Preferred Pharmacy:   [address] [place] [id number]  Phone: [phone] Fax: [phone] for YUN Shelby,  [facility] - [address] [place] [address] [id number] [place] [address]  Phone: [phone] Fax: [phone]    Primary Care Provider: Marlon Riojas PA-C    Primary Insurance: MEDICARE  Secondary Insurance: COMMERCIAL MISCELLANEOUS    DISCHARGE DETAILS:    Discharge planning discussed with[de-identified] Pt and his son Meghan Saldivar over the phone  Freedom of Choice: Yes  Comments - Freedom of Choice: As per SLIM, pt is being discharged today to [facility] skilled nursing and rehab. CM called and spoke with pt son Meghan Saldivar at [phone] and made him aware and was in agreement. Esperanza Mclean was notifed on pt dc. CM contiues to follow.   CM contacted family/caregiver?: Yes  Were Treatment Team discharge recommendations reviewed with patient/caregiver?: Yes  Did patient/caregiver verbalize understanding of patient care needs?: Yes  Were patient/caregiver advised of the risks associated with not following Treatment Team discharge recommendations?: Yes    Contacts  Patient Contacts: Alfie Bahena  Relationship to Patient[de-identified] Family  Contact Method: Phone  Phone Number: [phone]  Reason/Outcome: Continuity of Care, Emergency Contact, Discharge Planning              Would you like to participate in our [address] service program?  : No - Declined    Treatment Team Recommendation: Short Term Rehab     Transport at Discharge : S Ambulance  Dispatcher Contacted: Yes  Number/Name of Dispatcher: AIMEE     ETA of Transport (Date): 08/23/23  ETA of Transport (Time): [address] Name, [address] : [address] [place] [address] [facility] Facility/Agency Phone Number: Phone: [phone]  Facility/Agency Fax Number: Fax: [phone]

## 2023-08-23 NOTE — DISCHARGE SUMMARY
[address]  Discharge Summary  Name: Arabella Salazar  MRN: [id number]  Unit/Bed#: -01 I Date of Admission: 8/16/2023   Date of Service: 8/23/2023 I Hospital Day: 7    Assessment/Plan   Dehydration  Assessment & Plan  Dehydration, POA, due to decreased oral intake evidenced by ROMAN (Cr 1.78> 1.59> 1.44) requiring IVF, holding of Ramipril and monitoring of BMP. Herpes zoster with complication  Assessment & Plan  Rash noted on the right lower extremity and right low back  Photos uploaded to media by wound and nursing team  improving    Valtrex 1000 mg BID - renal dosing    Memory loss  Assessment & Plan  · DIL concerned due to progressive memory loss. Getting lost driving since removed care from his resident as he was driving to [place] alone but other times lost  · If work up negative should follow up with PCP for further work up   · Valente #5 Gia Villasenor Final PCP WITH FAMILY  · Neuropsych consult for capacity and cognitive eval  · Patient has Capacity; mild cognitive impairment    Ambulatory dysfunction  Assessment & Plan  • PT/OT Eval and treat recommending post acute rehab services-patient agreeable to placement; plan for discharge to rehab on 8/23  · Patient came in with reported back pain. · CT lumbar spine obtained: No acute compression collapse of the vertebra Multilevel degenerative disc disease with facet joint disease and multilevel foraminal narrowing Moderate right foraminal narrowing at L5-S1  · Conservative measure PT/OT eval pain management   · Responding to lidoderm and low dose oxy.   AM-PAC Basic Mobility:  Basic Mobility Inpatient Raw Score: 13    JH-HLM Achieved: 6: Walk 10 steps or more  JH-HLM Goal: 4: Move to chair/commode  • Encourage appropriate mobility to achieve and improve upon [facility] Goals as noted    Hyperlipemia, mixed  Assessment & Plan  · Continue Statin    Acquired hypothyroidism  Assessment & Plan  Lab Results   Component Value Date [id number] 16.341 (H) 08/18/2023    FREET4 0.95 08/18/2023      · Continue home levothyroxine  · Per son pt was not taking home levothyroxine  · Will continue at this dose, repeat TSH in 4-6 weeks outpatient    Primary hypertension  Assessment & Plan  Blood Pressure: 127/91    Plan:  • Resume home med  • Monitor blood pressure        Coronary artery disease involving native coronary artery of native heart without angina pectoris  Assessment & Plan  · Continue beta-blocker; not in acute chest pain at this time    * ROMAN (acute kidney injury) [facility])  Assessment & Plan  Recent Labs     08/21/23  0439 08/22/23  0505 08/23/23  0500   CREATININE 1.32* 1.32* 1.15   EGFR 46 46 54     Estimated Creatinine Clearance: 35.7 mL/min (by C-G formula based on SCr of 1.15 mg/dL). • POA; Further chart reviewed revealed Cr. Baseline likely closely to 1.3  • Likely prerenal leukopenia secondary to decreased oral intake    Plan:   • Monitor BMP daily and observe for downward trend of creatinine  • Gentle IV fluid hydration  • Avoid hypoperfusion of the kidneys, minimize nephrotoxins  · Hold Lisinopril;  Renal Dose Valtrex             Medical Problems     Resolved Problems  Date Reviewed: 8/23/2023   None       Discharging Physician / Practitioner: Priscila Cali DO  PCP: Quynh Eli PA-C  Admission Date:   Admission Orders (From admission, onward)     Ordered        08/16/23 1349  INPATIENT ADMISSION  Once                      Discharge Date: 08/23/23    Consultations During Hospital Stay:  · Psych    Procedures Performed:   · N/AA    Significant Findings / Test Results:   · See above    Incidental Findings:   · See above       Test Results Pending at Discharge (will require follow up):   · n/a     Outpatient Tests Requested:  · Bmp 1 week    Complications:  none    Reason for Admission:     Hospital Course:   Booker Fleming is a 80 y.o. male patient who originally presented to the hospital on 8/16/2023 due to fall. Work-up negative for any acute compression fracture. No noncompliance with medication regimen in past.  PT/OT consulted recommending inpatient rehab. ROMAN improved with IV fluids. Patient had extended stay due to placement for rehab. Discussed with patient's son, even though patient lives alone patient son is planning on him moving in with him. Patient is accepted to rehab facility, stable for discharge. Please see above list of diagnoses and related plan for additional information. Condition at Discharge: poor    Discharge Day Visit / Exam:   Subjective:  Pt has no acute complaints at this time. Vitals: Blood Pressure: 127/91 (08/23/23 0715)  Pulse: 84 (08/23/23 0715)  Temperature: 98.1 °F (36.7 °C) (08/23/23 0715)  Temp Source: Oral (08/16/23 1857)  Respirations: 16 (08/23/23 0715)  Height: 5' 5" (165.1 cm) (08/16/23 1857)  Weight - Scale: 67.9 kg (149 lb 11.1 oz) (08/23/23 0553)  SpO2: 97 % (08/23/23 0715)  Exam:   Physical Exam  Constitutional:       General: He is not in acute distress. Appearance: He is well-developed. He is not diaphoretic. HENT:      Head: Normocephalic and atraumatic. Mouth/Throat:      Pharynx: No oropharyngeal exudate. Eyes:      General: No scleral icterus. Extraocular Movements: Extraocular movements intact. Conjunctiva/sclera: Conjunctivae normal.   Neck:      Vascular: No JVD. Trachea: No tracheal deviation. Cardiovascular:      Rate and Rhythm: Normal rate and regular rhythm. Heart sounds: No murmur heard. No friction rub. No gallop. Pulmonary:      Effort: Pulmonary effort is normal. No respiratory distress. Breath sounds: No stridor. No wheezing. Abdominal:      General: There is no distension. Palpations: Abdomen is soft. There is no mass. Tenderness: There is no abdominal tenderness. There is no right CVA tenderness or left CVA tenderness. Musculoskeletal:         General: No tenderness.       Right lower leg: No edema. Left lower leg: No edema. Comments: Generalized weakness   Skin:     General: Skin is warm. Coloration: Skin is pale. Findings: No erythema. Neurological:      Mental Status: He is alert. Mental status is at baseline. Psychiatric:         Thought Content: Thought content normal.          Discussion with Family: Updated  (son) via phone. Discharge instructions/Information to patient and family:   See after visit summary for information provided to patient and family. Provisions for Follow-Up Care:  See after visit summary for information related to follow-up care and any pertinent home health orders. Disposition:   Other [facility]    Planned Readmission: none     Discharge Statement:  I spent 41 minutes discharging the patient. This time was spent on the day of discharge. I had direct contact with the patient on the day of discharge. Greater than 50% of the total time was spent examining patient, answering all patient questions, arranging and discussing plan of care with patient as well as directly providing post-discharge instructions. Additional time then spent on discharge activities. Discharge Medications:  See after visit summary for reconciled discharge medications provided to patient and/or family.       **Please Note: This note may have been constructed using a voice recognition system**

## 2023-08-23 NOTE — ASSESSMENT & PLAN NOTE
Recent Labs     08/21/23  0439 08/22/23  0505 08/23/23  0500   CREATININE 1.32* 1.32* 1.15   EGFR 46 46 54     Estimated Creatinine Clearance: 35.7 mL/min (by C-G formula based on SCr of 1.15 mg/dL). • POA; Further chart reviewed revealed Cr.  Baseline likely closely to 1.3  • Likely prerenal leukopenia secondary to decreased oral intake    Plan:   • Monitor BMP daily and observe for downward trend of creatinine  • Gentle IV fluid hydration  • Avoid hypoperfusion of the kidneys, minimize nephrotoxins  · Hold Lisinopril;  Renal Dose Valtrex

## 2023-08-23 NOTE — ASSESSMENT & PLAN NOTE
• PT/OT Eval and treat recommending post acute rehab services-patient agreeable to placement; plan for discharge to rehab on 8/23  · Patient came in with reported back pain. · CT lumbar spine obtained: No acute compression collapse of the vertebra Multilevel degenerative disc disease with facet joint disease and multilevel foraminal narrowing Moderate right foraminal narrowing at L5-S1  · Conservative measure PT/OT eval pain management   · Responding to lidoderm and low dose oxy.   AM-PAC Basic Mobility:  Basic Mobility Inpatient Raw Score: 13    JH-HLM Achieved: 6: Walk 10 steps or more  JH-HLM Goal: 4: Move to chair/commode  • Encourage appropriate mobility to achieve and improve upon [facility] Goals as noted

## 2023-08-23 NOTE — ASSESSMENT & PLAN NOTE
Lab Results   Component Value Date    QMV7VNJVGFSA 16.341 (H) 08/18/2023    FREET4 0.95 08/18/2023      · Continue home levothyroxine  · Per son pt was not taking home levothyroxine  · Will continue at this dose, repeat TSH in 4-6 weeks outpatient

## 2023-08-23 NOTE — ASSESSMENT & PLAN NOTE
Rash noted on the right lower extremity and right low back  Photos uploaded to media by wound and nursing team  improving    Valtrex 1000 mg BID - renal dosing

## 2023-08-23 NOTE — ASSESSMENT & PLAN NOTE
· DIL concerned due to progressive memory loss.  Getting lost driving since removed care from his resident as he was driving to [employer] alone but other times lost  · If work up negative should follow up with PCP for further work up   · Valente #5 Gia Villasenor Final PCP WITH FAMILY  · Neuropsych consult for capacity and cognitive eval  · Patient has Capacity; mild cognitive impairment

## 2023-08-23 NOTE — PLAN OF CARE
Problem: PAIN - ADULT  Goal: Verbalizes/displays adequate comfort level or baseline comfort level  Description: Interventions:  - Encourage patient to monitor pain and request assistance  - Assess pain using appropriate pain scale  - Administer analgesics based on type and severity of pain and evaluate response  - Implement non-pharmacological measures as appropriate and evaluate response  - Consider cultural and social influences on pain and pain management  - Notify physician/advanced practitioner if interventions unsuccessful or patient reports new pain  Outcome: Progressing     Problem: INFECTION - ADULT  Goal: Absence or prevention of progression during hospitalization  Description: INTERVENTIONS:  - Assess and monitor for signs and symptoms of infection  - Monitor lab/diagnostic results  - Monitor all insertion sites, i.e. indwelling lines, tubes, and drains  - Monitor endotracheal if appropriate and nasal secretions for changes in amount and color  - [place] appropriate cooling/warming therapies per order  - Administer medications as ordered  - Instruct and encourage patient and family to use good hand hygiene technique  - Identify and instruct in appropriate isolation precautions for identified infection/condition  Outcome: Progressing     Problem: SAFETY ADULT  Goal: Patient will remain free of falls  Description: INTERVENTIONS:  - Educate patient/family on patient safety including physical limitations  - Instruct patient to call for assistance with activity   - Consult OT/PT to assist with strengthening/mobility   - Keep Call bell within reach  - Keep bed low and locked with side rails adjusted as appropriate  - Keep care items and personal belongings within reach  - Initiate and maintain comfort rounds  - Make Fall Risk Sign visible to staff  - Offer Toileting every 2 Hours, in advance of need  - Initiate/Maintain bed alarm  - Obtain necessary fall risk management equipment: call bell within reach   - Apply yellow socks and bracelet for high fall risk patients  - Consider moving patient to room near nurses station  Outcome: Progressing

## 2023-08-24 ENCOUNTER — NURSING HOME VISIT (OUTPATIENT)
Dept: GERIATRICS | Facility: OTHER | Age: 88
End: 2023-08-24
Payer: MEDICARE

## 2023-08-24 VITALS
BODY MASS INDEX: 24.94 KG/M2 | SYSTOLIC BLOOD PRESSURE: 116 MMHG | RESPIRATION RATE: 17 BRPM | DIASTOLIC BLOOD PRESSURE: 81 MMHG | WEIGHT: 149.69 LBS | TEMPERATURE: 98.2 F | HEART RATE: 101 BPM | OXYGEN SATURATION: 93 % | HEIGHT: 65 IN

## 2023-08-24 VITALS
BODY MASS INDEX: 24 KG/M2 | RESPIRATION RATE: 18 BRPM | SYSTOLIC BLOOD PRESSURE: 127 MMHG | DIASTOLIC BLOOD PRESSURE: 79 MMHG | WEIGHT: 144.2 LBS | TEMPERATURE: 97.5 F | OXYGEN SATURATION: 97 % | HEART RATE: 75 BPM

## 2023-08-24 DIAGNOSIS — N17.9 AKI (ACUTE KIDNEY INJURY) (HCC): Primary | ICD-10-CM

## 2023-08-24 PROCEDURE — 99306 1ST NF CARE HIGH MDM 50: CPT | Performed by: INTERNAL MEDICINE

## 2023-08-24 NOTE — ASSESSMENT & PLAN NOTE
Pt presented to the hospital with creatinine of 1.7. Baseline creatinine is close to 1.3. Renal function improved with IV hydration. Patient was recently taken off of lisinopril. Follow-up repeat BMP.   Avoid hypotension and nephrotoxins

## 2023-08-24 NOTE — PROGRESS NOTES
[facility]  [address] [address] [facility], [address]  [id number]    Nursing Home Admission    NAME: Keyona Vargas  AGE: 80 y.o. SEX: male [phone]      Patient Location     [facility]    Patient’s care was coordinated with nursing facility staff. Recent vitals, labs and updated medications were reviewed on [facility] system of facility. Past Medical, surgical, social, medication and allergy history and patient’s previous records reviewed. Assessment/Plan:    ROMAN (acute kidney injury) ([address])  Pt presented to the hospital with creatinine of 1.7. Baseline creatinine is close to 1.3. Renal function improved with IV hydration. Patient was recently taken off of lisinopril. Follow-up repeat BMP. Avoid hypotension and nephrotoxins      Dehydration:  Patient presented to the hospital with creatinine of 1.78. ROMAN was suspected to be prerenal from decreased p.o. intake and lisinopril. Patient was taken off of lisinopril. Renal function improved with IV hydration. Recent creatinine was 1.4. Follow-up repeat BMP. Avoid hypotension and nephrotoxins . Herpes zoster with complication:  Patient was noted to have rash involving right lower back and right lower extremity consistent with varicella-zoster infection. He is currently      Memory loss:  Records family was concerned regarding patient's progressive memory loss. Patient was apparently getting lost driving. He was seen in consultation by neuropsych service for capacity and cognitive evaluation. Patient was deemed competent and diagnosed with mild cognitive impairment. He was recommended against driving for now. Ambulatory dysfunction:  History of recent fall.   Trauma work-up was negative  CT lumbar spine obtained: No acute compression collapse of the vertebra Multilevel degenerative disc disease with facet joint disease and multilevel foraminal narrowing Moderate right foraminal narrowing at L5-S1  Continue PT OT         Hyperlipemia, mixed:  Continue Statin     Acquired hypothyroidism:  Lab Results   Component Value Date    [id number] 16.341 (H) 08/18/2023     TSH was noted to be high at 16.34 recently. Per family patient was not taking levothyroxine at home. Levothyroxine was resumed at the hospital.  Recommend repeating TSH in 6 weeks     Primary hypertension:  Blood pressures stable on metoprolol 25 mg daily. Patient was recently taken off of lisinopril due to ROMAN        Coronary artery disease involving native coronary artery of native heart without angina pectoris:  Stable. No anginal symptoms. Continue metoprolol, atorvastatin, aspirin and as needed nitroglycerin         Chief Complaint     Ambulatory dysfunction, generalized weakness, recent fall    HPI       Patient is a 80 y.o. male with past medical history significant for CAD, hypothyroidism, hypertension and arthritis. Patient was hospitalized on 8/16/2023 after sustaining a fall at home. Work-up was negative for acute fracture. Patient was noted to be dehydrated with creatinine of 1.32. Renal function improved with IV hydration. During the hospital stay patient was noted to have rash on right lower extremity and lower back consistent with herpes zoster. He was started on Valtrex 1000 mg twice daily. Currently finishing antiviral therapy. Patient was additionally seen in consultation by neuropsych service for capacity and cognitive evaluation. He was diagnosed with mild cognitive impairment and deemed competent to make decisions. Patient was however recommended against driving until seen by PCP with family. Family was concerned about patient's progressive memory loss. Patient was subsequently discharged to [facility] rehab where he is being seen for posthospital admission.   At the time of my evaluation patient is doing okay       Past Medical History:   Diagnosis Date   • Arthritis    • Coronary artery disease    • Disease of thyroid gland    • Heart disease    • Hypertension        Past Surgical History:   Procedure Laterality Date   • CARDIAC SURGERY     • HERNIA REPAIR         Social History     Tobacco Use   Smoking Status Some Days   • Types: Pipe   Smokeless Tobacco Never          Family History   Problem Relation Age of Onset   • No Known Problems Mother    • No Known Problems Father         Allergies   Allergen Reactions   • Penicillins Swelling   • Keflex [Cephalexin]    • Prednisone Other (See Comments)     Pt states "i don't know, I break out"          Current Outpatient Medications:   •  aspirin (ECOTRIN LOW STRENGTH) 81 mg EC tablet, Take 81 mg by mouth daily, Disp: , Rfl:   •  atorvastatin (LIPITOR) 40 mg tablet, Take 1 tablet (40 mg total) by mouth daily with dinner, Disp: 90 tablet, Rfl: 3  •  cyanocobalamin 1000 MCG tablet, Take 100 mcg by mouth daily, Disp: , Rfl:   •  levothyroxine (SYNTHROID) 88 mcg tablet, Take 1 tablet (88 mcg total) by mouth daily, Disp: 90 tablet, Rfl: 3  •  lidocaine (LIDODERM) 5 %, Apply 1 patch topically over 12 hours daily Remove & Discard patch within 12 hours or as directed by MD Do not start before August 24, 2023., Disp: , Rfl: 0  •  metoprolol succinate (TOPROL-XL) 25 mg 24 hr tablet, Take 1 tablet (25 mg total) by mouth daily, Disp: 90 tablet, Rfl: 3  •  nicotine (NICODERM CQ) 21 mg/24 hr TD 24 hr patch, Place 1 patch on the skin over 24 hours daily Do not start before August 24, 2023., Disp: 28 patch, Rfl: 0  •  nitroglycerin (NITROSTAT) 0.4 mg SL tablet, Place 0.4 mg under the tongue every 5 (five) minutes as needed for chest pain, Disp: , Rfl:   •  senna-docusate sodium (SENOKOT S) 8.6-50 mg per tablet, Take 1 tablet by mouth daily at bedtime, Disp: , Rfl: 0  •  tamsulosin (FLOMAX) 0.4 mg, , Disp: , Rfl:   •  valACYclovir (VALTREX) 1,000 mg tablet, Take 1 tablet (1,000 mg total) by mouth every 12 (twelve) hours for 4 doses, Disp: 4 tablet, Rfl: 0  No current facility-administered medications for this visit. Updated list was reviewed in [address] [place] [address] system of facility. Vitals:    08/24/23 0856   BP: 127/79   Pulse: 75   Resp: 18   Temp: 97.5 °F (36.4 °C)   SpO2: 97%       Vital signs were reviewed in point click care    Review of Systems   Constitutional: Positive for fatigue. Negative for chills and fever. HENT: Negative for nosebleeds and rhinorrhea. Eyes: Negative for discharge and redness. Respiratory: Negative for cough, shortness of breath, wheezing and stridor. Cardiovascular: Negative for chest pain and leg swelling. Gastrointestinal: Negative for abdominal distention, abdominal pain, diarrhea and vomiting. Genitourinary: Negative for hematuria. Musculoskeletal: Positive for gait problem. Negative for arthralgias and back pain. Skin: Negative for pallor. Neurological: Positive for weakness (Generalized). Negative for tremors, seizures and syncope. Psychiatric/Behavioral: Positive for confusion (at times). Negative for agitation and behavioral problems. Physical Exam  Constitutional:       General: He is not in acute distress. Appearance: He is well-developed. He is not diaphoretic. HENT:      Head: Normocephalic and atraumatic. Eyes:      General: No scleral icterus. Right eye: No discharge. Left eye: No discharge. Cardiovascular:      Rate and Rhythm: Normal rate and regular rhythm. Pulmonary:      Breath sounds: No stridor. No wheezing. Abdominal:      Palpations: Abdomen is soft. Tenderness: There is no guarding. Musculoskeletal:      Cervical back: Neck supple. Right lower leg: Edema present. Left lower leg: Edema present. Comments: 1-2+ edema B/L LE   Skin:     Coloration: Skin is not pale. Neurological:      General: No focal deficit present. Mental Status: He is alert. Cranial Nerves: No cranial nerve deficit.       Comments: Oriented in month and year Psychiatric:         Behavior: Behavior normal.         Diagnostic Data       Recent labs and imaging studies were reviewed. Lab Results   Component Value Date    WBC 6.77 08/23/2023    HGB 10.5 (L) 08/23/2023    HCT 33.0 (L) 08/23/2023    MCV 97 08/23/2023     08/23/2023        Lab Results   Component Value Date    SODIUM 134 (L) 08/23/2023    K 3.9 08/23/2023     08/23/2023    CO2 24 08/23/2023    BUN 24 08/23/2023    CREATININE 1.15 08/23/2023    GLUC 94 08/23/2023    CALCIUM 8.7 08/23/2023     Code Status:      Full code           Portions of the record may have been created with voice recognition software. Occasional wrong word or "sound a like" substitutions may have occurred due to the inherent limitations of voice recognition software. Read the chart carefully and recognize, using context, where substitutions have occurred.     This note was electronically signed by Dr. Yanelis Osborn

## 2023-08-27 ENCOUNTER — HOSPITAL ENCOUNTER (INPATIENT)
Facility: HOSPITAL | Age: 88
LOS: 4 days | Discharge: NURSING, REHAB OR LONG-TERM CARE | DRG: 699 | End: 2023-08-31
Attending: EMERGENCY MEDICINE | Admitting: INTERNAL MEDICINE
Payer: MEDICARE

## 2023-08-27 ENCOUNTER — APPOINTMENT (EMERGENCY)
Dept: RADIOLOGY | Facility: HOSPITAL | Age: 88
DRG: 699 | End: 2023-08-27
Payer: MEDICARE

## 2023-08-27 ENCOUNTER — APPOINTMENT (EMERGENCY)
Dept: CT IMAGING | Facility: HOSPITAL | Age: 88
DRG: 699 | End: 2023-08-27
Payer: MEDICARE

## 2023-08-27 ENCOUNTER — TELEPHONE (OUTPATIENT)
Dept: OTHER | Facility: OTHER | Age: 88
End: 2023-08-27

## 2023-08-27 DIAGNOSIS — N32.89 BLADDER MASS: ICD-10-CM

## 2023-08-27 DIAGNOSIS — B02.9 HERPES ZOSTER: ICD-10-CM

## 2023-08-27 DIAGNOSIS — J90 BILATERAL PLEURAL EFFUSION: ICD-10-CM

## 2023-08-27 DIAGNOSIS — R41.82 ALTERED MENTAL STATUS: Primary | ICD-10-CM

## 2023-08-27 DIAGNOSIS — I50.9 CHF (CONGESTIVE HEART FAILURE) (HCC): ICD-10-CM

## 2023-08-27 DIAGNOSIS — E03.9 ACQUIRED HYPOTHYROIDISM: ICD-10-CM

## 2023-08-27 DIAGNOSIS — R33.8 ACUTE URINARY RETENTION: ICD-10-CM

## 2023-08-27 DIAGNOSIS — R33.9 URINARY RETENTION: ICD-10-CM

## 2023-08-27 DIAGNOSIS — G93.40 ENCEPHALOPATHY: ICD-10-CM

## 2023-08-27 DIAGNOSIS — N32.89 MASS OF BLADDER: ICD-10-CM

## 2023-08-27 LAB
2HR DELTA HS TROPONIN: -5 NG/L
ALBUMIN SERPL BCP-MCNC: 3.4 G/DL (ref 3.5–5)
ALP SERPL-CCNC: 71 U/L (ref 34–104)
ALT SERPL W P-5'-P-CCNC: 22 U/L (ref 7–52)
ANION GAP SERPL CALCULATED.3IONS-SCNC: 9 MMOL/L
AST SERPL W P-5'-P-CCNC: 23 U/L (ref 13–39)
BACTERIA UR QL AUTO: ABNORMAL /HPF
BASOPHILS # BLD AUTO: 0.04 THOUSANDS/ÂΜL (ref 0–0.1)
BASOPHILS NFR BLD AUTO: 1 % (ref 0–1)
BILIRUB SERPL-MCNC: 0.84 MG/DL (ref 0.2–1)
BILIRUB UR QL STRIP: NEGATIVE
BNP SERPL-MCNC: 555 PG/ML (ref 0–100)
BUN SERPL-MCNC: 26 MG/DL (ref 5–25)
CALCIUM ALBUM COR SERPL-MCNC: 9.2 MG/DL (ref 8.3–10.1)
CALCIUM SERPL-MCNC: 8.7 MG/DL (ref 8.4–10.2)
CARDIAC TROPONIN I PNL SERPL HS: 25 NG/L
CARDIAC TROPONIN I PNL SERPL HS: 30 NG/L
CHLORIDE SERPL-SCNC: 103 MMOL/L (ref 96–108)
CLARITY UR: CLEAR
CO2 SERPL-SCNC: 25 MMOL/L (ref 21–32)
COLOR UR: YELLOW
CREAT SERPL-MCNC: 1.11 MG/DL (ref 0.6–1.3)
EOSINOPHIL # BLD AUTO: 0.45 THOUSAND/ÂΜL (ref 0–0.61)
EOSINOPHIL NFR BLD AUTO: 6 % (ref 0–6)
ERYTHROCYTE [DISTWIDTH] IN BLOOD BY AUTOMATED COUNT: 13.6 % (ref 11.6–15.1)
FLUAV RNA RESP QL NAA+PROBE: NEGATIVE
FLUBV RNA RESP QL NAA+PROBE: NEGATIVE
GFR SERPL CREATININE-BSD FRML MDRD: 57 ML/MIN/1.73SQ M
GLUCOSE SERPL-MCNC: 104 MG/DL (ref 65–140)
GLUCOSE SERPL-MCNC: 112 MG/DL (ref 65–140)
GLUCOSE UR STRIP-MCNC: NEGATIVE MG/DL
HCT VFR BLD AUTO: 29.7 % (ref 36.5–49.3)
HGB BLD-MCNC: 9.9 G/DL (ref 12–17)
HGB UR QL STRIP.AUTO: ABNORMAL
IMM GRANULOCYTES # BLD AUTO: 0.04 THOUSAND/UL (ref 0–0.2)
IMM GRANULOCYTES NFR BLD AUTO: 1 % (ref 0–2)
KETONES UR STRIP-MCNC: ABNORMAL MG/DL
LACTATE SERPL-SCNC: 0.9 MMOL/L (ref 0.5–2)
LEUKOCYTE ESTERASE UR QL STRIP: NEGATIVE
LIPASE SERPL-CCNC: 33 U/L (ref 11–82)
LYMPHOCYTES # BLD AUTO: 0.98 THOUSANDS/ÂΜL (ref 0.6–4.47)
LYMPHOCYTES NFR BLD AUTO: 12 % (ref 14–44)
MCH RBC QN AUTO: 31.8 PG (ref 26.8–34.3)
MCHC RBC AUTO-ENTMCNC: 33.3 G/DL (ref 31.4–37.4)
MCV RBC AUTO: 96 FL (ref 82–98)
MONOCYTES # BLD AUTO: 0.89 THOUSAND/ÂΜL (ref 0.17–1.22)
MONOCYTES NFR BLD AUTO: 11 % (ref 4–12)
NEUTROPHILS # BLD AUTO: 5.85 THOUSANDS/ÂΜL (ref 1.85–7.62)
NEUTS SEG NFR BLD AUTO: 69 % (ref 43–75)
NITRITE UR QL STRIP: NEGATIVE
NON-SQ EPI CELLS URNS QL MICRO: ABNORMAL /HPF
NRBC BLD AUTO-RTO: 0 /100 WBCS
PH UR STRIP.AUTO: 6 [PH]
PLATELET # BLD AUTO: 337 THOUSANDS/UL (ref 149–390)
PMV BLD AUTO: 9.6 FL (ref 8.9–12.7)
POTASSIUM SERPL-SCNC: 3.4 MMOL/L (ref 3.5–5.3)
PROCALCITONIN SERPL-MCNC: 0.05 NG/ML
PROT SERPL-MCNC: 5.8 G/DL (ref 6.4–8.4)
PROT UR STRIP-MCNC: NEGATIVE MG/DL
RBC # BLD AUTO: 3.11 MILLION/UL (ref 3.88–5.62)
RBC #/AREA URNS AUTO: ABNORMAL /HPF
RSV RNA RESP QL NAA+PROBE: NEGATIVE
SARS-COV-2 RNA RESP QL NAA+PROBE: NEGATIVE
SODIUM SERPL-SCNC: 137 MMOL/L (ref 135–147)
SP GR UR STRIP.AUTO: 1.02 (ref 1–1.03)
TSH SERPL DL<=0.05 MIU/L-ACNC: 5.5 UIU/ML (ref 0.45–4.5)
UROBILINOGEN UR QL STRIP.AUTO: 0.2 E.U./DL
WBC # BLD AUTO: 8.25 THOUSAND/UL (ref 4.31–10.16)
WBC #/AREA URNS AUTO: ABNORMAL /HPF

## 2023-08-27 PROCEDURE — 85025 COMPLETE CBC W/AUTO DIFF WBC: CPT | Performed by: EMERGENCY MEDICINE

## 2023-08-27 PROCEDURE — 84484 ASSAY OF TROPONIN QUANT: CPT | Performed by: EMERGENCY MEDICINE

## 2023-08-27 PROCEDURE — 96374 THER/PROPH/DIAG INJ IV PUSH: CPT

## 2023-08-27 PROCEDURE — 99223 1ST HOSP IP/OBS HIGH 75: CPT | Performed by: INTERNAL MEDICINE

## 2023-08-27 PROCEDURE — G1004 CDSM NDSC: HCPCS

## 2023-08-27 PROCEDURE — 83690 ASSAY OF LIPASE: CPT | Performed by: EMERGENCY MEDICINE

## 2023-08-27 PROCEDURE — 83880 ASSAY OF NATRIURETIC PEPTIDE: CPT | Performed by: EMERGENCY MEDICINE

## 2023-08-27 PROCEDURE — 71045 X-RAY EXAM CHEST 1 VIEW: CPT

## 2023-08-27 PROCEDURE — 99285 EMERGENCY DEPT VISIT HI MDM: CPT

## 2023-08-27 PROCEDURE — 83605 ASSAY OF LACTIC ACID: CPT | Performed by: EMERGENCY MEDICINE

## 2023-08-27 PROCEDURE — 81003 URINALYSIS AUTO W/O SCOPE: CPT | Performed by: EMERGENCY MEDICINE

## 2023-08-27 PROCEDURE — 81001 URINALYSIS AUTO W/SCOPE: CPT | Performed by: EMERGENCY MEDICINE

## 2023-08-27 PROCEDURE — 70450 CT HEAD/BRAIN W/O DYE: CPT

## 2023-08-27 PROCEDURE — 99285 EMERGENCY DEPT VISIT HI MDM: CPT | Performed by: EMERGENCY MEDICINE

## 2023-08-27 PROCEDURE — 0241U HB NFCT DS VIR RESP RNA 4 TRGT: CPT | Performed by: EMERGENCY MEDICINE

## 2023-08-27 PROCEDURE — 80053 COMPREHEN METABOLIC PANEL: CPT | Performed by: EMERGENCY MEDICINE

## 2023-08-27 PROCEDURE — 84443 ASSAY THYROID STIM HORMONE: CPT | Performed by: EMERGENCY MEDICINE

## 2023-08-27 PROCEDURE — 93005 ELECTROCARDIOGRAM TRACING: CPT

## 2023-08-27 PROCEDURE — 84145 PROCALCITONIN (PCT): CPT | Performed by: EMERGENCY MEDICINE

## 2023-08-27 PROCEDURE — 82948 REAGENT STRIP/BLOOD GLUCOSE: CPT

## 2023-08-27 PROCEDURE — 84439 ASSAY OF FREE THYROXINE: CPT | Performed by: EMERGENCY MEDICINE

## 2023-08-27 PROCEDURE — 36415 COLL VENOUS BLD VENIPUNCTURE: CPT | Performed by: EMERGENCY MEDICINE

## 2023-08-27 PROCEDURE — 74177 CT ABD & PELVIS W/CONTRAST: CPT

## 2023-08-27 RX ORDER — ENOXAPARIN SODIUM 100 MG/ML
40 INJECTION SUBCUTANEOUS DAILY
Status: DISCONTINUED | OUTPATIENT
Start: 2023-08-28 | End: 2023-08-27

## 2023-08-27 RX ORDER — LIDOCAINE 50 MG/G
1 PATCH TOPICAL DAILY
Status: DISCONTINUED | OUTPATIENT
Start: 2023-08-28 | End: 2023-08-31 | Stop reason: HOSPADM

## 2023-08-27 RX ORDER — NICOTINE 21 MG/24HR
1 PATCH, TRANSDERMAL 24 HOURS TRANSDERMAL DAILY
Status: DISCONTINUED | OUTPATIENT
Start: 2023-08-28 | End: 2023-08-31 | Stop reason: HOSPADM

## 2023-08-27 RX ORDER — ATORVASTATIN CALCIUM 40 MG/1
40 TABLET, FILM COATED ORAL
Status: DISCONTINUED | OUTPATIENT
Start: 2023-08-27 | End: 2023-08-31 | Stop reason: HOSPADM

## 2023-08-27 RX ORDER — LEVOTHYROXINE SODIUM 88 UG/1
88 TABLET ORAL DAILY
Status: DISCONTINUED | OUTPATIENT
Start: 2023-08-28 | End: 2023-08-30

## 2023-08-27 RX ORDER — POTASSIUM CHLORIDE 20 MEQ/1
40 TABLET, EXTENDED RELEASE ORAL ONCE
Status: DISCONTINUED | OUTPATIENT
Start: 2023-08-27 | End: 2023-08-28

## 2023-08-27 RX ORDER — TAMSULOSIN HYDROCHLORIDE 0.4 MG/1
0.4 CAPSULE ORAL
Status: DISCONTINUED | OUTPATIENT
Start: 2023-08-27 | End: 2023-08-31 | Stop reason: HOSPADM

## 2023-08-27 RX ORDER — ACETAMINOPHEN 325 MG/1
650 TABLET ORAL EVERY 6 HOURS PRN
Status: DISCONTINUED | OUTPATIENT
Start: 2023-08-27 | End: 2023-08-31 | Stop reason: HOSPADM

## 2023-08-27 RX ORDER — NITROGLYCERIN 0.4 MG/1
0.4 TABLET SUBLINGUAL
Status: DISCONTINUED | OUTPATIENT
Start: 2023-08-27 | End: 2023-08-31 | Stop reason: HOSPADM

## 2023-08-27 RX ORDER — VALACYCLOVIR HYDROCHLORIDE 500 MG/1
1000 TABLET, FILM COATED ORAL EVERY 12 HOURS SCHEDULED
Status: DISCONTINUED | OUTPATIENT
Start: 2023-08-27 | End: 2023-08-28

## 2023-08-27 RX ORDER — ONDANSETRON 2 MG/ML
4 INJECTION INTRAMUSCULAR; INTRAVENOUS EVERY 6 HOURS PRN
Status: DISCONTINUED | OUTPATIENT
Start: 2023-08-27 | End: 2023-08-31 | Stop reason: HOSPADM

## 2023-08-27 RX ORDER — FUROSEMIDE 10 MG/ML
40 INJECTION INTRAMUSCULAR; INTRAVENOUS ONCE
Status: COMPLETED | OUTPATIENT
Start: 2023-08-27 | End: 2023-08-27

## 2023-08-27 RX ORDER — FUROSEMIDE 10 MG/ML
40 INJECTION INTRAMUSCULAR; INTRAVENOUS DAILY
Status: DISCONTINUED | OUTPATIENT
Start: 2023-08-28 | End: 2023-08-29

## 2023-08-27 RX ORDER — AMOXICILLIN 250 MG
1 CAPSULE ORAL
Status: DISCONTINUED | OUTPATIENT
Start: 2023-08-27 | End: 2023-08-31 | Stop reason: HOSPADM

## 2023-08-27 RX ORDER — HEPARIN SODIUM 5000 [USP'U]/ML
5000 INJECTION, SOLUTION INTRAVENOUS; SUBCUTANEOUS EVERY 8 HOURS SCHEDULED
Status: DISCONTINUED | OUTPATIENT
Start: 2023-08-27 | End: 2023-08-31 | Stop reason: HOSPADM

## 2023-08-27 RX ORDER — METOPROLOL SUCCINATE 25 MG/1
25 TABLET, EXTENDED RELEASE ORAL DAILY
Status: DISCONTINUED | OUTPATIENT
Start: 2023-08-28 | End: 2023-08-31 | Stop reason: HOSPADM

## 2023-08-27 RX ADMIN — TAMSULOSIN HYDROCHLORIDE 0.4 MG: 0.4 CAPSULE ORAL at 20:52

## 2023-08-27 RX ADMIN — IOHEXOL 80 ML: 350 INJECTION, SOLUTION INTRAVENOUS at 15:20

## 2023-08-27 RX ADMIN — ATORVASTATIN CALCIUM 40 MG: 40 TABLET, FILM COATED ORAL at 20:51

## 2023-08-27 RX ADMIN — VALACYCLOVIR 1000 MG: 500 TABLET, FILM COATED ORAL at 20:53

## 2023-08-27 RX ADMIN — SENNOSIDES AND DOCUSATE SODIUM 1 TABLET: 50; 8.6 TABLET ORAL at 21:03

## 2023-08-27 RX ADMIN — FUROSEMIDE 40 MG: 10 INJECTION, SOLUTION INTRAMUSCULAR; INTRAVENOUS at 17:18

## 2023-08-27 NOTE — ASSESSMENT & PLAN NOTE
· Likely iatrogenic volume overload 2/2 giving IV fluid during recent admission for ROMAN  · Will repeat echocardiogram  · Gentle IV diuresis  · I's and outs  · Respiratory status stable

## 2023-08-27 NOTE — H&P
[facility]  H&P  Name: Louisa Peterson 80 y.o. male I MRN: [id number]  Unit/Bed#: -01 I Date of Admission: 8/27/2023   Date of Service: 8/27/2023 I Hospital Day: 0      Assessment/Plan   * Encephalopathy  Assessment & Plan  · Patient's son reported acute decline in his MS last several  weeks. Patient was independent and alert awake at baseline. Now he sleeps most of the day with confusions and newly developed urinary retention  · Will obtain MRI brain   · Appreciate neurology evaluation    Acute urinary retention  Assessment & Plan  · Easton inserted in ED with immediate removal of 900 cc clear urine  · Continue Easton catheter. Continue Flomax. Will consult urology      Bilateral pleural effusion  Assessment & Plan  · Likely iatrogenic volume overload 2/2 giving IV fluid during recent admission for ROMAN  · Will repeat echocardiogram  · Gentle IV diuresis  · I's and outs  · Respiratory status stable    Mass of bladder  Assessment & Plan  New mass in the bladder seen on CT abdomen  Will need urology follow-up    Recent history of herpes zoster  Assessment & Plan  He has been getting Valtrex. Will continue        VTE Prophylaxis: Heparin  / sequential compression device   Code Status: Full code  POLST: POLST form is not discussed and not completed at this time. Discussion with family: Pt's son     Anticipated Length of Stay:  Patient will be admitted on an Inpatient basis with an anticipated length of stay of  > 2 midnights. Justification for Hospital Stay: Above    Chief Complaint: Lethargy and confusion    History of Present Illness:    Louisa Peterson is a 80 y.o. male with PMHX of recent admission for herpes zoster and ROMAN , CAD , hypertension , hypothyroidism who presents with lethargy and confusion.   According to patient's son, patient was an independent, relatively high functioning individual for his age until the last several weeks when he was noted to be sleepy more than usual  and intermittently confused. Patient was recently discharged to rehab on Aug 23 after being treated  for herpes zoster and ROMAN. Pt was seen by neuropsych during the hospitalization and deemed to have mild cognitive impairment but with decision making capacity. Since the discharge patient stayed in bed most of the time due to generalized weakness and lethargy. He had a fall at the facility several days ago when trying to get to the bathroom at the time. Denies LOC      In the ED, patient was found to have bilateral pleural effusions and a new bladder mass on CT. Found to have urinary retention which was also new, immediate removal of 900 cc clear urine after Easton catheter placed. Pt denies chest pain or dyspnea. Denies fever or chills or headache    Review of Systems:    Review of Systems   Constitutional: Positive for appetite change and fatigue. Respiratory: Negative for shortness of breath. Cardiovascular: Positive for leg swelling. Gastrointestinal: Negative for abdominal pain and nausea. Neurological: Positive for weakness. All other systems reviewed and are negative. Past Medical and Surgical History:     Past Medical History:   Diagnosis Date   • Arthritis    • Coronary artery disease    • Disease of thyroid gland    • Heart disease    • Hypertension        Past Surgical History:   Procedure Laterality Date   • CARDIAC SURGERY     • HERNIA REPAIR         Meds/Allergies:    Prior to Admission medications    Medication Sig Start Date End Date Taking?  Authorizing Provider   aspirin (ECOTRIN LOW STRENGTH) 81 mg EC tablet Take 81 mg by mouth daily    Historical Provider, MD   atorvastatin (LIPITOR) 40 mg tablet Take 1 tablet (40 mg total) by mouth daily with dinner 4/25/18   Brianda Rojas MD   cyanocobalamin 1000 MCG tablet Take 100 mcg by mouth daily    Historical Provider, MD   levothyroxine (SYNTHROID) 88 mcg tablet Take 1 tablet (88 mcg total) by mouth daily 4/25/18   Marbella MD Sheri   lidocaine (LIDODERM) 5 % Apply 1 patch topically over 12 hours daily Remove & Discard patch within 12 hours or as directed by MD Do not start before August 24, 2023. 8/24/23   Trina Kuhn,    metoprolol succinate (TOPROL-XL) 25 mg 24 hr tablet Take 1 tablet (25 mg total) by mouth daily 4/25/18   Marcy Mckeon MD   nicotine (NICODERM CQ) 21 mg/24 hr TD 24 hr patch Place 1 patch on the skin over 24 hours daily Do not start before August 24, 2023. 8/24/23   Trina Kuhn, DO   nitroglycerin (NITROSTAT) 0.4 mg SL tablet Place 0.4 mg under the tongue every 5 (five) minutes as needed for chest pain    Historical Provider, MD   senna-docusate sodium (SENOKOT S) 8.6-50 mg per tablet Take 1 tablet by mouth daily at bedtime 8/23/23   Trina Macedo DO   tamsulosin (FLOMAX) 0.4 mg  2/24/20   Historical Provider, MD   valACYclovir (VALTREX) 1,000 mg tablet Take 1 tablet (1,000 mg total) by mouth every 12 (twelve) hours for 4 doses 8/23/23 8/25/23  Trina Macedo,          Allergies:    Allergies   Allergen Reactions   • Penicillins Swelling   • Keflex [Cephalexin]    • Prednisone Other (See Comments)     Pt states "i don't know, I break out"   • Shellfish-Derived Products - Food Allergy Other (See Comments)     Generalized doesn't feel right       Social History:     Marital Status: Single   Patient Pre-hospital Living Situation: Rehab   Patient Pre-hospital Level of Mobility: Walker    Social History     Substance and Sexual Activity   Alcohol Use Not Currently    Comment: socially     Social History     Tobacco Use   Smoking Status Some Days   • Types: Pipe   • Passive exposure: Never   Smokeless Tobacco Never     Social History     Substance and Sexual Activity   Drug Use No       Family History:    non-contributory    Physical Exam:     Vitals:   Blood Pressure: 156/88 (08/27/23 1810)  Pulse: 59 (08/27/23 1810)  Temperature: 98.1 °F (36.7 °C) (08/27/23 1810)  Temp Source: Oral (08/27/23 1810)  Respirations: 18 (08/27/23 1810)  Height: 5' 6" (167.6 cm) (08/27/23 1810)  Weight - Scale: 61 kg (134 lb 7.7 oz) (08/27/23 1900)  SpO2: 94 % (08/27/23 1810)    Physical Exam  Constitutional:       General: He is not in acute distress. Appearance: He is not ill-appearing, toxic-appearing or diaphoretic. Eyes:      General:         Right eye: No discharge. Left eye: No discharge. Cardiovascular:      Rate and Rhythm: Normal rate and regular rhythm. Pulses: Normal pulses. Pulmonary:      Effort: Pulmonary effort is normal.      Breath sounds: Examination of the right-lower field reveals decreased breath sounds. Examination of the left-lower field reveals decreased breath sounds. Decreased breath sounds present. Abdominal:      General: Abdomen is flat. There is no distension. Palpations: Abdomen is soft. Tenderness: There is no abdominal tenderness. Musculoskeletal:         General: No swelling. Cervical back: No rigidity. Skin:     General: Skin is warm. Neurological:      Mental Status: He is oriented to person, place, and time. GCS: GCS eye subscore is 4. GCS verbal subscore is 5. GCS motor subscore is 6. Comments: Finger to nose mild dysmetria more notable on the left side.  5/5 muscle power B/L LE throughout, 4/5 LE in hip flexion and dorsiflexion      Psychiatric:         Mood and Affect: Mood normal.       Additional Data:     Lab Results:     Results from last 7 days   Lab Units 08/27/23  1429   WBC Thousand/uL 8.25   HEMOGLOBIN g/dL 9.9*   HEMATOCRIT % 29.7*   PLATELETS Thousands/uL 337   NEUTROS PCT % 69   LYMPHS PCT % 12*   MONOS PCT % 11   EOS PCT % 6     Results from last 7 days   Lab Units 08/27/23  1429   SODIUM mmol/L 137   POTASSIUM mmol/L 3.4*   CHLORIDE mmol/L 103   CO2 mmol/L 25   BUN mg/dL 26*   CREATININE mg/dL 1.11   ANION GAP mmol/L 9   CALCIUM mg/dL 8.7   ALBUMIN g/dL 3.4*   TOTAL BILIRUBIN mg/dL 0.84   ALK PHOS U/L 71   ALT U/L 22   AST U/L 23   GLUCOSE RANDOM mg/dL 104         Results from last 7 days   Lab Units 08/27/23  1440   POC GLUCOSE mg/dl 112         Results from last 7 days   Lab Units 08/27/23  1429   LACTIC ACID mmol/L 0.9   PROCALCITONIN ng/ml 0.05       Imaging:    CT abdomen pelvis with contrast   Final Result by Amarjit Chandler MD (08/27 1658)         1. Bilateral pleural effusions, larger on the right and small ascites suggesting cardiac insufficiency. 2. 1.8 cm enhancing mass in the urinary bladder concerning for neoplasm. Direct visualization recommended. 3. Incidental findings described in the body of the report. The study was marked in [facility] for immediate notification. Workstation performed: [id number]         CT head without contrast   Final Result by Amarjit Chandler MD (08/27 1644)      No acute intracranial abnormality. Chronic microangiopathic changes. Workstation performed: [id number]         XR chest portable - 1 view    (Results Pending)   MRI inpatient order    (Results Pending)         [facility] Yes     ** Please Note: This note has been constructed using a voice recognition system.  **

## 2023-08-27 NOTE — ED PROVIDER NOTES
History  Chief Complaint   Patient presents with   • Altered Mental Status     Son reports intermittent confusion, recently treated for shingles, SNF reports decreased urine output     80-year-old male with history of coronary artery disease, hypertension and hypothyroidism presents to the emergency department for evaluation of intermittent episodes of confusion. The patient arrived via EMS from his skilled nursing facility. The patient is accompanied by his son who reports that over the past several days the patient has had intermittent episodes of confusion. He states that the patient has also had decreased urine output. He reports that the patient was recently hospitalized after a fall. Patient was also found to have an ROMAN and herpes zoster. The patient was discharged to a skilled nursing facility 4 days ago. The patient reports that he has been feeling fatigued but offers no other complaints. He denies headache, blurry vision, chest pain, shortness of breath, fevers, chills, nausea, vomiting, diarrhea and sick contacts. Prior to Admission Medications   Prescriptions Last Dose Informant Patient Reported? Taking?   aspirin (ECOTRIN LOW STRENGTH) 81 mg EC tablet 8/27/2023 Self Yes Yes   Sig: Take 81 mg by mouth daily   atorvastatin (LIPITOR) 40 mg tablet 8/27/2023 Self No Yes   Sig: Take 1 tablet (40 mg total) by mouth daily with dinner   cyanocobalamin 1000 MCG tablet 8/27/2023 Self Yes Yes   Sig: Take 100 mcg by mouth daily   levothyroxine (SYNTHROID) 88 mcg tablet 8/27/2023 Self No Yes   Sig: Take 1 tablet (88 mcg total) by mouth daily   lidocaine (LIDODERM) 5 % 8/27/2023  No Yes   Sig: Apply 1 patch topically over 12 hours daily Remove & Discard patch within 12 hours or as directed by MD Do not start before August 24, 2023.    metoprolol succinate (TOPROL-XL) 25 mg 24 hr tablet 8/27/2023 Self No Yes   Sig: Take 1 tablet (25 mg total) by mouth daily   nicotine (NICODERM CQ) 21 mg/24 hr TD 24 hr patch Not Taking  No No   Sig: Place 1 patch on the skin over 24 hours daily Do not start before August 24, 2023. Patient not taking: Reported on 8/28/2023   nitroglycerin (NITROSTAT) 0.4 mg SL tablet Unknown Self Yes No   Sig: Place 0.4 mg under the tongue every 5 (five) minutes as needed for chest pain   senna-docusate sodium (SENOKOT S) 8.6-50 mg per tablet 8/27/2023  No Yes   Sig: Take 1 tablet by mouth daily at bedtime   tamsulosin (FLOMAX) 0.4 mg 8/27/2023 Self Yes Yes   valACYclovir (VALTREX) 1,000 mg tablet   No No   Sig: Take 1 tablet (1,000 mg total) by mouth every 12 (twelve) hours for 4 doses      Facility-Administered Medications: None       Past Medical History:   Diagnosis Date   • Arthritis    • Coronary artery disease    • Disease of thyroid gland    • Heart disease    • Hypertension        Past Surgical History:   Procedure Laterality Date   • CARDIAC SURGERY     • HERNIA REPAIR         Family History   Problem Relation Age of Onset   • No Known Problems Mother    • No Known Problems Father      I have reviewed and agree with the history as documented. E-Cigarette/Vaping   • E-Cigarette Use Never User      E-Cigarette/Vaping Substances     Social History     Tobacco Use   • Smoking status: Some Days     Types: Pipe     Passive exposure: Never   • Smokeless tobacco: Never   Vaping Use   • Vaping Use: Never used   Substance Use Topics   • Alcohol use: Not Currently     Comment: socially   • Drug use: No       Review of Systems   Constitutional: Positive for fatigue. Negative for chills and fever. HENT: Negative for ear pain and sore throat. Eyes: Negative for pain and visual disturbance. Respiratory: Negative for cough and shortness of breath. Cardiovascular: Positive for leg swelling. Negative for chest pain and palpitations. Gastrointestinal: Negative for abdominal pain and vomiting. Genitourinary: Negative for dysuria and hematuria.    Musculoskeletal: Negative for arthralgias and back pain. Skin: Positive for rash. Negative for color change. Neurological: Negative for seizures and syncope. All other systems reviewed and are negative. Physical Exam  Physical Exam  Vitals and nursing note reviewed. Constitutional:       General: He is not in acute distress. Appearance: He is well-developed. HENT:      Head: Normocephalic and atraumatic. Eyes:      Conjunctiva/sclera: Conjunctivae normal.   Cardiovascular:      Rate and Rhythm: Normal rate and regular rhythm. Pulses:           Radial pulses are 2+ on the right side and 2+ on the left side. Dorsalis pedis pulses are 1+ on the right side and 1+ on the left side. Pulmonary:      Effort: Pulmonary effort is normal. No respiratory distress. Breath sounds: Normal breath sounds. Abdominal:      Palpations: Abdomen is soft. Tenderness: There is abdominal tenderness in the suprapubic area. There is no guarding or rebound. Musculoskeletal:         General: No swelling. Cervical back: Neck supple. Right lower leg: Edema present. Left lower leg: Edema present. Skin:     General: Skin is warm and dry. Capillary Refill: Capillary refill takes less than 2 seconds. Findings: Rash present. Neurological:      Mental Status: He is alert and oriented to person, place, and time. GCS: GCS eye subscore is 4. GCS verbal subscore is 5. GCS motor subscore is 6. Sensory: Sensation is intact. Motor: Motor function is intact. Coordination: Coordination is intact.    Psychiatric:         Mood and Affect: Mood normal.         Vital Signs  ED Triage Vitals [08/27/23 1342]   Temperature Pulse Respirations Blood Pressure SpO2   97.6 °F (36.4 °C) 72 20 154/66 97 %      Temp Source Heart Rate Source Patient Position - Orthostatic VS BP Location FiO2 (%)   Oral Monitor Lying Left arm --      Pain Score       No Pain           Vitals:    08/27/23 1342 08/27/23 1647 08/27/23 1716 08/27/23 1810   BP: 154/66 (!) 176/67 158/71 156/88   Pulse: 72 (!) 52 63 59   Patient Position - Orthostatic VS: Lying Lying  Lying         Visual Acuity      ED Medications  Medications   aspirin (ECOTRIN LOW STRENGTH) EC tablet 81 mg (has no administration in time range)   atorvastatin (LIPITOR) tablet 40 mg (40 mg Oral Given 8/27/23 2051)   cyanocobalamin (VITAMIN B-12) tablet 100 mcg (has no administration in time range)   levothyroxine tablet 88 mcg (has no administration in time range)   lidocaine (LIDODERM) 5 % patch 1 patch (has no administration in time range)   metoprolol succinate (TOPROL-XL) 24 hr tablet 25 mg (has no administration in time range)   nicotine (NICODERM CQ) 21 mg/24 hr TD 24 hr patch 1 patch (has no administration in time range)   senna-docusate sodium (SENOKOT S) 8.6-50 mg per tablet 1 tablet (1 tablet Oral Given 8/27/23 2103)   valACYclovir (VALTREX) tablet 1,000 mg (1,000 mg Oral Given 8/27/23 2053)   nitroglycerin (NITROSTAT) SL tablet 0.4 mg (has no administration in time range)   tamsulosin (FLOMAX) capsule 0.4 mg (0.4 mg Oral Given 8/27/23 2052)   ondansetron (ZOFRAN) injection 4 mg (has no administration in time range)   acetaminophen (TYLENOL) tablet 650 mg (has no administration in time range)   furosemide (LASIX) injection 40 mg (has no administration in time range)   heparin (porcine) subcutaneous injection 5,000 Units (5,000 Units Subcutaneous Given 8/28/23 0611)   potassium chloride oral solution 40 mEq (has no administration in time range)   iohexol (OMNIPAQUE) 350 MG/ML injection (SINGLE-DOSE) 100 mL (80 mL Intravenous Given 8/27/23 1520)   furosemide (LASIX) injection 40 mg (40 mg Intravenous Given 8/27/23 1718)       Diagnostic Studies  Results Reviewed     Procedure Component Value Units Date/Time    T4, free [[id number]]  (Abnormal) Collected: 08/27/23 1429    Lab Status: Final result Specimen: Blood from Arm, Left Updated: 08/28/23 0045     Free T4 1.57 ng/dL Narrative:        "Therapeutic range for patients medicated with thyroid disorders: 0.61-1.24 ng/dL."    HS Troponin I 2hr [[id number]]  (Normal) Collected: 08/27/23 1641    Lab Status: Final result Specimen: Blood from Line, Venous Updated: 08/27/23 1713     hs TnI 2hr 25 ng/L      Delta 2hr hsTnI -5 ng/L     HS Troponin I 4hr [[id number]]     Lab Status: No result Specimen: Blood     FLU/RSV/COVID - if FLU/RSV clinically relevant [[id number]]  (Normal) Collected: 08/27/23 1429    Lab Status: Final result Specimen: Nares from Nose Updated: 08/27/23 1535     SARS-CoV-2 Negative     INFLUENZA A PCR Negative     INFLUENZA B PCR Negative     RSV PCR Negative    Narrative:      FOR PEDIATRIC PATIENTS - copy/paste COVID Guidelines URL to browser: [email or web address]    SARS-CoV-2 assay is a Nucleic Acid Amplification assay intended for the  qualitative detection of nucleic acid from SARS-CoV-2 in nasopharyngeal  swabs. Results are for the presumptive identification of SARS-CoV-2 RNA. Positive results are indicative of infection with SARS-CoV-2, the virus  causing COVID-19, but do not rule out bacterial infection or co-infection  with other viruses. Laboratories within the [facility] and its  territories are required to report all positive results to the appropriate  public health authorities. Negative results do not preclude SARS-CoV-2  infection and should not be used as the sole basis for treatment or other  patient management decisions. Negative results must be combined with  clinical observations, patient history, and epidemiological information. This test has not been FDA cleared or approved. This test has been authorized by FDA under an Emergency Use Authorization  (EUA).  This test is only authorized for the duration of time the  declaration that circumstances exist justifying the authorization of the  emergency use of an in vitro diagnostic tests for detection of SARS-CoV-2  virus and/or diagnosis of COVID-19 infection under section 564(b)(1) of  the Act, 21 U. S.C. 119KJM-0(Y)(2), unless the authorization is terminated  or revoked sooner. The test has been validated but independent review by FDA  and CLIA is pending. Test performed using [employer] Gene[employer]pert: This RT-PCR assay targets N2,  a region unique to SARS-CoV-2. A conserved region in the E-gene was chosen  for pan-Sarbecovirus detection which includes SARS-CoV-2. According to CMS-2020-01-R, this platform meets the definition of high-throughput technology. Urine Microscopic [[id number]]  (Abnormal) Collected: 08/27/23 1434    Lab Status: Final result Specimen: Urine, Indwelling Easton Catheter Updated: 08/27/23 1528     RBC, UA 4-10 /hpf      WBC, UA 0-1 /hpf      Epithelial Cells None Seen /hpf      Bacteria, UA Occasional /hpf     B-Type Natriuretic Peptide(BNP) [[id number]]  (Abnormal) Collected: 08/27/23 1429    Lab Status: Final result Specimen: Blood from Arm, Left Updated: 08/27/23 1519      pg/mL     TSH, 3rd generation with Free T4 reflex [[id number]]  (Abnormal) Collected: 08/27/23 1429    Lab Status: Final result Specimen: Blood from Arm, Left Updated: 08/27/23 1509     TSH 3RD GENERATON 5.504 uIU/mL     Procalcitonin [[id number]]  (Normal) Collected: 08/27/23 1429    Lab Status: Final result Specimen: Blood from Arm, Left Updated: 08/27/23 1502     Procalcitonin 0.05 ng/ml     HS Troponin 0hr (reflex protocol) [[id number]]  (Normal) Collected: 08/27/23 1429    Lab Status: Final result Specimen: Blood from Arm, Left Updated: 08/27/23 1500     hs TnI 0hr 30 ng/L     Lactic acid, plasma (w/reflex if result > 2.0) [[id number]]  (Normal) Collected: 08/27/23 1429    Lab Status: Final result Specimen: Blood from Arm, Left Updated: 08/27/23 1455     LACTIC ACID 0.9 mmol/L     Narrative:      Result may be elevated if tourniquet was used during collection.     Lipase [[id number]]  (Normal) Collected: 08/27/23 1429    Lab Status: Final result Specimen: Blood from Arm, Left Updated: 08/27/23 1454     Lipase 33 u/L     Comprehensive metabolic panel [[id number]]  (Abnormal) Collected: 08/27/23 1429    Lab Status: Final result Specimen: Blood from Arm, Left Updated: 08/27/23 1454     Sodium 137 mmol/L      Potassium 3.4 mmol/L      Chloride 103 mmol/L      CO2 25 mmol/L      ANION GAP 9 mmol/L      BUN 26 mg/dL      Creatinine 1.11 mg/dL      Glucose 104 mg/dL      Calcium 8.7 mg/dL      Corrected Calcium 9.2 mg/dL      AST 23 U/L      ALT 22 U/L      Alkaline Phosphatase 71 U/L      Total Protein 5.8 g/dL      Albumin 3.4 g/dL      Total Bilirubin 0.84 mg/dL      eGFR 57 ml/min/1.73sq m     Narrative:      [place] guidelines for Chronic Kidney Disease (CKD):   •  Stage 1 with normal or high GFR (GFR > 90 mL/min/1.73 square meters)  •  Stage 2 Mild CKD (GFR = 60-89 mL/min/1.73 square meters)  •  Stage 3A Moderate CKD (GFR = 45-59 mL/min/1.73 square meters)  •  Stage 3B Moderate CKD (GFR = 30-44 mL/min/1.73 square meters)  •  Stage 4 Severe CKD (GFR = 15-29 mL/min/1.73 square meters)  •  Stage 5 End Stage CKD (GFR <15 mL/min/1.73 square meters)  Note: GFR calculation is accurate only with a steady state creatinine    UA w Reflex to Microscopic w Reflex to Culture [[id number]]  (Abnormal) Collected: 08/27/23 1434    Lab Status: Final result Specimen: Urine, Indwelling Easton Catheter Updated: 08/27/23 1445     Color, UA Yellow     Clarity, UA Clear     Specific Gravity, UA 1.020     pH, UA 6.0     Leukocytes, UA Negative     Nitrite, UA Negative     Protein, UA Negative mg/dl      Glucose, UA Negative mg/dl      Ketones, UA Trace mg/dl      Urobilinogen, UA 0.2 E.U./dl      Bilirubin, UA Negative     Occult Blood, UA 2+    Fingerstick Glucose (POCT) [[id number]]  (Normal) Collected: 08/27/23 1440    Lab Status: Final result Updated: 08/27/23 1441     POC Glucose 112 mg/dl     CBC and differential [[id number]]  (Abnormal) Collected: 08/27/23 1429    Lab Status: Final result Specimen: Blood from Arm, Left Updated: 08/27/23 1436     WBC 8.25 Thousand/uL      RBC 3.11 Million/uL      Hemoglobin 9.9 g/dL      Hematocrit 29.7 %      MCV 96 fL      MCH 31.8 pg      MCHC 33.3 g/dL      RDW 13.6 %      MPV 9.6 fL      Platelets 598 Thousands/uL      nRBC 0 /100 WBCs      Neutrophils Relative 69 %      Immat GRANS % 1 %      Lymphocytes Relative 12 %      Monocytes Relative 11 %      Eosinophils Relative 6 %      Basophils Relative 1 %      Neutrophils Absolute 5.85 Thousands/µL      Immature Grans Absolute 0.04 Thousand/uL      Lymphocytes Absolute 0.98 Thousands/µL      Monocytes Absolute 0.89 Thousand/µL      Eosinophils Absolute 0.45 Thousand/µL      Basophils Absolute 0.04 Thousands/µL                  CT abdomen pelvis with contrast   Final Result by Thu Seymour MD (08/27 1658)         1. Bilateral pleural effusions, larger on the right and small ascites suggesting cardiac insufficiency. 2. 1.8 cm enhancing mass in the urinary bladder concerning for neoplasm. Direct visualization recommended. 3. Incidental findings described in the body of the report. The study was marked in [facility] for immediate notification. Workstation performed: [id number]         CT head without contrast   Final Result by Thu Seymour MD (08/27 1644)      No acute intracranial abnormality. Chronic microangiopathic changes.                   Workstation performed: [id number]         XR chest portable - 1 view   ED Interpretation by Suellen Villatoro MD (08/28 9883)   Bilateral effusions      MRI inpatient order    (Results Pending)              Procedures  ECG 12 Lead Documentation Only    Date/Time: 8/28/2023 7:25 AM    Performed by: Suellen Villatoro MD  Authorized by: Suellen Villatoro MD    ECG reviewed by me, the ED Provider: yes    Patient location:  ED  Previous ECG:     Previous ECG: Unavailable    Comparison to cardiac monitor: Yes    Interpretation:     Interpretation: abnormal    Rate:     ECG rate assessment: normal    Rhythm:     Rhythm: atrial fibrillation    Ectopy:     Ectopy: none    QRS:     QRS axis:  Normal  Conduction:     Conduction: normal    ST segments:     ST segments:  Non-specific  T waves:     T waves: non-specific    Other findings:     Other findings: LVH and prolonged qTc interval               ED Course                       Medical Decision Making  80-year-old male presented to the emergency department for evaluation. On arrival the patient was awake, alert and in no acute distress. On exam the patient was noted to have a rash consistent with herpes zoster. Patient also had a distended bladder with suprapubic tenderness to palpation. A Easton catheter was placed by nursing with 900 cc of urine output. EKG was ordered to evaluate for acute ischemia/arrhythmia. On my interpretation EKG in rate controlled atrial fibrillation with no acute ischemic changes. Chest x-ray was ordered to evaluate for acute cardiopulmonary process including but not limited to edema, effusion, pneumonia, pneumothorax. Chest x-ray on my interpretation with bilateral effusions. Work-up done in the emergency department showed the patient had an elevated BNP, elevated TSH, initial troponin of 25 and hemoglobin of 9.9. WBC, procalcitonin and lactic acid all within normal limits. Urinalysis was not consistent with a urinary tract infection. CT scan of the patient's abdomen and pelvis was ordered to evaluate for acute abdominal/pelvic pathology including but not limited to obstruction, perforation, infection, abscess. CT abdomen and pelvis showed bilateral pleural effusions and a 1.8 cm enhancing mass in the urinary bladder concerning for neoplasm.   CT scan of the patient's head was ordered to evaluate for acute intracranial pathology including but not limited to SDH, SAH, epidural, mass, fracture. CT scan of the patient's head with no acute findings. All diagnostic studies were discussed with the patient and the patient's son in detail including incidental findings. Recommendation was made for the patient to be admitted to the hospital for further treatment and evaluation. Altered mental status: acute illness or injury  Bilateral pleural effusion: acute illness or injury  Bladder mass: acute illness or injury  CHF (congestive heart failure) ([address]): acute illness or injury  Herpes zoster: acute illness or injury  Urinary retention: acute illness or injury  Amount and/or Complexity of Data Reviewed  Labs: ordered. Radiology: ordered. ECG/medicine tests: ordered and independent interpretation performed. Risk  Prescription drug management. Decision regarding hospitalization.           Disposition  Final diagnoses:   Herpes zoster   Urinary retention   Bladder mass   Altered mental status   Bilateral pleural effusion   CHF (congestive heart failure) ([address])     Time reflects when diagnosis was documented in both MDM as applicable and the Disposition within this note     Time User Action Codes Description Comment    8/27/2023  5:28 PM Clari Miles Add [I50.9] CHF (congestive heart failure) ([address])     8/27/2023  5:28 PM Clari [place] Add [B02.9] Herpes zoster     8/27/2023  5:28 PM Clari [place] Add [R33.9] Urinary retention     8/27/2023  6:54 PM Loida Mariner Add [G93.40] Encephalopathy     8/27/2023  6:55 PM Loida Mariner Add [N32.89] Mass of bladder     8/28/2023  7:42 AM Jose Hoffmann Add [R33.8] Acute urinary retention     8/28/2023  7:50 AM Clari Miles Add [N32.89] Bladder mass     8/28/2023  7:50 AM Clari Miles Add [R41.82] Altered mental status     8/28/2023  7:50 AM Clari [place] Modify [I50.9] CHF (congestive heart failure) ([address])     8/28/2023  7:50 AM Clari Miles Modify [R41.82] Altered mental status     8/28/2023  7:51 AM Mini Amaral Add [J90] Bilateral pleural effusion     8/28/2023  7:51 AM Mini Amaral Modify [N32.89] Bladder mass     8/28/2023  7:51 AM Mini Amaral Modify [R41.82] Altered mental status     8/28/2023  7:51 AM Mini Amaral Remove [I50.9] CHF (congestive heart failure) ([address])     8/28/2023  7:51 AM Mini Amaral Add [I50.9] CHF (congestive heart failure) [facility])       ED Disposition     ED Disposition   Admit    Condition   Stable    Date/Time   Sun Aug 27, 2023  5:28 PM    Comment   Case was discussed with dawna and the patient's admission status was agreed to be Admission Status: inpatient status to the service of Dr. [place]. Follow-up Information    None         Current Discharge Medication List      CONTINUE these medications which have NOT CHANGED    Details   aspirin (ECOTRIN LOW STRENGTH) 81 mg EC tablet Take 81 mg by mouth daily      atorvastatin (LIPITOR) 40 mg tablet Take 1 tablet (40 mg total) by mouth daily with dinner  Qty: 90 tablet, Refills: 3    Associated Diagnoses: Mixed hyperlipidemia      cyanocobalamin 1000 MCG tablet Take 100 mcg by mouth daily      levothyroxine (SYNTHROID) 88 mcg tablet Take 1 tablet (88 mcg total) by mouth daily  Qty: 90 tablet, Refills: 3    Associated Diagnoses: Hypothyroidism, unspecified type      lidocaine (LIDODERM) 5 % Apply 1 patch topically over 12 hours daily Remove & Discard patch within 12 hours or as directed by MD Do not start before August 24, 2023.   Refills: 0    Associated Diagnoses: Acute low back pain      metoprolol succinate (TOPROL-XL) 25 mg 24 hr tablet Take 1 tablet (25 mg total) by mouth daily  Qty: 90 tablet, Refills: 3    Associated Diagnoses: Essential hypertension      senna-docusate sodium (SENOKOT S) 8.6-50 mg per tablet Take 1 tablet by mouth daily at bedtime  Refills: 0    Associated Diagnoses: Acute low back pain      tamsulosin (FLOMAX) 0.4 mg       nicotine (NICODERM CQ) 21 mg/24 hr TD 24 hr patch Place 1 patch on the skin over 24 hours daily Do not start before August 24, 2023. Qty: 28 patch, Refills: 0    Associated Diagnoses: Acute low back pain      nitroglycerin (NITROSTAT) 0.4 mg SL tablet Place 0.4 mg under the tongue every 5 (five) minutes as needed for chest pain      valACYclovir (VALTREX) 1,000 mg tablet Take 1 tablet (1,000 mg total) by mouth every 12 (twelve) hours for 4 doses  Qty: 4 tablet, Refills: 0    Associated Diagnoses: Herpes zoster with complication             No discharge procedures on file.     PDMP Review       Value Time User    PDMP Reviewed  Yes 8/16/2023  2:01 PM Yanely Winters DO          ED Provider  Electronically Signed by           Brigido Lomas MD  08/28/23 0366

## 2023-08-27 NOTE — ASSESSMENT & PLAN NOTE
· Patient's son reported acute decline in his MS last several  weeks. Patient was independent and alert awake at baseline.  Now he sleeps most of the day with confusions and newly developed urinary retention  · Will obtain MRI brain   · Appreciate neurology evaluation

## 2023-08-27 NOTE — ASSESSMENT & PLAN NOTE
· Easton inserted in ED with immediate removal of 900 cc clear urine  · Continue Easton catheter. Continue Flomax.   Will consult urology

## 2023-08-27 NOTE — TELEPHONE ENCOUNTER
Alecia Wells from [facility] called in stating the pt's family is insisting he go to the ER. On call paged via TC.

## 2023-08-28 ENCOUNTER — APPOINTMENT (INPATIENT)
Dept: MRI IMAGING | Facility: HOSPITAL | Age: 88
DRG: 699 | End: 2023-08-28
Payer: MEDICARE

## 2023-08-28 ENCOUNTER — APPOINTMENT (INPATIENT)
Dept: NON INVASIVE DIAGNOSTICS | Facility: HOSPITAL | Age: 88
DRG: 699 | End: 2023-08-28
Payer: MEDICARE

## 2023-08-28 ENCOUNTER — APPOINTMENT (INPATIENT)
Dept: RADIOLOGY | Facility: HOSPITAL | Age: 88
DRG: 699 | End: 2023-08-28
Payer: MEDICARE

## 2023-08-28 PROBLEM — E44.0 MODERATE PROTEIN-CALORIE MALNUTRITION (HCC): Status: ACTIVE | Noted: 2023-08-28

## 2023-08-28 PROBLEM — E87.6 HYPOKALEMIA: Status: ACTIVE | Noted: 2023-08-28

## 2023-08-28 LAB
ANION GAP SERPL CALCULATED.3IONS-SCNC: 10 MMOL/L
AORTIC ROOT: 3.6 CM
APICAL FOUR CHAMBER EJECTION FRACTION: 78 %
ASCENDING AORTA: 3.2 CM
BASOPHILS # BLD AUTO: 0.04 THOUSANDS/ÂΜL (ref 0–0.1)
BASOPHILS NFR BLD AUTO: 0 % (ref 0–1)
BUN SERPL-MCNC: 22 MG/DL (ref 5–25)
CALCIUM SERPL-MCNC: 8.5 MG/DL (ref 8.4–10.2)
CHLORIDE SERPL-SCNC: 102 MMOL/L (ref 96–108)
CO2 SERPL-SCNC: 27 MMOL/L (ref 21–32)
CREAT SERPL-MCNC: 1.15 MG/DL (ref 0.6–1.3)
EOSINOPHIL # BLD AUTO: 0.45 THOUSAND/ÂΜL (ref 0–0.61)
EOSINOPHIL NFR BLD AUTO: 4 % (ref 0–6)
ERYTHROCYTE [DISTWIDTH] IN BLOOD BY AUTOMATED COUNT: 13.3 % (ref 11.6–15.1)
FRACTIONAL SHORTENING: 30 (ref 28–44)
GFR SERPL CREATININE-BSD FRML MDRD: 54 ML/MIN/1.73SQ M
GLUCOSE SERPL-MCNC: 89 MG/DL (ref 65–140)
HCT VFR BLD AUTO: 30.2 % (ref 36.5–49.3)
HGB BLD-MCNC: 10.2 G/DL (ref 12–17)
IMM GRANULOCYTES # BLD AUTO: 0.04 THOUSAND/UL (ref 0–0.2)
IMM GRANULOCYTES NFR BLD AUTO: 0 % (ref 0–2)
INTERVENTRICULAR SEPTUM IN DIASTOLE (PARASTERNAL SHORT AXIS VIEW): 1.5 CM
INTERVENTRICULAR SEPTUM: 1.5 CM (ref 0.6–1.1)
LAAS-AP2: 26.8 CM2
LAAS-AP4: 25.1 CM2
LEFT ATRIUM SIZE: 3.9 CM
LEFT ATRIUM VOLUME (MOD BIPLANE): 84 ML
LEFT INTERNAL DIMENSION IN SYSTOLE: 2.3 CM (ref 2.1–4)
LEFT VENTRICULAR INTERNAL DIMENSION IN DIASTOLE: 3.3 CM (ref 3.5–6)
LEFT VENTRICULAR POSTERIOR WALL IN END DIASTOLE: 1 CM
LEFT VENTRICULAR STROKE VOLUME: 26 ML
LVSV (TEICH): 26 ML
LYMPHOCYTES # BLD AUTO: 1.22 THOUSANDS/ÂΜL (ref 0.6–4.47)
LYMPHOCYTES NFR BLD AUTO: 12 % (ref 14–44)
MCH RBC QN AUTO: 31.5 PG (ref 26.8–34.3)
MCHC RBC AUTO-ENTMCNC: 33.8 G/DL (ref 31.4–37.4)
MCV RBC AUTO: 93 FL (ref 82–98)
MITRAL REGURGITATION PEAK VELOCITY: 5.41 M/S
MITRAL VALVE MEAN INFLOW VELOCITY: 4.26 M/S
MITRAL VALVE REGURGITANT PEAK GRADIENT: 117 MMHG
MONOCYTES # BLD AUTO: 1.09 THOUSAND/ÂΜL (ref 0.17–1.22)
MONOCYTES NFR BLD AUTO: 10 % (ref 4–12)
NEUTROPHILS # BLD AUTO: 7.66 THOUSANDS/ÂΜL (ref 1.85–7.62)
NEUTS SEG NFR BLD AUTO: 74 % (ref 43–75)
NRBC BLD AUTO-RTO: 0 /100 WBCS
PLATELET # BLD AUTO: 324 THOUSANDS/UL (ref 149–390)
PMV BLD AUTO: 9.3 FL (ref 8.9–12.7)
POTASSIUM SERPL-SCNC: 3.1 MMOL/L (ref 3.5–5.3)
PROCALCITONIN SERPL-MCNC: 0.05 NG/ML
RA PRESSURE ESTIMATED: 5 MMHG
RBC # BLD AUTO: 3.24 MILLION/UL (ref 3.88–5.62)
RIGHT ATRIUM AREA SYSTOLE A4C: 19.5 CM2
RIGHT VENTRICLE ID DIMENSION: 4 CM
RV PSP: 57 MMHG
SL CV DOP CALC MV VTI RETROGRADE: 170.4 CM
SL CV LEFT ATRIUM LENGTH A2C: 6.3 CM
SL CV LV EF: 65
SL CV MV MEAN GRADIENT RETROGRADE: 81 MMHG
SL CV PED ECHO LEFT VENTRICLE DIASTOLIC VOLUME (MOD BIPLANE) 2D: 45 ML
SL CV PED ECHO LEFT VENTRICLE SYSTOLIC VOLUME (MOD BIPLANE) 2D: 19 ML
SODIUM SERPL-SCNC: 139 MMOL/L (ref 135–147)
T4 FREE SERPL-MCNC: 1.57 NG/DL (ref 0.61–1.12)
TR MAX PG: 52 MMHG
TR PEAK VELOCITY: 3.6 M/S
TRICUSPID ANNULAR PLANE SYSTOLIC EXCURSION: 1.8 CM
TRICUSPID VALVE PEAK REGURGITATION VELOCITY: 3.59 M/S
WBC # BLD AUTO: 10.5 THOUSAND/UL (ref 4.31–10.16)

## 2023-08-28 PROCEDURE — 99232 SBSQ HOSP IP/OBS MODERATE 35: CPT | Performed by: PHYSICIAN ASSISTANT

## 2023-08-28 PROCEDURE — 70553 MRI BRAIN STEM W/O & W/DYE: CPT

## 2023-08-28 PROCEDURE — 80048 BASIC METABOLIC PNL TOTAL CA: CPT | Performed by: INTERNAL MEDICINE

## 2023-08-28 PROCEDURE — A9585 GADOBUTROL INJECTION: HCPCS | Performed by: INTERNAL MEDICINE

## 2023-08-28 PROCEDURE — G1004 CDSM NDSC: HCPCS

## 2023-08-28 PROCEDURE — 93306 TTE W/DOPPLER COMPLETE: CPT | Performed by: INTERNAL MEDICINE

## 2023-08-28 PROCEDURE — 84145 PROCALCITONIN (PCT): CPT | Performed by: PHYSICIAN ASSISTANT

## 2023-08-28 PROCEDURE — 99223 1ST HOSP IP/OBS HIGH 75: CPT | Performed by: STUDENT IN AN ORGANIZED HEALTH CARE EDUCATION/TRAINING PROGRAM

## 2023-08-28 PROCEDURE — 85025 COMPLETE CBC W/AUTO DIFF WBC: CPT | Performed by: INTERNAL MEDICINE

## 2023-08-28 PROCEDURE — 93306 TTE W/DOPPLER COMPLETE: CPT

## 2023-08-28 RX ORDER — POTASSIUM CHLORIDE 20MEQ/15ML
40 LIQUID (ML) ORAL ONCE
Status: COMPLETED | OUTPATIENT
Start: 2023-08-28 | End: 2023-08-28

## 2023-08-28 RX ORDER — GADOBUTROL 604.72 MG/ML
6 INJECTION INTRAVENOUS
Status: COMPLETED | OUTPATIENT
Start: 2023-08-28 | End: 2023-08-28

## 2023-08-28 RX ORDER — DIPHENHYDRAMINE HCL 25 MG
50 TABLET ORAL EVERY 6 HOURS PRN
Status: DISCONTINUED | OUTPATIENT
Start: 2023-08-28 | End: 2023-08-31 | Stop reason: HOSPADM

## 2023-08-28 RX ORDER — ALPRAZOLAM 0.25 MG/1
0.25 TABLET ORAL ONCE
Status: DISCONTINUED | OUTPATIENT
Start: 2023-08-28 | End: 2023-08-31 | Stop reason: HOSPADM

## 2023-08-28 RX ADMIN — GADOBUTROL 6 ML: 604.72 INJECTION INTRAVENOUS at 14:53

## 2023-08-28 RX ADMIN — HEPARIN SODIUM 5000 UNITS: 5000 INJECTION INTRAVENOUS; SUBCUTANEOUS at 06:11

## 2023-08-28 RX ADMIN — VALACYCLOVIR 1000 MG: 500 TABLET, FILM COATED ORAL at 10:13

## 2023-08-28 RX ADMIN — TAMSULOSIN HYDROCHLORIDE 0.4 MG: 0.4 CAPSULE ORAL at 15:42

## 2023-08-28 RX ADMIN — LEVOTHYROXINE SODIUM 88 MCG: 88 TABLET ORAL at 10:13

## 2023-08-28 RX ADMIN — ACETAMINOPHEN 650 MG: 325 TABLET, FILM COATED ORAL at 21:03

## 2023-08-28 RX ADMIN — DIPHENHYDRAMINE HYDROCHLORIDE 50 MG: 25 TABLET ORAL at 23:38

## 2023-08-28 RX ADMIN — ATORVASTATIN CALCIUM 40 MG: 40 TABLET, FILM COATED ORAL at 15:42

## 2023-08-28 RX ADMIN — POTASSIUM CHLORIDE 40 MEQ: 1.5 SOLUTION ORAL at 10:17

## 2023-08-28 RX ADMIN — FUROSEMIDE 40 MG: 10 INJECTION, SOLUTION INTRAMUSCULAR; INTRAVENOUS at 10:13

## 2023-08-28 RX ADMIN — METOPROLOL SUCCINATE 25 MG: 25 TABLET, FILM COATED, EXTENDED RELEASE ORAL at 10:13

## 2023-08-28 RX ADMIN — SENNOSIDES AND DOCUSATE SODIUM 1 TABLET: 50; 8.6 TABLET ORAL at 21:03

## 2023-08-28 RX ADMIN — ASPIRIN 81 MG: 81 TABLET, COATED ORAL at 10:13

## 2023-08-28 RX ADMIN — HEPARIN SODIUM 5000 UNITS: 5000 INJECTION INTRAVENOUS; SUBCUTANEOUS at 21:03

## 2023-08-28 RX ADMIN — VITAM B12 100 MCG: 100 TAB at 10:13
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
## 2023-08-28 NOTE — PLAN OF CARE
Problem: Potential for Falls  Goal: Patient will remain free of falls  Description: INTERVENTIONS:  - Educate patient/family on patient safety including physical limitations  - Instruct patient to call for assistance with activity   - Consult OT/PT to assist with strengthening/mobility   - Keep Call bell within reach  - Keep bed low and locked with side rails adjusted as appropriate  - Keep care items and personal belongings within reach  - Initiate and maintain comfort rounds  - Make Fall Risk Sign visible to staff  - Offer Toileting every 2  Hours, in advance of need  - Initiate/Maintain  bed and chair alarm  - Apply yellow socks and bracelet for high fall risk patients  - Consider moving patient to room near nurses station  Outcome: Progressing     Problem: MOBILITY - ADULT  Goal: Maintain or return to baseline ADL function  Description: INTERVENTIONS:  -  Assess patient's ability to carry out ADLs; assess patient's baseline for ADL function and identify physical deficits which impact ability to perform ADLs (bathing, care of mouth/teeth, toileting, grooming, dressing, etc.)  - Assess/evaluate cause of self-care deficits   - Assess range of motion  - Assess patient's mobility; develop plan if impaired  - Assess patient's need for assistive devices and provide as appropriate  - Encourage maximum independence but intervene and supervise when necessary  - Involve family in performance of ADLs  - Assess for home care needs following discharge   - Consider OT consult to assist with ADL evaluation and planning for discharge  - Provide patient education as appropriate  Outcome: Progressing  Goal: Maintains/Returns to pre admission functional level  Description: INTERVENTIONS:  - Perform BMAT or MOVE assessment daily.   - Set and communicate daily mobility goal to care team and patient/family/caregiver.    - Collaborate with rehabilitation services on mobility goals if consulted  - Perform Range of Motion  3 times a day.  - Reposition patient every 2  hours.   - Dangle patient  3  times a day  - Stand patient  3  times a day  - Ambulate patient  3  times a day  - Out of bed to chair  3  times a day   - Out of bed for meals  3  times a day  - Out of bed for toileting  - Record patient progress and toleration of activity level   Outcome: Progressing     Problem: PAIN - ADULT  Goal: Verbalizes/displays adequate comfort level or baseline comfort level  Description: Interventions:  - Encourage patient to monitor pain and request assistance  - Assess pain using appropriate pain scale  - Administer analgesics based on type and severity of pain and evaluate response  - Implement non-pharmacological measures as appropriate and evaluate response  - Consider cultural and social influences on pain and pain management  - Notify physician/advanced practitioner if interventions unsuccessful or patient reports new pain  Outcome: Progressing     Problem: INFECTION - ADULT  Goal: Absence or prevention of progression during hospitalization  Description: INTERVENTIONS:  - Assess and monitor for signs and symptoms of infection  - Monitor lab/diagnostic results  - Administer medications as ordered  - Instruct and encourage patient and family to use good hand hygiene technique    Outcome: Progressing  Goal: Absence of fever/infection during neutropenic period  Description: INTERVENTIONS:  - Monitor WBC    Outcome: Progressing     Problem: SAFETY ADULT  Goal: Patient will remain free of falls  Description: INTERVENTIONS:  - Educate patient/family on patient safety including physical limitations  - Instruct patient to call for assistance with activity   - Consult OT/PT to assist with strengthening/mobility   - Keep Call bell within reach  - Keep bed low and locked with side rails adjusted as appropriate  - Keep care items and personal belongings within reach  - Initiate and maintain comfort rounds  - Make Fall Risk Sign visible to staff  - Offer Toileting every 2  Hours, in advance of need  - Initiate/Maintain  bed and chair alarm  - Apply yellow socks and bracelet for high fall risk patients  - Consider moving patient to room near nurses station  Outcome: Progressing  Goal: Maintain or return to baseline ADL function  Description: INTERVENTIONS:  -  Assess patient's ability to carry out ADLs; assess patient's baseline for ADL function and identify physical deficits which impact ability to perform ADLs (bathing, care of mouth/teeth, toileting, grooming, dressing, etc.)  - Assess/evaluate cause of self-care deficits   - Assess range of motion  - Assess patient's mobility; develop plan if impaired  - Assess patient's need for assistive devices and provide as appropriate  - Encourage maximum independence but intervene and supervise when necessary  - Involve family in performance of ADLs  - Assess for home care needs following discharge   - Consider OT consult to assist with ADL evaluation and planning for discharge  - Provide patient education as appropriate  Outcome: Progressing  Goal: Maintains/Returns to pre admission functional level  Description: INTERVENTIONS:  - Perform BMAT or MOVE assessment daily.   - Set and communicate daily mobility goal to care team and patient/family/caregiver. - Collaborate with rehabilitation services on mobility goals if consulted  - Perform Range of Motion 3 times a day. - Reposition patient every 2  hours.   - Dangle patient 3  times a day  - Stand patient 3  times a day  - Ambulate patient 3  times a day  - Out of bed to chair 3  times a day   - Out of bed for meals 3  times a day  - Out of bed for toileting  - Record patient progress and toleration of activity level   Outcome: Progressing     Problem: DISCHARGE PLANNING  Goal: Discharge to home or other facility with appropriate resources  Description: INTERVENTIONS:  - Identify barriers to discharge w/patient and caregiver  - Arrange for needed discharge resources and transportation as appropriate  - Identify discharge learning needs (meds, wound care, etc.)  - Arrange for interpretive services to assist at discharge as needed  - Refer to Case Management Department for coordinating discharge planning if the patient needs post-hospital services based on physician/advanced practitioner order or complex needs related to functional status, cognitive ability, or social support system  Outcome: Progressing     Problem: Knowledge Deficit  Goal: Patient/family/caregiver demonstrates understanding of disease process, treatment plan, medications, and discharge instructions  Description: Complete learning assessment and assess knowledge base.   Interventions:  - Provide teaching at level of understanding  - Provide teaching via preferred learning methods  Outcome: Progressing     Problem: SKIN/TISSUE INTEGRITY - ADULT  Goal: Incision(s), wounds(s) or drain site(s) healing without S/S of infection  Description: INTERVENTIONS  - Assess and document dressing, incision, wound bed, drain sites and surrounding tissue  - Provide patient and family education  - Perform skin care/dressing changes every shift   Outcome: Progressing
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
## 2023-08-28 NOTE — ASSESSMENT & PLAN NOTE
· Recently started on Valtrex for shingles eruption on RLE and right lower back  · Started 8/18/23, no need for further treatment  · D/C further Valtrex

## 2023-08-28 NOTE — ASSESSMENT & PLAN NOTE
· TSH elevated 5.5, T4 elevated 1.57 this admission  · Recent admission with TSH elevated 16, although nl T4  · Continue home levothyroxine 88 mcg daily for now  · Check TSH/T4 again in a.m., may need dose adjustment  · Repeat TSH/T4 in 4-6 weeks at discharge

## 2023-08-28 NOTE — ASSESSMENT & PLAN NOTE
· CT abdomen: 1.8 cm enhancing mass in the urinary bladder concerning for neoplasm.    · UA with 4-10 RBCs, although patient denies hematuria  · Urology consulted, awaiting recommendations
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
## 2023-08-28 NOTE — ASSESSMENT & PLAN NOTE
Evidence of volume overload small bilateral pleural effusions on imaging, elevated . · Likely iatrogenic volume overload 2/2 IVFs received during recent admission for ROMAN  · Echo: EF 65%, mild asymmetric hypertrophy. Moderately dilated LA. Mild-moderate MV regurgitation. Mild TV regurgitation. The estimated right ventricular systolic pressure is 57 mmHg.   · Similar, if not improved from prior Echo in 2018  · C/w IV Lasix 40 mg daily for now  · Daily weights, I/Os

## 2023-08-28 NOTE — ASSESSMENT & PLAN NOTE
Acute urinary retention on arrival, Easton inserted in ED with immediate removal of 900 cc clear urine.   · Maintain Easton catheter  · Continue Flomax  · Urology consulted, recommendations appreciated

## 2023-08-28 NOTE — PLAN OF CARE
Problem: Potential for Falls  Goal: Patient will remain free of falls  Description: INTERVENTIONS:  - Educate patient/family on patient safety including physical limitations  - Instruct patient to call for assistance with activity   - Consult OT/PT to assist with strengthening/mobility   - Keep Call bell within reach  - Keep bed low and locked with side rails adjusted as appropriate  - Keep care items and personal belongings within reach  - Initiate and maintain comfort rounds  - Make Fall Risk Sign visible to staff  - Offer Toileting every 4 Hours, in advance of need  - Initiate/Maintain bed alarm  - Obtain necessary fall risk management equipment: cane/walker  - Apply yellow socks and bracelet for high fall risk patients  - Consider moving patient to room near nurses station  8/28/2023 0126 by Dejon Cisneros RN  Outcome: Progressing    Problem: MOBILITY - ADULT  Goal: Maintain or return to baseline ADL function  Description: INTERVENTIONS:  -  Assess patient's ability to carry out ADLs; assess patient's baseline for ADL function and identify physical deficits which impact ability to perform ADLs (bathing, care of mouth/teeth, toileting, grooming, dressing, etc.)  - Assess/evaluate cause of self-care deficits   - Assess range of motion  - Assess patient's mobility; develop plan if impaired  - Assess patient's need for assistive devices and provide as appropriate  - Encourage maximum independence but intervene and supervise when necessary  - Involve family in performance of ADLs  - Assess for home care needs following discharge   - Consider OT consult to assist with ADL evaluation and planning for discharge  - Provide patient education as appropriate  8/28/2023 0126 by Dejon Cisneros RN  Outcome: Progressing    Problem: PAIN - ADULT  Goal: Verbalizes/displays adequate comfort level or baseline comfort level  Description: Interventions:  - Encourage patient to monitor pain and request assistance  - Assess pain using appropriate pain scale  - Administer analgesics based on type and severity of pain and evaluate response  - Implement non-pharmacological measures as appropriate and evaluate response  - Consider cultural and social influences on pain and pain management  - Notify physician/advanced practitioner if interventions unsuccessful or patient reports new pain  8/28/2023 0126 by David Russo RN  Outcome: Progressing    Problem: INFECTION - ADULT  Goal: Absence or prevention of progression during hospitalization  Description: INTERVENTIONS:  - Assess and monitor for signs and symptoms of infection  - Monitor lab/diagnostic results  - Monitor all insertion sites, i.e. indwelling lines, tubes, and drains  - Monitor endotracheal if appropriate and nasal secretions for changes in amount and color  - Van appropriate cooling/warming therapies per order  - Administer medications as ordered  - Instruct and encourage patient and family to use good hand hygiene technique  - Identify and instruct in appropriate isolation precautions for identified infection/condition  8/28/2023 0126 by David Russo RN  Outcome: Progressing    Problem: SKIN/TISSUE INTEGRITY - ADULT  Goal: Incision(s), wounds(s) or drain site(s) healing without S/S of infection  Description: INTERVENTIONS  - Assess and document dressing, incision, wound bed, drain sites and surrounding tissue  - Provide patient and family education  - Perform skin care/dressing changes every day  Outcome: Progressing

## 2023-08-29 LAB
ANION GAP SERPL CALCULATED.3IONS-SCNC: 8 MMOL/L
ATRIAL RATE: 73 BPM
BUN SERPL-MCNC: 23 MG/DL (ref 5–25)
CALCIUM SERPL-MCNC: 8.4 MG/DL (ref 8.4–10.2)
CHLORIDE SERPL-SCNC: 99 MMOL/L (ref 96–108)
CO2 SERPL-SCNC: 29 MMOL/L (ref 21–32)
CREAT SERPL-MCNC: 1.37 MG/DL (ref 0.6–1.3)
ERYTHROCYTE [DISTWIDTH] IN BLOOD BY AUTOMATED COUNT: 13.3 % (ref 11.6–15.1)
GFR SERPL CREATININE-BSD FRML MDRD: 44 ML/MIN/1.73SQ M
GLUCOSE SERPL-MCNC: 92 MG/DL (ref 65–140)
HCT VFR BLD AUTO: 30.4 % (ref 36.5–49.3)
HGB BLD-MCNC: 10.2 G/DL (ref 12–17)
MAGNESIUM SERPL-MCNC: 1.8 MG/DL (ref 1.9–2.7)
MCH RBC QN AUTO: 31.6 PG (ref 26.8–34.3)
MCHC RBC AUTO-ENTMCNC: 33.6 G/DL (ref 31.4–37.4)
MCV RBC AUTO: 94 FL (ref 82–98)
PLATELET # BLD AUTO: 343 THOUSANDS/UL (ref 149–390)
PMV BLD AUTO: 9.6 FL (ref 8.9–12.7)
POTASSIUM SERPL-SCNC: 3 MMOL/L (ref 3.5–5.3)
PR INTERVAL: 174 MS
QRS AXIS: -36 DEGREES
QRSD INTERVAL: 108 MS
QT INTERVAL: 491 MS
QTC INTERVAL: 519 MS
RBC # BLD AUTO: 3.23 MILLION/UL (ref 3.88–5.62)
SODIUM SERPL-SCNC: 136 MMOL/L (ref 135–147)
T WAVE AXIS: 15 DEGREES
T4 FREE SERPL-MCNC: 1.1 NG/DL (ref 0.61–1.12)
T4 FREE SERPL-MCNC: 1.27 NG/DL (ref 0.61–1.12)
TSH SERPL DL<=0.05 MIU/L-ACNC: 5.65 UIU/ML (ref 0.45–4.5)
VENTRICULAR RATE: 67 BPM
WBC # BLD AUTO: 9.18 THOUSAND/UL (ref 4.31–10.16)

## 2023-08-29 PROCEDURE — 97167 OT EVAL HIGH COMPLEX 60 MIN: CPT

## 2023-08-29 PROCEDURE — 84439 ASSAY OF FREE THYROXINE: CPT | Performed by: PHYSICIAN ASSISTANT

## 2023-08-29 PROCEDURE — 99232 SBSQ HOSP IP/OBS MODERATE 35: CPT

## 2023-08-29 PROCEDURE — 84443 ASSAY THYROID STIM HORMONE: CPT | Performed by: PHYSICIAN ASSISTANT

## 2023-08-29 PROCEDURE — 85027 COMPLETE CBC AUTOMATED: CPT | Performed by: PHYSICIAN ASSISTANT

## 2023-08-29 PROCEDURE — 83735 ASSAY OF MAGNESIUM: CPT

## 2023-08-29 PROCEDURE — 93010 ELECTROCARDIOGRAM REPORT: CPT | Performed by: INTERNAL MEDICINE

## 2023-08-29 PROCEDURE — 97163 PT EVAL HIGH COMPLEX 45 MIN: CPT

## 2023-08-29 PROCEDURE — 80048 BASIC METABOLIC PNL TOTAL CA: CPT | Performed by: PHYSICIAN ASSISTANT

## 2023-08-29 PROCEDURE — 97530 THERAPEUTIC ACTIVITIES: CPT

## 2023-08-29 RX ORDER — MAGNESIUM SULFATE HEPTAHYDRATE 40 MG/ML
2 INJECTION, SOLUTION INTRAVENOUS ONCE
Status: COMPLETED | OUTPATIENT
Start: 2023-08-29 | End: 2023-08-29

## 2023-08-29 RX ORDER — LANOLIN ALCOHOL/MO/W.PET/CERES
CREAM (GRAM) TOPICAL 3 TIMES DAILY PRN
Status: DISCONTINUED | OUTPATIENT
Start: 2023-08-29 | End: 2023-08-31 | Stop reason: HOSPADM

## 2023-08-29 RX ORDER — POTASSIUM CHLORIDE 14.9 MG/ML
20 INJECTION INTRAVENOUS
Status: COMPLETED | OUTPATIENT
Start: 2023-08-29 | End: 2023-08-29

## 2023-08-29 RX ORDER — POTASSIUM CHLORIDE 20MEQ/15ML
40 LIQUID (ML) ORAL ONCE
Status: COMPLETED | OUTPATIENT
Start: 2023-08-29 | End: 2023-08-29

## 2023-08-29 RX ADMIN — HEPARIN SODIUM 5000 UNITS: 5000 INJECTION INTRAVENOUS; SUBCUTANEOUS at 13:34

## 2023-08-29 RX ADMIN — HEPARIN SODIUM 5000 UNITS: 5000 INJECTION INTRAVENOUS; SUBCUTANEOUS at 05:05

## 2023-08-29 RX ADMIN — METOPROLOL SUCCINATE 25 MG: 25 TABLET, FILM COATED, EXTENDED RELEASE ORAL at 08:32

## 2023-08-29 RX ADMIN — VITAM B12 100 MCG: 100 TAB at 08:32

## 2023-08-29 RX ADMIN — ATORVASTATIN CALCIUM 40 MG: 40 TABLET, FILM COATED ORAL at 17:15

## 2023-08-29 RX ADMIN — POTASSIUM CHLORIDE 20 MEQ: 14.9 INJECTION, SOLUTION INTRAVENOUS at 08:25

## 2023-08-29 RX ADMIN — LEVOTHYROXINE SODIUM 88 MCG: 88 TABLET ORAL at 08:32

## 2023-08-29 RX ADMIN — FUROSEMIDE 40 MG: 10 INJECTION, SOLUTION INTRAMUSCULAR; INTRAVENOUS at 08:28

## 2023-08-29 RX ADMIN — POTASSIUM CHLORIDE 20 MEQ: 14.9 INJECTION, SOLUTION INTRAVENOUS at 10:37

## 2023-08-29 RX ADMIN — SENNOSIDES AND DOCUSATE SODIUM 1 TABLET: 50; 8.6 TABLET ORAL at 21:26

## 2023-08-29 RX ADMIN — TAMSULOSIN HYDROCHLORIDE 0.4 MG: 0.4 CAPSULE ORAL at 17:15

## 2023-08-29 RX ADMIN — HEPARIN SODIUM 5000 UNITS: 5000 INJECTION INTRAVENOUS; SUBCUTANEOUS at 21:26

## 2023-08-29 RX ADMIN — POTASSIUM CHLORIDE 40 MEQ: 1.5 SOLUTION ORAL at 08:32

## 2023-08-29 RX ADMIN — MAGNESIUM SULFATE HEPTAHYDRATE 2 G: 40 INJECTION, SOLUTION INTRAVENOUS at 13:34

## 2023-08-29 RX ADMIN — ASPIRIN 81 MG: 81 TABLET, COATED ORAL at 08:32

## 2023-08-29 NOTE — ASSESSMENT & PLAN NOTE
· TSH elevated 5.5, T4 elevated 1.57 this admission  · Recent admission with TSH elevated 16, although nl T4  · Continue home levothyroxine 88 mcg daily for now  · Repeat TSH/T4 in 4-6 weeks at discharge

## 2023-08-29 NOTE — PHYSICAL THERAPY NOTE
Physical Therapy Cancellation Note       08/29/23 0800   Note Type   Note type Cancelled Session   Cancel Reasons Other   Additional Comments Pt currently receiving care by wound care nurse. Will re-attempt evaluation at a later time, as able.     Harman Kraft, PT, DPT  [place] Licensure #[id number]

## 2023-08-29 NOTE — ASSESSMENT & PLAN NOTE
Evidence of volume overload small bilateral pleural effusions on imaging, elevated . · Likely iatrogenic volume overload 2/2 IVFs received during recent admission for ROMAN  · Echo: EF 65%, mild asymmetric hypertrophy. Moderately dilated LA. Mild-moderate MV regurgitation. Mild TV regurgitation. The estimated right ventricular systolic pressure is 57 mmHg.   · Similar, if not improved from prior Echo in 2018  · Completed 3 days of IV Diuretics with Furosemide  · Monitor off  · Daily weights, I/Os

## 2023-08-29 NOTE — DISCHARGE INSTR - OTHER ORDERS
Skin Care Plan:  1-Right Back Wound: Cleanse area with NSS and pat dry. Apply Xeroform to wound bed and cover with a Large Sacral Allevyn Foam Dressing. Martell with T for Treatment and change every other day other day or PRN  2-Turn/reposition q2h or when medically stable for pressure re-distribution on skin . 3-Elevate heels to offload pressure. 4-Moisturize skin daily with skin nourishing cream  5-Ehob cushion in chair when out of bed. 6-Hydraguard to bilateral heels, bilateral sacro-buttocks, & anterior pelvis and right hip scabs BID and PRN.
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
## 2023-08-29 NOTE — ASSESSMENT & PLAN NOTE
Presented with worsening AMS, patient's son reports decline in mental status over the last several weeks. Brought in from SNF, facility reported decreased urine output and increased sleepiness, but otherwise independent at baseline. · Likely 2/2 acute urinary retention, mild volume overload  · No focal neurological deficits, low suspicion for stroke  · CT head with no acute intracranial abnormality, chronic microangiopathic changes  · MRI Brain: Parenchymal volume loss with white matter changes consistent with chronic microangiopathy.  No mass, hemorrhage or evidence of acute ischemia  · Neurology consulted, recommendations appreciated  · Mentation improved, A&O x3,  irritability  · PT/OT evaluation: Recommending STR

## 2023-08-29 NOTE — CASE MANAGEMENT
Case Management Progress Note    Patient name Byron Stands  Location /-93 MRN [id number]  : 1930 Date 2023       LOS (days): 2  Geometric Mean LOS (GMLOS) (days): 3.40  Days to GMLOS:1.5        OBJECTIVE:        Current admission status: Inpatient  Preferred Pharmacy:   [address] [place] [address], [address] [place] [id number]  Phone: [phone] Fax: [phone]    [facility]  for [address] [facility], [address], [address] [place] [address],[address]  [facility] [address]  Phone: [phone] Fax: [phone]    Primary Care Provider: [place] Zimmer PA-C    Primary Insurance: MEDICARE  Secondary Insurance: COMMERCIAL MISCELLANEOUS    PROGRESS NOTE:    Cm contacted son to obtain [facility] address. Per son, if patient is not able to admit to a SNF in [facility], then he will take patient home to his house located at [address] [facility], [address] [place] [address]. Cm called [facility] and informed that their admission team is reviewing the packet and will contact CM back. They requested Cm's phone number and address. Zia also called [address] [place] [address] for the Aged but was only able to leave a voice message requesting phone call back. Cm will follow.

## 2023-08-29 NOTE — PROGRESS NOTES
[address] [facility]  Progress Note  Name: Nidia Brooks  MRN: [id number]  Unit/Bed#: -01 I Date of Admission: 8/27/2023   Date of Service: 8/29/2023 I Hospital Day: 2    Assessment/Plan   * Encephalopathy  Assessment & Plan  Presented with worsening AMS, patient's son reports decline in mental status over the last several weeks. Brought in from SNF, facility reported decreased urine output and increased sleepiness, but otherwise independent at baseline. · Likely 2/2 acute urinary retention, mild volume overload  · No focal neurological deficits, low suspicion for stroke  · CT head with no acute intracranial abnormality, chronic microangiopathic changes  · MRI Brain: Parenchymal volume loss with white matter changes consistent with chronic microangiopathy. No mass, hemorrhage or evidence of acute ischemia  · Neurology consulted, recommendations appreciated  · Mentation improved, A&O x3,  irritability  · PT/OT evaluation: Recommending STR    Acute urinary retention  Assessment & Plan  Acute urinary retention on arrival, Weber inserted in ED with immediate removal of 900 cc clear urine. · Continue Flomax  · Urology consulted, recommendations appreciated:  · No urgent urological intervention indicated  · Recommend outpatient follow up with Urology for further evaluation of bladder mass  · Maintain weber catheter upon discharge    Bilateral pleural effusion  Assessment & Plan  Evidence of volume overload small bilateral pleural effusions on imaging, elevated . · Likely iatrogenic volume overload 2/2 IVFs received during recent admission for ROMAN  · Echo: EF 65%, mild asymmetric hypertrophy. Moderately dilated LA. Mild-moderate MV regurgitation. Mild TV regurgitation. The estimated right ventricular systolic pressure is 57 mmHg.   · Similar, if not improved from prior Echo in 2018  · Completed 3 days of IV Diuretics with Furosemide  · Monitor off  · Daily weights, I/Os    Hypokalemia  Assessment & Plan  · Likely 2:2 to Diuresis  · Monitor/Replete PRN    Acquired hypothyroidism  Assessment & Plan  · TSH elevated 5.5, T4 elevated 1.57 this admission  · Recent admission with TSH elevated 16, although nl T4  · Continue home levothyroxine 88 mcg daily for now  · Repeat TSH/T4 in 4-6 weeks at discharge    Mass of bladder  Assessment & Plan  · CT abdomen: 1.8 cm enhancing mass in the urinary bladder concerning for neoplasm. · UA with 4-10 RBCs, although patient denies hematuria  · Maintain urinary catheter upon discharge  · Recommend outpatient follow up with Urology    Recent history of herpes zoster  Assessment & Plan  · Recently started on Valtrex for shingles eruption on RLE and right lower back  · Started 8/18/23, no need for further treatment  · D/C further Valtrex  · Continue Contact precaution  · Wound care    Memory loss  Assessment & Plan  · Reported progressive memory loss in history  · Recommend outpatient follow up with Neurology for further evaluation             VTE Pharmacologic Prophylaxis: VTE Score: 3 Moderate Risk (Score 3-4) - Pharmacological DVT Prophylaxis Ordered: heparin. Patient Centered Rounds: I performed bedside rounds with nursing staff today. Discussions with Specialists or Other Care Team Provider: Uro, PT/OT, Neuro    Education and Discussions with Family / Patient: Updated  (son) via phone. Total Time Spent on Date of Encounter in care of patient: 35 minutes This time was spent on one or more of the following: performing physical exam; counseling and coordination of care; obtaining or reviewing history; documenting in the medical record; reviewing/ordering tests, medications or procedures; communicating with other healthcare professionals and discussing with patient's family/caregivers.     Current Length of Stay: 2 day(s)  Current Patient Status: Inpatient   Certification Statement: The patient will continue to require additional inpatient hospital stay due to [facility] placement  Discharge Plan: Anticipate discharge in 24-48 hrs to rehab facility. Code Status: Level 1 - Full Code    Subjective:   Patient alert and oriented x3-4. Patient irritable. Patient states he feels poor today and is very tired. Patient states he just wants to go to bed. Patient denies any active chest pain, shortness of breath, abdominal pain, nausea, or vomiting. Objective:     Vitals:   Temp (24hrs), Av.1 °F (36.7 °C), Min:97.6 °F (36.4 °C), Max:98.8 °F (37.1 °C)    Temp:  [97.6 °F (36.4 °C)-98.8 °F (37.1 °C)] 97.6 °F (36.4 °C)  HR:  [61-79] 79  Resp:  [16-18] 16  BP: (130-163)/(61-90) 141/78  SpO2:  [96 %-97 %] 97 %  Body mass index is 19.64 kg/m². Input and Output Summary (last 24 hours): Intake/Output Summary (Last 24 hours) at 2023 1518  Last data filed at 2023 0601  Gross per 24 hour   Intake 296 ml   Output 3600 ml   Net -3304 ml       Physical Exam:   Physical Exam  Vitals and nursing note reviewed. Constitutional:       General: He is not in acute distress. HENT:      Head: Normocephalic. Nose: Nose normal. No congestion. Mouth/Throat:      Mouth: Mucous membranes are dry. Pharynx: Oropharynx is clear. Cardiovascular:      Rate and Rhythm: Normal rate and regular rhythm. Pulses: Normal pulses. Heart sounds: No murmur heard. Pulmonary:      Effort: Pulmonary effort is normal. No respiratory distress. Abdominal:      General: Bowel sounds are normal. There is no distension. Palpations: Abdomen is soft. Musculoskeletal:         General: Normal range of motion. Cervical back: Normal range of motion. Skin:     General: Skin is warm and dry. Capillary Refill: Capillary refill takes less than 2 seconds. Neurological:      Mental Status: Mental status is at baseline. Motor: Weakness (Generalized) present. Psychiatric:         Mood and Affect: Affect is flat. Speech: Speech normal.         Behavior: Behavior is withdrawn. Behavior is cooperative. Judgment: Judgment is impulsive. Additional Data:     Labs:  Results from last 7 days   Lab Units 08/29/23  0504 08/28/23  0611   WBC Thousand/uL 9.18 10.50*   HEMOGLOBIN g/dL 10.2* 10.2*   HEMATOCRIT % 30.4* 30.2*   PLATELETS Thousands/uL 343 324   NEUTROS PCT %  --  74   LYMPHS PCT %  --  12*   MONOS PCT %  --  10   EOS PCT %  --  4     Results from last 7 days   Lab Units 08/29/23  0504 08/28/23  0611 08/27/23  1429   SODIUM mmol/L 136   < > 137   POTASSIUM mmol/L 3.0*   < > 3.4*   CHLORIDE mmol/L 99   < > 103   CO2 mmol/L 29   < > 25   BUN mg/dL 23   < > 26*   CREATININE mg/dL 1.37*   < > 1.11   ANION GAP mmol/L 8   < > 9   CALCIUM mg/dL 8.4   < > 8.7   ALBUMIN g/dL  --   --  3.4*   TOTAL BILIRUBIN mg/dL  --   --  0.84   ALK PHOS U/L  --   --  71   ALT U/L  --   --  22   AST U/L  --   --  23   GLUCOSE RANDOM mg/dL 92   < > 104    < > = values in this interval not displayed.          Results from last 7 days   Lab Units 08/27/23  1440   POC GLUCOSE mg/dl 112         Results from last 7 days   Lab Units 08/28/23  0611 08/27/23  1429   LACTIC ACID mmol/L  --  0.9   PROCALCITONIN ng/ml 0.05 0.05       Lines/Drains:  Invasive Devices     Peripheral Intravenous Line  Duration           Peripheral IV 08/29/23 Left;Upper;Ventral (anterior) Arm <1 day          Drain  Duration           Urethral Catheter Latex;Straight-tip 16 Fr. 2 days              Urinary Catheter:  Goal for removal: N/A- Discharging with [place]               Imaging: Reviewed radiology reports from this admission including: MRI brain    Recent Cultures (last 7 days):         Last 24 Hours Medication List:   Current Facility-Administered Medications   Medication Dose Route Frequency Provider Last Rate   • acetaminophen  650 mg Oral Q6H PRN Melyssa Guadalupe MD     • ALPRAZolam  0.25 mg Oral Once Brennon Floyd PA-C     • aspirin  81 mg Oral Daily Rosa Hernández MD     • atorvastatin  40 mg Oral Daily With 99 Durga Ramirez MD     • cyanocobalamin  100 mcg Oral Daily Rosa Hernández MD     • diphenhydrAMINE  50 mg Oral Q6H PRN Sheng Solares PA-C     • heparin (porcine)  5,000 Units Subcutaneous [facility] Sejal Ledbetter MD     • levothyroxine  88 mcg Oral Daily Rosa Hernández MD     • lidocaine  1 patch Topical Daily Rosa Hernández MD     • magnesium sulfate  2 g Intravenous Once Candance Heard, CRNP 2 g (08/29/23 1334)   • metoprolol succinate  25 mg Oral Daily Rosa Hernández MD     • nicotine  1 patch Transdermal Daily Rosa Hernández MD     • nitroglycerin  0.4 mg Sublingual Q5 Min PRN Rosa Hernández MD     • ondansetron  4 mg Intravenous Q6H PRN Rosa Hernández MD     • senna-docusate sodium  1 tablet Oral HS Rosa Hernández MD     • tamsulosin  0.4 mg Oral Daily With [facility] Destiny Majano MD     • white petrolatum-mineral oil   Topical TID PRN Sheng Solares PA-C          Today, Patient Was Seen By: Candance Heard, CRNP    **Please Note: This note may have been constructed using a voice recognition system. **

## 2023-08-29 NOTE — ASSESSMENT & PLAN NOTE
Acute urinary retention on arrival, Weber inserted in ED with immediate removal of 900 cc clear urine.   · Continue Flomax  · Urology consulted, recommendations appreciated:  · No urgent urological intervention indicated  · Recommend outpatient follow up with Urology for further evaluation of bladder mass  · Maintain weber catheter upon discharge

## 2023-08-29 NOTE — MALNUTRITION/BMI
This medical record reflects one or more clinical indicators suggestive of malnutrition. Malnutrition Findings:   Adult Malnutrition type: Acute illness  Adult Degree of Malnutrition: Malnutrition of moderate degree  Malnutrition Characteristics: Weight loss, Muscle loss, Fat loss                  360 Statement: Moderate malnutrition in the context of acute illness with energy intake < energy output overtime with confusion as evidenced by wt decrease of 14#/9.5% x 1.5 wks, moderate muscle wasting & fat loss (clavicles, temples, orbitals, cheeks). Will treat with nutrition therapy PO diet, supplements, nutrition therapy. BMI Findings: Body mass index is 21.63 kg/m². See Nutrition note dated 08/28/23 for additional details. Completed nutrition assessment is viewable in the nutrition documentation.

## 2023-08-29 NOTE — PLAN OF CARE
Problem: Potential for Falls  Goal: Patient will remain free of falls  Description: INTERVENTIONS:  - Educate patient/family on patient safety including physical limitations  - Instruct patient to call for assistance with activity   - Consult OT/PT to assist with strengthening/mobility   - Keep Call bell within reach  - Keep bed low and locked with side rails adjusted as appropriate  - Keep care items and personal belongings within reach  - Initiate and maintain comfort rounds  - Make Fall Risk Sign visible to staff  - Offer Toileting every 2  Hours, in advance of need  - Initiate/Maintain  bed and chair alarm  - Apply yellow socks and bracelet for high fall risk patients  - Consider moving patient to room near nurses station  Outcome: Progressing     Problem: MOBILITY - ADULT  Goal: Maintain or return to baseline ADL function  Description: INTERVENTIONS:  -  Assess patient's ability to carry out ADLs; assess patient's baseline for ADL function and identify physical deficits which impact ability to perform ADLs (bathing, care of mouth/teeth, toileting, grooming, dressing, etc.)  - Assess/evaluate cause of self-care deficits   - Assess range of motion  - Assess patient's mobility; develop plan if impaired  - Assess patient's need for assistive devices and provide as appropriate  - Encourage maximum independence but intervene and supervise when necessary  - Involve family in performance of ADLs  - Assess for home care needs following discharge   - Consider OT consult to assist with ADL evaluation and planning for discharge  - Provide patient education as appropriate  Outcome: Progressing  Goal: Maintains/Returns to pre admission functional level  Description: INTERVENTIONS:  - Perform BMAT or MOVE assessment daily.   - Set and communicate daily mobility goal to care team and patient/family/caregiver.    - Collaborate with rehabilitation services on mobility goals if consulted  - Perform Range of Motion  3  times a day.  - Reposition patient every  2 hours.   - Dangle patient  3  times a day  - Stand patient 3  times a day  - Ambulate patient 3    times a day  - Out of bed to chair 3  times a day   - Out of bed for meals 3  times a day  - Out of bed for toileting  - Record patient progress and toleration of activity level   Outcome: Progressing     Problem: PAIN - ADULT  Goal: Verbalizes/displays adequate comfort level or baseline comfort level  Description: Interventions:  - Encourage patient to monitor pain and request assistance  - Assess pain using appropriate pain scale  - Administer analgesics based on type and severity of pain and evaluate response  - Implement non-pharmacological measures as appropriate and evaluate response  - Consider cultural and social influences on pain and pain management  - Notify physician/advanced practitioner if interventions unsuccessful or patient reports new pain  Outcome: Progressing     Problem: INFECTION - ADULT  Goal: Absence or prevention of progression during hospitalization  Description: INTERVENTIONS:  - Assess and monitor for signs and symptoms of infection  - Monitor lab/diagnostic results  - Administer medications as ordered  - Instruct and encourage patient and family to use good hand hygiene technique  - Identify and instruct in appropriate isolation precautions for identified infection/condition  Outcome: Progressing  Goal: Absence of fever/infection during neutropenic period  Description: INTERVENTIONS:  - Monitor WBC    Outcome: Progressing     Problem: SAFETY ADULT  Goal: Patient will remain free of falls  Description: INTERVENTIONS:  - Educate patient/family on patient safety including physical limitations  - Instruct patient to call for assistance with activity   - Consult OT/PT to assist with strengthening/mobility   - Keep Call bell within reach  - Keep bed low and locked with side rails adjusted as appropriate  - Keep care items and personal belongings within reach  - Initiate and maintain comfort rounds  - Make Fall Risk Sign visible to staff  - Offer Toileting every  2  Hours, in advance of need  - Initiate/Maintain  bed and chair alarm  - Apply yellow socks and bracelet for high fall risk patients  - Consider moving patient to room near nurses station  Outcome: Progressing  Goal: Maintain or return to baseline ADL function  Description: INTERVENTIONS:  -  Assess patient's ability to carry out ADLs; assess patient's baseline for ADL function and identify physical deficits which impact ability to perform ADLs (bathing, care of mouth/teeth, toileting, grooming, dressing, etc.)  - Assess/evaluate cause of self-care deficits   - Assess range of motion  - Assess patient's mobility; develop plan if impaired  - Assess patient's need for assistive devices and provide as appropriate  - Encourage maximum independence but intervene and supervise when necessary  - Involve family in performance of ADLs  - Assess for home care needs following discharge   - Consider OT consult to assist with ADL evaluation and planning for discharge  - Provide patient education as appropriate  Outcome: Progressing  Goal: Maintains/Returns to pre admission functional level  Description: INTERVENTIONS:  - Perform BMAT or MOVE assessment daily.   - Set and communicate daily mobility goal to care team and patient/family/caregiver. - Collaborate with rehabilitation services on mobility goals if consulted  - Perform Range of Motion 3 times a day. - Reposition patient every 2  hours.   - Dangle patient  3 times a day  - Stand patient  3  times a day  - Ambulate patient  3  times a day  - Out of bed to chair 3  times a day   - Out of bed for meals  3  times a day  - Out of bed for toileting  - Record patient progress and toleration of activity level   Outcome: Progressing     Problem: DISCHARGE PLANNING  Goal: Discharge to home or other facility with appropriate resources  Description: INTERVENTIONS:  - Identify barriers to discharge w/patient and caregiver  - Arrange for needed discharge resources and transportation as appropriate  - Identify discharge learning needs (meds, wound care, etc.)  - Arrange for interpretive services to assist at discharge as needed  - Refer to Case Management Department for coordinating discharge planning if the patient needs post-hospital services based on physician/advanced practitioner order or complex needs related to functional status, cognitive ability, or social support system  Outcome: Progressing     Problem: Knowledge Deficit  Goal: Patient/family/caregiver demonstrates understanding of disease process, treatment plan, medications, and discharge instructions  Description: Complete learning assessment and assess knowledge base.   Interventions:  - Provide teaching at level of understanding  - Provide teaching via preferred learning methods  Outcome: Progressing     Problem: SKIN/TISSUE INTEGRITY - ADULT  Goal: Incision(s), wounds(s) or drain site(s) healing without S/S of infection  Description: INTERVENTIONS  - Assess and document dressing, incision, wound bed, drain sites and surrounding tissue  - Provide patient and family education  - Perform skin care/dressing changes every shift   Outcome: Progressing

## 2023-08-29 NOTE — ASSESSMENT & PLAN NOTE
· Recently started on Valtrex for shingles eruption on RLE and right lower back  · Started 8/18/23, no need for further treatment  · D/C further Valtrex  · Continue Contact precaution  · Wound care

## 2023-08-29 NOTE — PHYSICAL THERAPY NOTE
PHYSICAL THERAPY Evaluation  DATE: 08/29/23  TIME: 5300-3837    NAME:  Noelle Townsend  AGE:   80 y.o. Mrn:   [id number]  Length Of Stay: 2    ADMIT DX:  Urinary retention [R33.9]  CHF (congestive heart failure) ([address]) [I50.9]  Altered mental state [R41.82]  Herpes zoster [B02.9]    Past Medical History:   Diagnosis Date    Arthritis     Coronary artery disease     Disease of thyroid gland     Heart disease     Hypertension      Past Surgical History:   Procedure Laterality Date    CARDIAC SURGERY      HERNIA REPAIR          08/29/23 0902   PT Last Visit   PT Visit Date 08/29/23   Note Type   Note type Evaluation   Pain Assessment   Pain Score 4   Pain Location/Orientation Orientation: Bilateral;Location: Foot   Pain Onset/Description Onset: Ongoing  (hypersensitive to light touch or pressure)   Effect of Pain on Daily Activities does not limit pt's tolerance for mobility or standing   Hospital Pain Intervention(s) Rest  (avoidance of pressure, extra time for patient complete tasks.)   Restrictions/Precautions   Weight Bearing Precautions Per Order No   Other Precautions Pain; Fall Risk;Cognitive; Bed Alarm   Home Living   Additional Comments Pt was living alone in a mobile home, 5 steps to enter with hand rail.  walk-in shower with bar, standard toilet, flat bed. However, he reports he intends to move in with his son upon discharge into a 4th story apartment building. Unable to provide details about that living arrangement   Prior Function   Comments Prior to recent admissions: Pt was independent with ADL, ambualted with SPC. Following discharge to SNF, pt reportedly mostly bedbound. General   Additional Pertinent History 81 y/o male presents to [facility] from SNF on 8/27 due to AMS. Pt reportedly has had decreased urine output. Upon assessment: distended bladder, CXR katelynn effusions, elevated troponin, anemic, CT abdomen shown bladder mass. Dx: encephalopathy, urinary retention, katelynn pleural effusion. Recently admitted after a fall and treated for shingles and ROMAN. hx: CAD, cardiac sx, mild cognitive impairment/memory loss. Family/Caregiver Present No   Cognition   Overall Cognitive Status WFL   Arousal/Participation Arousable   Orientation Level Oriented X4   Following Commands Follows one step commands without difficulty   Subjective   Subjective Pt appearing agitated and expressing little interest in OOB activity, but motivated with the goal to discharge home. Pt primary complaint of being tired and having severe hypersensitivity katelynn foot pain. RUE Assessment   RUE Assessment WFL   LUE Assessment   LUE Assessment WFL   RLE Assessment   RLE Assessment WFL  (foot/knee not formally tested to due report of hypersensitivity pain.  grossly >3/5.)   LLE Assessment   LLE Assessment WFL  (foot/knee not formally tested to due report of hypersensitivity pain.  grossly >3/5.)   Coordination   Movements are Fluid and Coordinated 0   Coordination and Movement Description coordination of functional tasks, transfers, and sitting balance appeared limited due to generalized stiffness and decreased tolerance this session, vs effort. Sensation X  (significant pain hypersensitivity reported of katelynn feet.)   Bed Mobility   Rolling R 4  Minimal assistance   Additional items Bedrails; Increased time required;LE management;Verbal cues  (questionable effort, but pt did perform most of the transfer. needing assist at trunk as he utilized bed rail from flat bed.)   Supine to Sit 4  Minimal assistance   Additional items Bedrails; Increased time required;Verbal cues;LE management  (questionable effort, but pt did perform most of the transfer. needing assist at trunk as he utilized bed rail from flat bed.)   Sit to Supine 4  Minimal assistance   Additional items Assist x 1;LE management;Verbal cues; Increased time required  (cues for repositioning)   Additional Comments Pt exhibits generalized weakness, slowed pace, and generalized stiffness which seemed to limit his ability to perform and manage control early in the session. Movements became more manageable and controlled with activity, until fatigue set in   Transfers   Sit to Stand 4  Minimal assistance   Additional items Assist x 1; Increased time required;Verbal cues; Impulsive  (Pt performed transfer 3x, each time attempting to pull to standing from walker. each time requiring cues for setup, hand placement, and technique. min A to ensure safety and manage control/balance of transfer.)   Stand to Sit 4  Minimal assistance   Additional items Assist x 1; Armrests; Increased time required; Impulsive;Verbal cues  (pt required cues and assist for setup and ensure proper use of UEs to control decent.)   Additional Comments in sittting, pt initiatlly exhibiting balance and some ability to weight shift and correct. Following attempt to don socks pt appeared fatigued and kept having left lateral LOB, despite cues and corrections. LOB was not seen in standing   Ambulation/Elevation   Gait pattern Forward Flexion  (Pt exhibits flexed posture with downward gaze despite constant cues. slightly shortened step length, but no significant LOB.)   Gait Assistance 4  Minimal assist   Additional items Assist x 1;Verbal cues; Tactile cues   Assistive Device Rolling walker   Distance 15'x2   Ambulation/Elevation Additional Comments Pt required cues and assistance for walker management, motivation, postural corrections. Pt tolerated standing/walking for about 3-4 minutes prior to exhibiting significant LE fatigue and having to have a seated rest break.    Balance   Static Sitting Fair  (balance variable depending on fatigue vs effort)   Dynamic Sitting Fair   Static Standing Fair -  (FWW)   Dynamic Standing Fair -  (FWW)   Ambulatory Fair -  (FWW)   Endurance Deficit   Endurance Deficit Yes   Endurance Deficit Description Pt tolerated standing/ambulating for 3-4 minutes prior to displaying LE weakness and fatigue, causing potential for instability and needing seated rest break. Activity Tolerance   Activity Tolerance Patient limited by fatigue;Patient limited by pain;Treatment limited secondary to agitation   Medical Staff Made Aware spoke with OT, CC, nursing   Assessment   Prognosis Fair   Problem List Decreased strength;Decreased range of motion;Decreased endurance; Impaired balance;Decreased mobility; Impaired judgement;Decreased safety awareness;Pain; Impaired sensation   Assessment Orders for PT eval and treat received. Pt exhibits physical deficits noted in problem list above. Deficits listed contribute to functional limitations that are significant from the patient PLOF and include: difficulty with bed mobility, impaired sitting balance, impaired standing balance, inability to perform safe transfers, tolerance for OOB functional activities, unsafe/inefficient ambulation and fall risk    During today's session, pt requires max motivation/encouragement. Pt displays increased fatigue, generalized weakness, and decreased tolerance for mobility/activity. Pt remained sitting for 5 minutes before having LOB needing cues and assist to correct. Pt seemed to have difficulty managing sitting balance, but had no such balance issues while in standing with walker. Questionable effort vs deficit. Pt than ambulated around room and into bathroom with FWW, needing cues for walker management and motivation. Pt tolerated about 3-4 minutes total of standing/ambualting activity before noted slight buckling or weakness in his legs, thus pt quickly given a chair to rest.  Following prolonged rest break, pt able to ambulate back to bed without problem. The AM-PAC & Barthel Index outcome tools were used to assist in determining pt safety w/ mobility/self care & appropriate d/c recommendations, see above for scores.  Patient's clinical presentation is unstable/unpredictable due to significant acute change in functional independence, abnormal lab values, significant need for care assistance compared to baseline, altered mental status and complicated social/support system. Considering the patient's PLOF, co-morbidities, acute functional limitations, functional outcome measures, and/or goal to progress functional independence; this patient would benefit from skilled Physical Therapy intervention in the acute care setting. Considerations for next session:initiate HEP, progress positional changes and OOB activity, progress standing tolerance/coordination and ambulation. Barriers to Discharge Inaccessible home environment;Decreased caregiver support   Goals   Patient Goals "I want to go home today"   [facility] Expiration Date 09/08/23   Short Term Goal #1 1) Pt will perform all bed mobility from flat bed, mod I.  2) Pt will perform stand pivot transfer with LRAD, mod I. 3) Pt will ambulate 100' with LRAD, sup A. 4) Pt will perform >10 minutes of standing HEP, with cues. 5) Pt will ascend/descend 5 steps with rail, SBA. Plan   Treatment/Interventions Functional transfer training;Elevations;LE strengthening/ROM; Therapeutic exercise; Endurance training;Cognitive reorientation;Patient/family training;Equipment eval/education;Gait training;Bed mobility   PT Frequency 3-5x/wk   Recommendation   PT Discharge Recommendation Post acute rehabilitation services   AM-PAC Basic Mobility Inpatient   Turning in Flat Bed Without Bedrails 3   Lying on Back to Sitting on Edge of Flat Bed Without Bedrails 3   Moving Bed to Chair 3   Standing Up From Chair Using Arms 3   Walk in Room 2   Climb 3-5 Stairs With Railing 2   Basic Mobility Inpatient Raw Score 16   Basic Mobility Standardized Score 38.32   Highest Level Of Mobility   JH-HLM Goal 5: Stand one or more mins   JH-HLM Achieved 6: Walk 10 steps or more   Barthel Index   Feeding 10   Bathing 0   Grooming Score 0   Dressing Score 5   Bladder Score 0   Bowels Score 10   Toilet Use Score 5 Transfers (Bed/Chair) Score 5   Mobility (Level Surface) Score 0   Stairs Score 0   Barthel Index Score 35   End of Consult   Patient Position at End of Consult Supine;Bed/Chair alarm activated; All needs within reach   End of Consult Comments Pt reports all needs met. The patient's AM-PAC Basic Mobility Inpatient Short Form Raw Score is 16. A Raw score of greater than or equal to 16 suggests the patient may benefit from discharge to home. However, pt presents as a significant fall risk, requiring greater than min A 2 times during ambulating to correct balance before potential fall. Additionally, pt's endurance is very limiting that he would not tolerate being able to take care of his daily needs. Additionally, pt lives alone so he does not have appropriate assistance or a favorable home setup in which to dwell. Pt would benefit from SNF placement at this time, as he appears to display a significant change in his functional status compared to his baseline. Please also refer to the recommendation of the Physical Therapist for safe discharge planning.     Awilda Baeza, PT, DPT  [place] Licensure #[id number]
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
## 2023-08-29 NOTE — PLAN OF CARE
Problem: PHYSICAL THERAPY ADULT  Goal: Performs mobility at highest level of function for planned discharge setting. See evaluation for individualized goals. Description: Treatment/Interventions: Functional transfer training, Elevations, LE strengthening/ROM, Therapeutic exercise, Endurance training, Cognitive reorientation, Patient/family training, Equipment eval/education, Gait training, Bed mobility          See flowsheet documentation for full assessment, interventions and recommendations. Outcome: Progressing  Note: Prognosis: Fair  Problem List: Decreased strength, Decreased range of motion, Decreased endurance, Impaired balance, Decreased mobility, Impaired judgement, Decreased safety awareness, Pain, Impaired sensation  Assessment: Orders for PT eval and treat received. Pt exhibits physical deficits noted in problem list above. Deficits listed contribute to functional limitations that are significant from the patient PLOF and include: difficulty with bed mobility, impaired sitting balance, impaired standing balance, inability to perform safe transfers, tolerance for OOB functional activities, unsafe/inefficient ambulation and fall risk    During today's session, pt requires max motivation/encouragement. Pt displays increased fatigue, generalized weakness, and decreased tolerance for mobility/activity. Pt remained sitting for 5 minutes before having LOB needing cues and assist to correct. Pt seemed to have difficulty managing sitting balance, but had no such balance issues while in standing with walker. Questionable effort vs deficit. Pt than ambulated around room and into bathroom with FWW, needing cues for walker management and motivation. Pt tolerated about 3-4 minutes total of standing/ambualting activity before noted slight buckling or weakness in his legs, thus pt quickly given a chair to rest.  Following prolonged rest break, pt able to ambulate back to bed without problem.      The AM-PAC & Barthel Index outcome tools were used to assist in determining pt safety w/ mobility/self care & appropriate d/c recommendations, see above for scores. Patient's clinical presentation is unstable/unpredictable due to significant acute change in functional independence, abnormal lab values, significant need for care assistance compared to baseline, altered mental status and complicated social/support system. Considering the patient's PLOF, co-morbidities, acute functional limitations, functional outcome measures, and/or goal to progress functional independence; this patient would benefit from skilled Physical Therapy intervention in the acute care setting. Considerations for next session:initiate HEP, progress positional changes and OOB activity, progress standing tolerance/coordination and ambulation. Barriers to Discharge: Inaccessible home environment, Decreased caregiver support     PT Discharge Recommendation: Post acute rehabilitation services    See flowsheet documentation for full assessment.

## 2023-08-29 NOTE — PLAN OF CARE
Problem: Potential for Falls  Goal: Patient will remain free of falls  Description: INTERVENTIONS:  - Educate patient/family on patient safety including physical limitations  - Instruct patient to call for assistance with activity   - Consult OT/PT to assist with strengthening/mobility   - Keep Call bell within reach  - Keep bed low and locked with side rails adjusted as appropriate  - Keep care items and personal belongings within reach  - Initiate and maintain comfort rounds  - Make Fall Risk Sign visible to staff  - Offer Toileting every 2 Hours, in advance of need  - Initiate/Maintain bed alarm  - Obtain necessary fall risk management equipment:   - Apply yellow socks and bracelet for high fall risk patients  - Consider moving patient to room near nurses station  8/28/2023 2250 by Concha Majano RN  Outcome: Progressing  8/28/2023 2248 by Concha Majano RN  Outcome: Progressing     Problem: MOBILITY - ADULT  Goal: Maintain or return to baseline ADL function  Description: INTERVENTIONS:  -  Assess patient's ability to carry out ADLs; assess patient's baseline for ADL function and identify physical deficits which impact ability to perform ADLs (bathing, care of mouth/teeth, toileting, grooming, dressing, etc.)  - Assess/evaluate cause of self-care deficits   - Assess range of motion  - Assess patient's mobility; develop plan if impaired  - Assess patient's need for assistive devices and provide as appropriate  - Encourage maximum independence but intervene and supervise when necessary  - Involve family in performance of ADLs  - Assess for home care needs following discharge   - Consider OT consult to assist with ADL evaluation and planning for discharge  - Provide patient education as appropriate  8/28/2023 2250 by Concha Majano RN  Outcome: Progressing  8/28/2023 2248 by Concha Majano RN  Outcome: Progressing  Goal: Maintains/Returns to pre admission functional level  Description: INTERVENTIONS:  - Perform BMAT or MOVE assessment daily.   - Set and communicate daily mobility goal to care team and patient/family/caregiver. - Collaborate with rehabilitation services on mobility goals if consulted  - Perform Range of Motion 3 times a day. - Reposition patient every 2 hours.   - Dangle patient 3 times a day  - Stand patient 3 times a day  - Ambulate patient 3 times a day  - Out of bed to chair 3 times a day   - Out of bed for meals 3 times a day  - Out of bed for toileting  - Record patient progress and toleration of activity level   8/28/2023 2250 by Yvonne Morelos RN  Outcome: Progressing  8/28/2023 2248 by Yvonne Morelos RN  Outcome: Progressing     Problem: PAIN - ADULT  Goal: Verbalizes/displays adequate comfort level or baseline comfort level  Description: Interventions:  - Encourage patient to monitor pain and request assistance  - Assess pain using appropriate pain scale  - Administer analgesics based on type and severity of pain and evaluate response  - Implement non-pharmacological measures as appropriate and evaluate response  - Consider cultural and social influences on pain and pain management  - Notify physician/advanced practitioner if interventions unsuccessful or patient reports new pain  8/28/2023 2250 by Yvonne Morelos RN  Outcome: Progressing  8/28/2023 2248 by Yvonne Morelos RN  Outcome: Progressing     Problem: INFECTION - ADULT  Goal: Absence or prevention of progression during hospitalization  Description: INTERVENTIONS:  - Assess and monitor for signs and symptoms of infection  - Monitor lab/diagnostic results  - Monitor all insertion sites, i.e. indwelling lines, tubes, and drains  - Monitor endotracheal if appropriate and nasal secretions for changes in amount and color  - [place] appropriate cooling/warming therapies per order  - Administer medications as ordered  - Instruct and encourage patient and family to use good hand hygiene technique  - Identify and instruct in appropriate isolation precautions for identified infection/condition  8/28/2023 2250 by Amber Zarco RN  Outcome: Progressing  8/28/2023 2248 by Amber Zarco RN  Outcome: Progressing  Goal: Absence of fever/infection during neutropenic period  Description: INTERVENTIONS:  - Monitor WBC    8/28/2023 2250 by Amber Zarco RN  Outcome: Progressing  8/28/2023 2248 by Amber Zarco RN  Outcome: Progressing     Problem: SAFETY ADULT  Goal: Patient will remain free of falls  Description: INTERVENTIONS:  - Educate patient/family on patient safety including physical limitations  - Instruct patient to call for assistance with activity   - Consult OT/PT to assist with strengthening/mobility   - Keep Call bell within reach  - Keep bed low and locked with side rails adjusted as appropriate  - Keep care items and personal belongings within reach  - Initiate and maintain comfort rounds  - Make Fall Risk Sign visible to staff  - Offer Toileting every 2 Hours, in advance of need  - Initiate/Maintain bed alarm  - Obtain necessary fall risk management equipment:   - Apply yellow socks and bracelet for high fall risk patients  - Consider moving patient to room near nurses station  8/28/2023 2250 by Amber Zarco RN  Outcome: Progressing  8/28/2023 2248 by Amber Zarco RN  Outcome: Progressing  Goal: Maintain or return to baseline ADL function  Description: INTERVENTIONS:  -  Assess patient's ability to carry out ADLs; assess patient's baseline for ADL function and identify physical deficits which impact ability to perform ADLs (bathing, care of mouth/teeth, toileting, grooming, dressing, etc.)  - Assess/evaluate cause of self-care deficits   - Assess range of motion  - Assess patient's mobility; develop plan if impaired  - Assess patient's need for assistive devices and provide as appropriate  - Encourage maximum independence but intervene and supervise when necessary  - Involve family in performance of ADLs  - Assess for home care needs following discharge   - Consider OT consult to assist with ADL evaluation and planning for discharge  - Provide patient education as appropriate  8/28/2023 2250 by Zaria Jackson RN  Outcome: Progressing  8/28/2023 2248 by Zaria Jackson RN  Outcome: Progressing  Goal: Maintains/Returns to pre admission functional level  Description: INTERVENTIONS:  - Perform BMAT or MOVE assessment daily.   - Set and communicate daily mobility goal to care team and patient/family/caregiver. - Collaborate with rehabilitation services on mobility goals if consulted  - Perform Range of Motion 3 times a day. - Reposition patient every 2 hours.   - Dangle patient 3 times a day  - Stand patient 3 times a day  - Ambulate patient 3 times a day  - Out of bed to chair 3 times a day   - Out of bed for meals 3 times a day  - Out of bed for toileting  - Record patient progress and toleration of activity level   8/28/2023 2250 by Zaria Jackson RN  Outcome: Progressing  8/28/2023 2248 by Zaria Jackson RN  Outcome: Progressing     Problem: DISCHARGE PLANNING  Goal: Discharge to home or other facility with appropriate resources  Description: INTERVENTIONS:  - Identify barriers to discharge w/patient and caregiver  - Arrange for needed discharge resources and transportation as appropriate  - Identify discharge learning needs (meds, wound care, etc.)  - Arrange for interpretive services to assist at discharge as needed  - Refer to Case Management Department for coordinating discharge planning if the patient needs post-hospital services based on physician/advanced practitioner order or complex needs related to functional status, cognitive ability, or social support system  8/28/2023 2250 by Zaria Jackson RN  Outcome: Progressing  8/28/2023 2248 by Zaria Jackson RN  Outcome: Progressing     Problem: Knowledge Deficit  Goal: Patient/family/caregiver demonstrates understanding of disease process, treatment plan, medications, and discharge instructions  Description: Complete learning assessment and assess knowledge base.   Interventions:  - Provide teaching at level of understanding  - Provide teaching via preferred learning methods  8/28/2023 2250 by Rene Mays RN  Outcome: Progressing  8/28/2023 2248 by Rene Mays RN  Outcome: Progressing     Problem: SKIN/TISSUE INTEGRITY - ADULT  Goal: Incision(s), wounds(s) or drain site(s) healing without S/S of infection  Description: INTERVENTIONS  - Assess and document dressing, incision, wound bed, drain sites and surrounding tissue  - Provide patient and family education  - Perform skin care/dressing changes every shift  8/28/2023 2250 by Rene Mays RN  Outcome: Progressing  8/28/2023 2248 by Rene Mays RN  Outcome: Progressing

## 2023-08-29 NOTE — ASSESSMENT & PLAN NOTE
· CT abdomen: 1.8 cm enhancing mass in the urinary bladder concerning for neoplasm.    · UA with 4-10 RBCs, although patient denies hematuria  · Maintain urinary catheter upon discharge  · Recommend outpatient follow up with Urology

## 2023-08-29 NOTE — PLAN OF CARE

## 2023-08-29 NOTE — ASSESSMENT & PLAN NOTE
· Reported progressive memory loss in history  · Recommend outpatient follow up with Neurology for further evaluation

## 2023-08-29 NOTE — WOUND OSTOMY CARE
Consult Note - Wound   Marie Dhillon 80 y.o. male MRN: [id number]  Unit/Bed#: -01 Encounter: [id number]        History and Present Illness:  Patient is a 79 yo male that was admitted to [facility] for treatment of encephalopathy. Patient has a PMH of herpes zoster and ROMAN , CAD , hypertension , hypothyroidism. Patient is a moderate assist for turning and repositioning. Per Primary RN, patient has history of refusing skin checks and care. Patient is incontinent of bowel and bladder is managed via internal urinary catheter. On assessment, patient is lying on regular mattress. Per primary RN, Son reports that patient has history of shingles. Wound Care was consulted for back and abdomen wounds. Assessment Findings:   B/L heels are dry intact and celina with no skin loss or wounds present. Recommend preventative Hydraguard Cream and proper offloading/ repositioning. B/L sacro-buttocks is dry, intact, pink in color and blanches. No skin loss or wounds present. Recommend preventative hydragaurd to area due to incontinence. 1. Right Anterior Pelvic Region & Hip: scattered, irregular well adhered dark red scabs. No skin loss or drainage noted. Recommend hydraguard to area. 2. Right Posterior Back: irregular in shape area of intact well adhered scabs and at least partial thickness skin loss measured together. wound likely related to shingles. Wound bed is 60% dark red scabs and 40% partial thickness skin loss with yellow slough tissue. Ruth Ann-wound is pink in fragile. Scant serosanguineous drainage noted. Recommend xeroform and allevyn foam dressing to area. allevyn foam dressing applied. NP made aware of findings and recommendations. No induration, fluctuance, odor, warmth/temperature differences, redness, or purulence noted to the above noted wounds and skin areas assessed. New dressings applied per orders listed below.  Patient tolerated well- no s/s of non-verbal pain or discomfort observed during the encounter. Bedside nurse aware of plan of care. See flow sheets for more detailed assessment findings. Orders listed below and wound care will continue to follow, call or tiger text with questions. Skin Care Plan:  1-Right Back Wound: Cleanse area with NSS and pat dry. Apply Xeroform to wound bed and cover with a Large Sacral Allevyn Foam Dressing. Martell with T for Treatment and change every other day other day or PRN  2-Turn/reposition q2h or when medically stable for pressure re-distribution on skin . 3-Elevate heels to offload pressure. 4-Moisturize skin daily with skin nourishing cream  5-Ehob cushion in chair when out of bed. 6-Hydraguard to bilateral heels, bilateral sacro-buttocks, & anterior pelvis and right hip scabs BID and PRN. Wounds:  Wound 08/18/23 Back Right; Lower (Active)   Wound Image   08/29/23 0957   Wound Description Beefy red;Yellow;Slough 08/29/23 0957   Ruth Ann-wound Assessment Pink;Fragile 08/29/23 0957   Wound Length (cm) 16 cm 08/29/23 0957   Wound Width (cm) 18 cm 08/29/23 0957   Wound Depth (cm) 0.1 cm 08/29/23 0957   Wound Surface Area (cm^2) 288 cm^2 08/29/23 0957   Wound Volume (cm^3) 28.8 cm^3 08/29/23 0957   Calculated Wound Volume (cm^3) 28.8 cm^3 08/29/23 0957   Change in Wound Size % -5.49 08/29/23 0957   Drainage Amount Scant 08/29/23 0957   Drainage Description Serosanguineous 08/29/23 0957   Non-staged Wound Description Partial thickness 08/29/23 0957   Treatments Cleansed;Irrigation with NSS;Site care 08/29/23 0957   Dressing Non adherent; Foam, Silicon (eg. Allevyn, etc) 08/29/23 0957   Wound packed? No 08/29/23 0957   Dressing Changed New 08/29/23 0957   Patient Tolerance Tolerated well 08/29/23 0957   Dressing Status Clean;Dry; Intact 08/29/23 0957       Wound 08/18/23 Pelvis Anterior;Right (Active)   Wound Image   08/29/23 0847   Wound Description Intact 08/29/23 0847   Ruth Ann-wound Assessment Intact 08/29/23 0847   Wound Length (cm) 0 cm 08/29/23 0847   Wound Width (cm) 0 cm 08/29/23 0847   Wound Depth (cm) 0 cm 08/29/23 0847   Wound Surface Area (cm^2) 0 cm^2 08/29/23 0847   Wound Volume (cm^3) 0 cm^3 08/29/23 0847   Calculated Wound Volume (cm^3) 0 cm^3 08/29/23 0847   Drainage Amount None 08/29/23 0847   Non-staged Wound Description Not applicable 69/89/67 1610   Treatments Site care 08/29/23 0847   Dressing Moisture barrier 08/29/23 0847   Dressing Changed New 08/29/23 0847   Patient Tolerance Tolerated well 08/29/23 0847   Dressing Status Intact 08/29/23 0847               Chitra Guardado RN, BSN, [employer]

## 2023-08-30 ENCOUNTER — TELEPHONE (OUTPATIENT)
Dept: OTHER | Facility: HOSPITAL | Age: 88
End: 2023-08-30

## 2023-08-30 PROBLEM — I49.9 IRREGULAR HEART RATE: Status: ACTIVE | Noted: 2023-08-30

## 2023-08-30 PROBLEM — R33.9 URINARY RETENTION: Status: ACTIVE | Noted: 2023-08-27

## 2023-08-30 PROBLEM — Z91.81 HISTORY OF RECENT FALL: Status: ACTIVE | Noted: 2023-08-30

## 2023-08-30 PROBLEM — B02.9 HERPES ZOSTER WITHOUT COMPLICATION: Status: ACTIVE | Noted: 2023-08-18

## 2023-08-30 LAB
ANION GAP SERPL CALCULATED.3IONS-SCNC: 8 MMOL/L
BUN SERPL-MCNC: 24 MG/DL (ref 5–25)
CALCIUM SERPL-MCNC: 8.5 MG/DL (ref 8.4–10.2)
CHLORIDE SERPL-SCNC: 97 MMOL/L (ref 96–108)
CO2 SERPL-SCNC: 31 MMOL/L (ref 21–32)
CREAT SERPL-MCNC: 1.29 MG/DL (ref 0.6–1.3)
ERYTHROCYTE [DISTWIDTH] IN BLOOD BY AUTOMATED COUNT: 13.4 % (ref 11.6–15.1)
GFR SERPL CREATININE-BSD FRML MDRD: 47 ML/MIN/1.73SQ M
GLUCOSE SERPL-MCNC: 90 MG/DL (ref 65–140)
HCT VFR BLD AUTO: 31.6 % (ref 36.5–49.3)
HGB BLD-MCNC: 10.5 G/DL (ref 12–17)
MAGNESIUM SERPL-MCNC: 2.1 MG/DL (ref 1.9–2.7)
MCH RBC QN AUTO: 31.5 PG (ref 26.8–34.3)
MCHC RBC AUTO-ENTMCNC: 33.2 G/DL (ref 31.4–37.4)
MCV RBC AUTO: 95 FL (ref 82–98)
PLATELET # BLD AUTO: 372 THOUSANDS/UL (ref 149–390)
PMV BLD AUTO: 9.4 FL (ref 8.9–12.7)
POTASSIUM SERPL-SCNC: 3.6 MMOL/L (ref 3.5–5.3)
RBC # BLD AUTO: 3.33 MILLION/UL (ref 3.88–5.62)
SODIUM SERPL-SCNC: 136 MMOL/L (ref 135–147)
WBC # BLD AUTO: 10.45 THOUSAND/UL (ref 4.31–10.16)

## 2023-08-30 PROCEDURE — 83735 ASSAY OF MAGNESIUM: CPT

## 2023-08-30 PROCEDURE — 80048 BASIC METABOLIC PNL TOTAL CA: CPT

## 2023-08-30 PROCEDURE — 97535 SELF CARE MNGMENT TRAINING: CPT

## 2023-08-30 PROCEDURE — 97530 THERAPEUTIC ACTIVITIES: CPT

## 2023-08-30 PROCEDURE — 85027 COMPLETE CBC AUTOMATED: CPT

## 2023-08-30 PROCEDURE — 99223 1ST HOSP IP/OBS HIGH 75: CPT | Performed by: FAMILY MEDICINE

## 2023-08-30 PROCEDURE — 99232 SBSQ HOSP IP/OBS MODERATE 35: CPT

## 2023-08-30 RX ORDER — GABAPENTIN 100 MG/1
100 CAPSULE ORAL
Status: DISCONTINUED | OUTPATIENT
Start: 2023-08-30 | End: 2023-08-31 | Stop reason: HOSPADM

## 2023-08-30 RX ORDER — POTASSIUM CHLORIDE 20MEQ/15ML
20 LIQUID (ML) ORAL ONCE
Status: COMPLETED | OUTPATIENT
Start: 2023-08-30 | End: 2023-08-30

## 2023-08-30 RX ORDER — LEVOTHYROXINE SODIUM 0.07 MG/1
75 TABLET ORAL
Status: DISCONTINUED | OUTPATIENT
Start: 2023-08-30 | End: 2023-08-31 | Stop reason: HOSPADM

## 2023-08-30 RX ORDER — OXYCODONE HYDROCHLORIDE 5 MG/1
2.5 TABLET ORAL ONCE
Status: COMPLETED | OUTPATIENT
Start: 2023-08-30 | End: 2023-08-30

## 2023-08-30 RX ORDER — ACETAMINOPHEN 325 MG/1
975 TABLET ORAL 3 TIMES DAILY
Status: DISCONTINUED | OUTPATIENT
Start: 2023-08-30 | End: 2023-08-31 | Stop reason: HOSPADM

## 2023-08-30 RX ADMIN — ACETAMINOPHEN 975 MG: 325 TABLET, FILM COATED ORAL at 21:22

## 2023-08-30 RX ADMIN — ASPIRIN 81 MG: 81 TABLET, COATED ORAL at 08:37

## 2023-08-30 RX ADMIN — ACETAMINOPHEN 975 MG: 325 TABLET, FILM COATED ORAL at 12:42

## 2023-08-30 RX ADMIN — VITAM B12 100 MCG: 100 TAB at 08:37

## 2023-08-30 RX ADMIN — HEPARIN SODIUM 5000 UNITS: 5000 INJECTION INTRAVENOUS; SUBCUTANEOUS at 14:42

## 2023-08-30 RX ADMIN — HEPARIN SODIUM 5000 UNITS: 5000 INJECTION INTRAVENOUS; SUBCUTANEOUS at 05:57

## 2023-08-30 RX ADMIN — TAMSULOSIN HYDROCHLORIDE 0.4 MG: 0.4 CAPSULE ORAL at 16:42

## 2023-08-30 RX ADMIN — OXYCODONE HYDROCHLORIDE 2.5 MG: 5 TABLET ORAL at 23:32

## 2023-08-30 RX ADMIN — ACETAMINOPHEN 975 MG: 325 TABLET, FILM COATED ORAL at 16:42

## 2023-08-30 RX ADMIN — ATORVASTATIN CALCIUM 40 MG: 40 TABLET, FILM COATED ORAL at 16:42

## 2023-08-30 RX ADMIN — POTASSIUM CHLORIDE 20 MEQ: 1.5 SOLUTION ORAL at 08:37

## 2023-08-30 RX ADMIN — HEPARIN SODIUM 5000 UNITS: 5000 INJECTION INTRAVENOUS; SUBCUTANEOUS at 21:22

## 2023-08-30 RX ADMIN — METOPROLOL SUCCINATE 25 MG: 25 TABLET, FILM COATED, EXTENDED RELEASE ORAL at 08:37

## 2023-08-30 RX ADMIN — LEVOTHYROXINE SODIUM 88 MCG: 88 TABLET ORAL at 08:37

## 2023-08-30 RX ADMIN — DIPHENHYDRAMINE HYDROCHLORIDE 50 MG: 25 TABLET ORAL at 21:23

## 2023-08-30 RX ADMIN — SENNOSIDES AND DOCUSATE SODIUM 1 TABLET: 50; 8.6 TABLET ORAL at 21:22

## 2023-08-30 RX ADMIN — GABAPENTIN 100 MG: 100 CAPSULE ORAL at 21:22

## 2023-08-30 NOTE — ASSESSMENT & PLAN NOTE
Presented with worsening AMS, patient's son reports decline in mental status over the last several weeks. Brought in from SNF, facility reported decreased urine output and increased sleepiness, but otherwise independent at baseline. · Likely 2/2 acute urinary retention, mild volume overload  · No focal neurological deficits, low suspicion for stroke  · CT head with no acute intracranial abnormality, chronic microangiopathic changes  · MRI Brain: Parenchymal volume loss with white matter changes consistent with chronic microangiopathy.  No mass, hemorrhage or evidence of acute ischemia  · Gerontology consulted, recommendations appreciated  · Mentation improved, A&O x3  · PT/OT evaluation: Recommending STR

## 2023-08-30 NOTE — PROGRESS NOTES
[address]  Progress Note  Name: Jay Wheeler  MRN: [id number]  Unit/Bed#: -01 I Date of Admission: 8/27/2023   Date of Service: 8/30/2023 I Hospital Day: 3    Assessment/Plan   * Encephalopathy  Assessment & Plan  Presented with worsening AMS, patient's son reports decline in mental status over the last several weeks. Brought in from SNF, facility reported decreased urine output and increased sleepiness, but otherwise independent at baseline. · Likely 2/2 acute urinary retention, mild volume overload  · No focal neurological deficits, low suspicion for stroke  · CT head with no acute intracranial abnormality, chronic microangiopathic changes  · MRI Brain: Parenchymal volume loss with white matter changes consistent with chronic microangiopathy. No mass, hemorrhage or evidence of acute ischemia  · Gerontology consulted, recommendations appreciated  · Mentation improved, A&O x3  · PT/OT evaluation: Recommending STR    Urinary retention  Assessment & Plan  Acute urinary retention on arrival, Weber inserted in ED with immediate removal of 900 cc clear urine. · Continue Flomax  · Urology consulted, recommendations appreciated:  · No urgent urological intervention indicated  · Recommend outpatient follow up with Urology for further evaluation of bladder mass  · Maintain weber catheter upon discharge    Bilateral pleural effusion  Assessment & Plan  Evidence of volume overload small bilateral pleural effusions on imaging, elevated . · Likely iatrogenic volume overload 2/2 IVFs received during recent admission for ROMAN  · Echo: EF 65%, mild asymmetric hypertrophy. Moderately dilated LA. Mild-moderate MV regurgitation. Mild TV regurgitation. The estimated right ventricular systolic pressure is 57 mmHg.   · Similar, if not improved from prior Echo in 2018  · Completed 3 days of IV Diuretics with Furosemide  · Monitor off  · Continue Daily weights, I/Os    Hypokalemia  Assessment & Plan  · Likely 2:2 to Diuresis  · Monitor/Replete PRN    Acquired hypothyroidism  Assessment & Plan  · TSH elevated 5.5, T4 elevated 1.57 this admission  · Recent admission with TSH elevated 16, although nl T4  · Will decrease levothyroxine 88-> 75 mcg daily for now  · Repeat TSH/T4 in 4-6 weeks at discharge    Mass of bladder  Assessment & Plan  · CT abdomen: 1.8 cm enhancing mass in the urinary bladder concerning for neoplasm. · UA with 4-10 RBCs, although patient denies hematuria  · Maintain urinary catheter upon discharge  · Recommend outpatient follow up with Urology    Recent history of herpes zoster  Assessment & Plan  · Recently started on Valtrex for shingles eruption on RLE and right lower back  · Started 8/18/23, no need for further treatment  · D/C further Valtrex  · Continue Contact precaution  · Wound care    Memory loss  Assessment & Plan  · Reported progressive memory loss in history  · Recommend outpatient follow up with Neurology for further evaluation    Ambulatory dysfunction  Assessment & Plan  · Patient with complaints of LE discomfort  · LLE > RLE  · Venous Duplex: Ordered  · PT/OT recommending ST               VTE Pharmacologic Prophylaxis: VTE Score: 3 Moderate Risk (Score 3-4) - Pharmacological DVT Prophylaxis Ordered: heparin. Patient Centered Rounds: I performed bedside rounds with nursing staff today. Discussions with Specialists or Other Care Team Provider: CM    Education and Discussions with Family / Patient: Updated  (son) via phone.     Total Time Spent on Date of Encounter in care of patient: 45 minutes This time was spent on one or more of the following: performing physical exam; counseling and coordination of care; obtaining or reviewing history; documenting in the medical record; reviewing/ordering tests, medications or procedures; communicating with other healthcare professionals and discussing with patient's family/caregivers. Current Length of Stay: 3 day(s)  Current Patient Status: Inpatient   Certification Statement: The patient will continue to require additional inpatient hospital stay due to [facility] placement  Discharge Plan: Anticipate discharge in 24-48 hrs to rehab facility. Code Status: Level 1 - Full Code    Subjective:   Patient states he feels all right. Patient states he feels overall lousy but better than yesterday. Patient states he is tired and does not sleep well at night. Patient also noted that he has some lower extremity tenderness and cannot touch his legs. Patient denies any active chest pain, shortness of breath, abdominal pain, nausea, vomiting or diarrhea. Objective:     Vitals:   Temp (24hrs), Av.2 °F (36.8 °C), Min:98.2 °F (36.8 °C), Max:98.2 °F (36.8 °C)    Temp:  [98.2 °F (36.8 °C)] 98.2 °F (36.8 °C)  HR:  [64-78] 64  Resp:  [16] 16  BP: (136-153)/(75-77) 153/77  SpO2:  [96 %-98 %] 96 %  Body mass index is 19.69 kg/m². Input and Output Summary (last 24 hours): Intake/Output Summary (Last 24 hours) at 2023 1516  Last data filed at 2023 0451  Gross per 24 hour   Intake 240 ml   Output 2200 ml   Net -1960 ml       Physical Exam:   Physical Exam  Vitals and nursing note reviewed. Constitutional:       General: He is not in acute distress. HENT:      Head: Normocephalic. Nose: Nose normal. No congestion. Mouth/Throat:      Mouth: Mucous membranes are moist.      Pharynx: Oropharynx is clear. Cardiovascular:      Rate and Rhythm: Normal rate and regular rhythm. Pulses: Normal pulses. Heart sounds: No murmur heard. Pulmonary:      Effort: Pulmonary effort is normal. No respiratory distress. Abdominal:      General: Bowel sounds are normal. There is no distension. Palpations: Abdomen is soft. Musculoskeletal:         General: Normal range of motion. Cervical back: Normal range of motion. Right lower leg: No edema. Left lower leg: Edema present. Skin:     General: Skin is warm and dry. Capillary Refill: Capillary refill takes less than 2 seconds. Neurological:      Mental Status: He is alert and oriented to person, place, and time. Motor: Weakness (Generalized) present. Psychiatric:         Mood and Affect: Mood normal.         Speech: Speech normal.         Behavior: Behavior is cooperative. Additional Data:     Labs:  Results from last 7 days   Lab Units 08/30/23  0441 08/29/23  0504 08/28/23  0611   WBC Thousand/uL 10.45*   < > 10.50*   HEMOGLOBIN g/dL 10.5*   < > 10.2*   HEMATOCRIT % 31.6*   < > 30.2*   PLATELETS Thousands/uL 372   < > 324   NEUTROS PCT %  --   --  74   LYMPHS PCT %  --   --  12*   MONOS PCT %  --   --  10   EOS PCT %  --   --  4    < > = values in this interval not displayed. Results from last 7 days   Lab Units 08/30/23  0441 08/28/23  0611 08/27/23  1429   SODIUM mmol/L 136   < > 137   POTASSIUM mmol/L 3.6   < > 3.4*   CHLORIDE mmol/L 97   < > 103   CO2 mmol/L 31   < > 25   BUN mg/dL 24   < > 26*   CREATININE mg/dL 1.29   < > 1.11   ANION GAP mmol/L 8   < > 9   CALCIUM mg/dL 8.5   < > 8.7   ALBUMIN g/dL  --   --  3.4*   TOTAL BILIRUBIN mg/dL  --   --  0.84   ALK PHOS U/L  --   --  71   ALT U/L  --   --  22   AST U/L  --   --  23   GLUCOSE RANDOM mg/dL 90   < > 104    < > = values in this interval not displayed.          Results from last 7 days   Lab Units 08/27/23  1440   POC GLUCOSE mg/dl 112         Results from last 7 days   Lab Units 08/28/23  0611 08/27/23  1429   LACTIC ACID mmol/L  --  0.9   PROCALCITONIN ng/ml 0.05 0.05       Lines/Drains:  Invasive Devices     Peripheral Intravenous Line  Duration           Peripheral IV 08/29/23 Left;Upper;Ventral (anterior) Arm 1 day          Drain  Duration           Urethral Catheter Latex;Straight-tip 16 Fr. 3 days              Urinary Catheter:  Goal for removal: N/A- Discharging with [place]               Imaging: No pertinent imaging reviewed. Recent Cultures (last 7 days):         Last 24 Hours Medication List:   Current Facility-Administered Medications   Medication Dose Route Frequency Provider Last Rate   • acetaminophen  650 mg Oral Q6H PRN Caesar Cabrera MD     • acetaminophen  975 mg Oral TID Madison Salgado MD     • ALPRAZolam  0.25 mg Oral Once Caryn Fry PA-C     • aspirin  81 mg Oral Daily Caesar Cabrera MD     • atorvastatin  40 mg Oral Daily With [address] Shelby Eisenberg MD     • cyanocobalamin  100 mcg Oral Daily Caesar Cabrera MD     • diphenhydrAMINE  50 mg Oral Q6H PRN Darrell Nissen Prator, PA-C     • gabapentin  100 mg Oral HS Madison Salgado MD     • heparin (porcine)  5,000 Units Subcutaneous [facility] Claudia Neely MD     • levothyroxine  75 mcg Oral Early Morning MORAIMA Lopez     • lidocaine  1 patch Topical Daily Caesar Cabrera MD     • metoprolol succinate  25 mg Oral Daily Caesar Cabrera MD     • nicotine  1 patch Transdermal Daily Caesar Cabrera MD     • nitroglycerin  0.4 mg Sublingual Q5 Min PRN Caesar Cabrera MD     • ondansetron  4 mg Intravenous Q6H PRN Caesar Cabrera MD     • senna-docusate sodium  1 tablet Oral HS Caesar Cabrera MD     • tamsulosin  0.4 mg Oral Daily With [address] [place] Shelby Eisenberg MD     • white petrolatum-mineral oil   Topical TID PRN Darrell Nissen Prator, PA-C          Today, Patient Was Seen By: MORAIMA Lopez    **Please Note: This note may have been constructed using a voice recognition system. **
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
## 2023-08-30 NOTE — ASSESSMENT & PLAN NOTE
-prior baseline per son Clari Velez includes walking daily without issue including walking dog and driving  -complains of left foot pain and sensitivity during exam today and son Clari Poncetsman reports noted this new onset foot pain  -physical exam with swollen left leg and pain out of proportion to touch and with minimal movement  -VDE LLE negative for DVT  - continue tylenol ATC and gabapentin

## 2023-08-30 NOTE — ASSESSMENT & PLAN NOTE
-noted on previous hospital admission valtrex discontinued on 8/28/23   -tylenol 975mg TID  -gabapentin 100mg hs  - rash healing , no vesicles noted on exam

## 2023-08-30 NOTE — ASSESSMENT & PLAN NOTE
· TSH elevated 5.5, T4 elevated 1.57 this admission  · Recent admission with TSH elevated 16, although nl T4  · Will decrease levothyroxine 88-> 75 mcg daily for now  · Repeat TSH/T4 in 4-6 weeks at discharge

## 2023-08-30 NOTE — ASSESSMENT & PLAN NOTE
-hx of fall post discharge on 8/23 to rehab facility while going to bathroom no LOC  -Monitor orthostatic vital signs  -Continue PT OT  -Encourage p.o. intake and provide protein supplements

## 2023-08-30 NOTE — ASSESSMENT & PLAN NOTE
-son noted non-adherence to medications otherwise AAOx3  - consider geriatric assessment as outpatient  - provide supportive care and reorient as needed

## 2023-08-30 NOTE — CASE MANAGEMENT
Case Management Progress Note    Patient name Manuel Puga  Location /-27 MRN [id number]  : 1930 Date 2023       LOS (days): 3  Geometric Mean LOS (GMLOS) (days): 3.10  Days to GMLOS:0.3        OBJECTIVE:        Current admission status: Inpatient  Preferred Pharmacy:   [address] [place] [address], [address] [place] [id number]  Phone: [phone] Fax: [phone]    [facility]  for [facility], [address], [address] [place] [address],[address]  [place] [address]  Phone: [phone] Fax: [phone]    Primary Care Provider: Rosalba Mesa PA-C    Primary Insurance: MEDICARE  Secondary Insurance: COMMERCIAL MISCELLANEOUS    PROGRESS NOTE:    Zia continues to work on dc planning. Cm informed by following [facility] that they will need [place] completed prior to admission. Zia was going to email individual who would complete for the network, but she is currently on vacation. Cm reached out to other network leaders to inquire if there is anyone else in network. At this time, there is no one else identified. Cm then contacted facility to inform them of situation. After telling them of patient's brief stay at [facility] for 4days, Zia was told that the facility director would be made aware and to see if building had someone that could complete with CM via phone. Cm will follow up. Zia called back to [facility] to follow up on AM conversation. Zia was able to speak with director there, Savana Canchola, who stated that she is still reviewing packet and will reach out to DON about LYNNE paperwork. Cm will need to call various home healthcare agencies in [address] since there is no positive movement with obtaining an accepting SNF. Cm will also discuss with son about patient's level of assistance. Cm will ask for therapy to see patient again. Cm will follow.

## 2023-08-30 NOTE — PLAN OF CARE
Problem: MOBILITY - ADULT  Goal: Maintain or return to baseline ADL function  Description: INTERVENTIONS:  -  Assess patient's ability to carry out ADLs; assess patient's baseline for ADL function and identify physical deficits which impact ability to perform ADLs (bathing, care of mouth/teeth, toileting, grooming, dressing, etc.)  - Assess/evaluate cause of self-care deficits   - Assess range of motion  - Assess patient's mobility; develop plan if impaired  - Assess patient's need for assistive devices and provide as appropriate  - Encourage maximum independence but intervene and supervise when necessary  - Involve family in performance of ADLs  - Assess for home care needs following discharge   - Consider OT consult to assist with ADL evaluation and planning for discharge  - Provide patient education as appropriate  Outcome: Progressing     Problem: MOBILITY - ADULT  Goal: Maintains/Returns to pre admission functional level  Description: INTERVENTIONS:  - Perform BMAT or MOVE assessment daily.   - Set and communicate daily mobility goal to care team and patient/family/caregiver.    - Collaborate with rehabilitation services on mobility goals if consulted  - Out of bed for toileting  - Record patient progress and toleration of activity level   Outcome: Progressing     Problem: SAFETY ADULT  Goal: Patient will remain free of falls  Description: INTERVENTIONS:  - Educate patient/family on patient safety including physical limitations  - Instruct patient to call for assistance with activity   - Consult OT/PT to assist with strengthening/mobility   - Keep Call bell within reach  - Keep bed low and locked with side rails adjusted as appropriate  - Keep care items and personal belongings within reach  - Initiate and maintain comfort rounds  - Make Fall Risk Sign visible to staff  - Apply yellow socks and bracelet for high fall risk patients  - Consider moving patient to room near nurses station  Outcome: Progressing Problem: SAFETY ADULT  Goal: Maintain or return to baseline ADL function  Description: INTERVENTIONS:  -  Assess patient's ability to carry out ADLs; assess patient's baseline for ADL function and identify physical deficits which impact ability to perform ADLs (bathing, care of mouth/teeth, toileting, grooming, dressing, etc.)  - Assess/evaluate cause of self-care deficits   - Assess range of motion  - Assess patient's mobility; develop plan if impaired  - Assess patient's need for assistive devices and provide as appropriate  - Encourage maximum independence but intervene and supervise when necessary  - Involve family in performance of ADLs  - Assess for home care needs following discharge   - Consider OT consult to assist with ADL evaluation and planning for discharge  - Provide patient education as appropriate  Outcome: Progressing     Problem: SAFETY ADULT  Goal: Maintains/Returns to pre admission functional level  Description: INTERVENTIONS:  - Perform BMAT or MOVE assessment daily.   - Set and communicate daily mobility goal to care team and patient/family/caregiver.    - Collaborate with rehabilitation services on mobility goals if consulted  - Out of bed for toileting  - Record patient progress and toleration of activity level   Outcome: Progressing

## 2023-08-30 NOTE — OCCUPATIONAL THERAPY NOTE
Occupational Therapy Treatment Note     Patient Name: Desiree Calvert  ROSQG'O Date: 8/30/2023  Problem List  Principal Problem:    Encephalopathy  Active Problems:    Acquired hypothyroidism    Ambulatory dysfunction    Memory loss    Herpes zoster without complication    Bilateral pleural effusion    Recent history of herpes zoster    Mass of bladder    Urinary retention    Hypokalemia    Moderate protein-calorie malnutrition ([address])    History of recent fall    Irregular heart rate          08/30/23 1352   OT Last Visit   OT Visit Date 08/30/23   Note Type   Note Type Treatment   Pain Assessment   Pain Assessment Tool 0-10   Pain Score No Pain   Patient's Stated Pain Goal No pain   Hospital Pain Intervention(s) Repositioned; Ambulation/increased activity; Emotional support   Multiple Pain Sites No   Restrictions/Precautions   Weight Bearing Precautions Per Order No   Other Precautions Chair Alarm; Bed Alarm;Cognitive;Multiple lines; Fall Risk;Pain;Contact/isolation  (Herpes Zoster; (+) weber)   Lifestyle   Autonomy At baseline pt is (I) c ADLs/IADLs and  ANDIE for functional mobility with use of SPC. Pt lives admitted from [facility]. Prior to was living in a 1 level mobile home with (+) SAADIA. (+) driving and falls. Reciprocal Relationships One local son and another who lives in [facility]. Service to Others Retired   ADL   Where Assessed Chair   Eating Assistance 5  Supervision/Setup   Eating Deficit Setup; Beverage management   Grooming Assistance 5  Supervision/Setup   Grooming Deficit Setup; Wash/dry hands; Wash/dry face;Brushing hair;Denture care;Verbal cueing   LB Dressing Assistance 2  Maximal Assistance   LB Dressing Deficit Verbal cueing;Supervision/safety; Increased time to complete; Don/doff R sock; Don/doff L sock   LB Dressing Comments pt attempted to independently belkis B socks, however unsucessfull d/t impaired sitting tolerance and functional reach.    Toileting Assistance  Unable to assess  ((+) weber; denies needing to have a BM at this time)   Bed Mobility   Supine to Sit 5  Supervision   Additional items Assist x 1;HOB elevated; Increased time required;Verbal cues; Bedrails   Sit to Supine   (Unable to assess; pt OOB in recliner chair at the end of the session with all needs in reach and chair alarm donned)   Additional Comments Pt tolerated sitting at the EOB for approx ~1 min for attempt to participate in LB dressing. Pt displays impaired trunk stability with a posterior lean. MAX cueing to acheive upright postion with support through BUEs, pt unable to maintain d/t fatigue and weakness. Transfers   Sit to Stand 4  Minimal assistance   Additional items Assist x 1; Increased time required;Verbal cues  (increased effort)   Stand to Sit 3  Moderate assistance   Additional items Assist x 1; Increased time required;Verbal cues;Armrests  (impulsive stand to sit, depite cueing for armrests and safety.)   Stand pivot 4  Minimal assistance   Additional Comments Continued verbal cues for optimal hand placement, pt attempted to pull up on RW. Functional Mobility   Functional Mobility 4  Minimal assistance   Additional Comments variable ALESHA -MODA x 1 for short distance with RW. Pt with retropulsion during ambulation, requiring MODA to maintain upright postion and prevent a fall. Additional items Rolling walker   Subjective   Subjective "Ta-Ta now"   Cognition   Overall Cognitive Status [facility] t/o session, questionable higher level cognition deficits)   Arousal/Participation Alert; Responsive; Cooperative   Attention Attends with cues to redirect   Orientation Level Oriented X4   Memory Decreased recall of precautions;Decreased recall of recent events;Decreased short term memory   Following Commands Follows one step commands without difficulty   Comments Pt with increased motivation and participation in tasks.  Continues to display decreased safety awareness with poor recall of precautions from previous sessions. Activity Tolerance   Activity Tolerance Patient limited by fatigue  (limited by generalized weakness)   Medical Staff Made Aware Spoke with RN [id number]   Assessment   Assessment Patient seen for OT treatment on 8/30/2023 s/p admission for Encephalopathy Patient agreeable to OT session. Patient participated in activity engagement , fall prevention , bed mobility , functional mobility and ADLs with intervention focus on optimizing independence in functional mobility and ADL tasks. Amita Bennett is showing improvements in ADLs, activity tolerance and initiation of tasks but is continuing to perform below baseline due to the following deficits: endurance ,  decreased muscular strength , decreased balance , decreased standing tolerance for self care tasks , decreased dynamic balance impacting functional reach, poor postural control , postural alignment , decreased functional reach , decreased trunk control , decreased activity tolerance , impaired judgement and problem solving  and impaired safety awareness . Personal factors continuing to impact D/C include: (+) Hx of falls , steps to enter/navigate the home, Assistance needed for ADL/IADLs, Assistance needed for ADLs and functional mobility, High fall risk , decreased insight toward deficits  and decreased recall of precautions  From OT standpoint, patient would benefit from skilled intervention to maximize independence with ADLs and functional mobility. Goals remain appropriate, continue POC. At this time, recommending D/C to: post-acute rehabilitation   Plan   Treatment Interventions ADL retraining;Functional transfer training; Endurance training;Patient/family training;Equipment evaluation/education; Compensatory technique education; Energy conservation   Goal Expiration Date 09/08/23   OT Treatment Day 2   OT Frequency 3-5x/wk   Recommendation   OT Discharge Recommendation Post acute rehabilitation services   Additional Comments  The patient's raw score on the AM-PAC Daily Activity Inpatient Short Form is 16. A raw score of less than 19 suggests the patient may benefit from discharge to post-acute rehabilitation services. Please refer to the recommendation of the Occupational Therapist for safe discharge planning. AM-PAC Daily Activity Inpatient   Lower Body Dressing 2   Bathing 2   Toileting 2   Upper Body Dressing 3   Grooming 3   Eating 4   Daily Activity Raw Score 16   Daily Activity Standardized Score (Calc for Raw Score >=11) 35.96   AM-PAC Applied Cognition Inpatient   Following a Speech/Presentation 3   Understanding Ordinary Conversation 4   Taking Medications 4   Remembering Where Things Are Placed or Put Away 3   Remembering List of 4-5 Errands 3   Taking Care of Complicated Tasks 3   Applied Cognition Raw Score 20   Applied Cognition Standardized Score 41.76   Barthel Index   Feeding 10   Bathing 0   Grooming Score 0   Dressing Score 5   Bladder Score 0   Bowels Score 10   Toilet Use Score 5   Transfers (Bed/Chair) Score 5   Mobility (Level Surface) Score 0   Stairs Score 0   Barthel Index Score 35   End of Consult   Education Provided Yes   Patient Position at End of Consult Bedside chair;Bed/Chair alarm activated; All needs within reach   Nurse Communication Nurse aware of consult       Goals established on initial evaluation in order to achieve pt's goal of going home today.       Pt will complete UB ADLs Mod independent   for increased ADL independence within 10 days.      Pt will complete LB ADLs Supervision  for increased ADL independence within 10 days.      Pt will complete toileting Supervision  with use of DME for increased ADL independence within 10 days.      Pt will demonstrate proper body mechanics to complete self-care transfers and functional mobility with Supervision and use of LRAD for increased safety and functional independence within 10 days.      Pt will demonstrate standing tolerance of 5 min with Supervision and use of LRAD for increased activity tolerance during ADL/IADL tasks within 10 days.      Pt will complete bed mobility Supervision for increased independence in repositioning, pressure offloading, and managing comfort. GOAL MET; upgrade to ANDIE      Pt will demonstrate proper body mechanics and fall prevention strategies during 100% of tx sessions for increased safety awareness during ADL/IADLs     Pt will demonstrate activity tolerance of 30 min in therapeutic tasks for increased participation in meaningful activities upon D/C.  PROGRESSING      Pt will receive education on use of compensatory memory strategies for increased safety and independent with IADL tasks.      Pt will participate in ongoing cognitive assessments to assist with safe D/C planning and supervision/assistance recommendations.      Pt will verbalize and demonstrate understanding of energy conservation/deep breathing techniques for increased activity tolerance and endurance during meaningful activities.      Pt will verbalize and demonstrate understanding of B UE HEP program increase strength and endurance during self care transfers within 10 days.      Pt will demonstrate OOB sitting tolerance of 2-4 hr/day for increased activity tolerance and engagement in leisure activities within 10 days.        Gerald Muñiz, [id number] [facility] OTR/L   [place] Licensure# [id number]

## 2023-08-30 NOTE — PLAN OF CARE
Problem: Potential for Falls  Goal: Patient will remain free of falls  Description: INTERVENTIONS:  - Educate patient/family on patient safety including physical limitations  - Instruct patient to call for assistance with activity   - Consult OT/PT to assist with strengthening/mobility   - Keep Call bell within reach  - Keep bed low and locked with side rails adjusted as appropriate  - Keep care items and personal belongings within reach  - Initiate and maintain comfort rounds  - Make Fall Risk Sign visible to staff  - Offer Toileting every prn Hours, in advance of need  - Initiate/Maintain  bed alarm  - Obtain necessary fall risk management equipment: floor mat  - Apply yellow socks and bracelet for high fall risk patients  - Consider moving patient to room near nurses station  Outcome: Not Progressing     Problem: MOBILITY - ADULT  Goal: Maintain or return to baseline ADL function  Description: INTERVENTIONS:  -  Assess patient's ability to carry out ADLs; assess patient's baseline for ADL function and identify physical deficits which impact ability to perform ADLs (bathing, care of mouth/teeth, toileting, grooming, dressing, etc.)  - Assess/evaluate cause of self-care deficits   - Assess range of motion  - Assess patient's mobility; develop plan if impaired  - Assess patient's need for assistive devices and provide as appropriate  - Encourage maximum independence but intervene and supervise when necessary  - Involve family in performance of ADLs  - Assess for home care needs following discharge   - Consider OT consult to assist with ADL evaluation and planning for discharge  - Provide patient education as appropriate  Outcome: Not Progressing  Goal: Maintains/Returns to pre admission functional level  Description: INTERVENTIONS:  - Perform BMAT or MOVE assessment daily.   - Set and communicate daily mobility goal to care team and patient/family/caregiver.    - Collaborate with rehabilitation services on mobility goals if consulted  - Perform Range of Motion prn times a day. - Reposition patient every 2 hours.   - Dangle patient prn times a day  - Stand patient prn times a day  - Ambulate patient prn times a day  - Out of bed to chair prn times a day   - Out of bed for meals prn times a day  - Out of bed for toileting  - Record patient progress and toleration of activity level   Outcome: Not Progressing     Problem: SAFETY ADULT  Goal: Patient will remain free of falls  Description: INTERVENTIONS:  - Educate patient/family on patient safety including physical limitations  - Instruct patient to call for assistance with activity   - Consult OT/PT to assist with strengthening/mobility   - Keep Call bell within reach  - Keep bed low and locked with side rails adjusted as appropriate  - Keep care items and personal belongings within reach  - Initiate and maintain comfort rounds  - Make Fall Risk Sign visible to staff  - Offer Toileting every prn Hours, in advance of need  - Initiate/Maintain bed alarm  - Obtain necessary fall risk management equipment: floor mat  - Apply yellow socks and bracelet for high fall risk patients  - Consider moving patient to room near nurses station  Outcome: Not Progressing  Goal: Maintain or return to baseline ADL function  Description: INTERVENTIONS:  -  Assess patient's ability to carry out ADLs; assess patient's baseline for ADL function and identify physical deficits which impact ability to perform ADLs (bathing, care of mouth/teeth, toileting, grooming, dressing, etc.)  - Assess/evaluate cause of self-care deficits   - Assess range of motion  - Assess patient's mobility; develop plan if impaired  - Assess patient's need for assistive devices and provide as appropriate  - Encourage maximum independence but intervene and supervise when necessary  - Involve family in performance of ADLs  - Assess for home care needs following discharge   - Consider OT consult to assist with ADL evaluation and planning for discharge  - Provide patient education as appropriate  Outcome: Not Progressing  Goal: Maintains/Returns to pre admission functional level  Description: INTERVENTIONS:  - Perform BMAT or MOVE assessment daily.   - Set and communicate daily mobility goal to care team and patient/family/caregiver. - Collaborate with rehabilitation services on mobility goals if consulted  - Perform Range of Motion prn times a day. - Reposition patient every 2 hours. - Dangle patient prn times a day  - Stand patient prn times a day  - Ambulate patient prn times a day  - Out of bed to chair prn times a day   - Out of bed for meals prn times a day  - Out of bed for toileting  - Record patient progress and toleration of activity level   Outcome: Not Progressing     Problem: DISCHARGE PLANNING  Goal: Discharge to home or other facility with appropriate resources  Description: INTERVENTIONS:  - Identify barriers to discharge w/patient and caregiver  - Arrange for needed discharge resources and transportation as appropriate  - Identify discharge learning needs (meds, wound care, etc.)  - Arrange for interpretive services to assist at discharge as needed  - Refer to Case Management Department for coordinating discharge planning if the patient needs post-hospital services based on physician/advanced practitioner order or complex needs related to functional status, cognitive ability, or social support system  Outcome: Not Progressing     Problem: Knowledge Deficit  Goal: Patient/family/caregiver demonstrates understanding of disease process, treatment plan, medications, and discharge instructions  Description: Complete learning assessment and assess knowledge base.   Interventions:  - Provide teaching at level of understanding  - Provide teaching via preferred learning methods  Outcome: Not Progressing     Problem: SKIN/TISSUE INTEGRITY - ADULT  Goal: Incision(s), wounds(s) or drain site(s) healing without S/S of infection  Description: INTERVENTIONS  - Assess and document dressing, incision, wound bed, drain sites and surrounding tissue  - Provide patient and family education  - Perform skin care/dressing changes every Q 4 hrs  Outcome: Not Progressing

## 2023-08-30 NOTE — ASSESSMENT & PLAN NOTE
-in setting of new bladder mass no hx of biopsy currently on flomax and with weber  -risk factors: age and smoking hx  -per chart review there is history of prostate cancer in 2007, was seen by urology in 2019 and patient and son reported no hx of prostate cancer nor treatment for BPH   was to f/u outpatient but did not  -PSA in 2020 elevated  - follow up with urology as outpatient   - discussed plan of care with patient's son Dianna Fox  - continue Flomax  - Weber cath in place, will need voiding trial with Urology as outpatient

## 2023-08-30 NOTE — ASSESSMENT & PLAN NOTE
Evidence of volume overload small bilateral pleural effusions on imaging, elevated . · Likely iatrogenic volume overload 2/2 IVFs received during recent admission for ROMAN  · Echo: EF 65%, mild asymmetric hypertrophy. Moderately dilated LA. Mild-moderate MV regurgitation. Mild TV regurgitation. The estimated right ventricular systolic pressure is 57 mmHg.   · Similar, if not improved from prior Echo in 2018  · Completed 3 days of IV Diuretics with Furosemide  · Monitor off  · Continue Daily weights, I/Os

## 2023-08-30 NOTE — PLAN OF CARE
Problem: OCCUPATIONAL THERAPY ADULT  Goal: Performs self-care activities at highest level of function for planned discharge setting. See evaluation for individualized goals. Description: Treatment Interventions: ADL retraining, Functional transfer training, Endurance training, UE strengthening/ROM, Patient/family training, Equipment evaluation/education, Compensatory technique education, Energy conservation, Activityengagement          See flowsheet documentation for full assessment, interventions and recommendations. Outcome: Progressing  Note: Limitation: Decreased ADL status, Decreased UE ROM, Decreased UE strength, Decreased Safe judgement during ADL, Decreased endurance, Decreased self-care trans, Decreased high-level ADLs  Prognosis: Good  Assessment: Patient seen for OT treatment on 8/30/2023 s/p admission for Encephalopathy Patient agreeable to OT session. Patient participated in activity engagement , fall prevention , bed mobility , functional mobility and ADLs with intervention focus on optimizing independence in functional mobility and ADL tasks. Andrew Paniagua is showing improvements in ADLs, activity tolerance and initiation of tasks but is continuing to perform below baseline due to the following deficits: endurance ,  decreased muscular strength , decreased balance , decreased standing tolerance for self care tasks , decreased dynamic balance impacting functional reach, poor postural control , postural alignment , decreased functional reach , decreased trunk control , decreased activity tolerance , impaired judgement and problem solving  and impaired safety awareness .  Personal factors continuing to impact D/C include: (+) Hx of falls , steps to enter/navigate the home, Assistance needed for ADL/IADLs, Assistance needed for ADLs and functional mobility, High fall risk , decreased insight toward deficits  and decreased recall of precautions  From OT standpoint, patient would benefit from skilled intervention to maximize independence with ADLs and functional mobility. Goals remain appropriate, continue POC.  At this time, recommending D/C to: post-acute rehabilitation     OT Discharge Recommendation: Post acute rehabilitation services     Sammi Jean Baptiste, [id number] [facility] OTR/L   [place] Licensure# [id number]

## 2023-08-30 NOTE — ASSESSMENT & PLAN NOTE
· Patient with complaints of LE discomfort  · LLE > RLE  · Venous Duplex: Ordered  · PT/OT recommending ST

## 2023-08-30 NOTE — ASSESSMENT & PLAN NOTE
-noted on physical exam  -has been repleted with both potassium and magnesium this visit  -per son patient was started on Ranexa by cardiologist, however patient is noncompliant  -current pipe tobacco smoker with previous hx of cigarette smoking  -history of recent falls; per chart review one; per patient two  -Continue to monitor electrolytes  -consider cardiology consult

## 2023-08-30 NOTE — ASSESSMENT & PLAN NOTE
-likely contributed by medication non-adherence, back pain of unknown origin however suspect contributed by acute herpes zoster not on any pain medications and acute urinary retention in the setting of 1.8cm mass found in bladder on CT scan on 8/27/23; risk factors for CA include age and smoking history as well as PSA in 2020 elevated to 22.3 and per home medications provided by son no hx of medications for BPH  -QTc 519 on most recent EKG on 8/27/23, avoid antipsychotics  -avoid drugs that may worsen encephalopathy including benzodiazepines and anti-histamines; can use topical medications for pruritus caused by herpes zoster  -adequate oral hydration and intake  - OOB as tolerated, continue PT OT  -Monitor for constipation and manage as needed  -Avoid benzodiazepines antihistamines, tramadol

## 2023-08-31 ENCOUNTER — APPOINTMENT (INPATIENT)
Dept: VASCULAR ULTRASOUND | Facility: HOSPITAL | Age: 88
DRG: 699 | End: 2023-08-31
Payer: MEDICARE

## 2023-08-31 VITALS
OXYGEN SATURATION: 97 % | TEMPERATURE: 97 F | BODY MASS INDEX: 18.11 KG/M2 | HEIGHT: 66 IN | HEART RATE: 85 BPM | WEIGHT: 112.66 LBS | RESPIRATION RATE: 16 BRPM | SYSTOLIC BLOOD PRESSURE: 130 MMHG | DIASTOLIC BLOOD PRESSURE: 76 MMHG

## 2023-08-31 PROBLEM — E87.6 HYPOKALEMIA: Status: RESOLVED | Noted: 2023-08-28 | Resolved: 2023-08-31

## 2023-08-31 PROBLEM — I48.20 CHRONIC ATRIAL FIBRILLATION (HCC): Status: ACTIVE | Noted: 2023-08-31

## 2023-08-31 PROCEDURE — 93971 EXTREMITY STUDY: CPT

## 2023-08-31 PROCEDURE — 99239 HOSP IP/OBS DSCHRG MGMT >30: CPT | Performed by: PHYSICIAN ASSISTANT

## 2023-08-31 PROCEDURE — 93971 EXTREMITY STUDY: CPT | Performed by: SURGERY

## 2023-08-31 PROCEDURE — 97110 THERAPEUTIC EXERCISES: CPT

## 2023-08-31 PROCEDURE — 99232 SBSQ HOSP IP/OBS MODERATE 35: CPT | Performed by: FAMILY MEDICINE

## 2023-08-31 PROCEDURE — 97116 GAIT TRAINING THERAPY: CPT

## 2023-08-31 RX ORDER — GABAPENTIN 100 MG/1
100 CAPSULE ORAL
Refills: 0
Start: 2023-08-31

## 2023-08-31 RX ORDER — ACETAMINOPHEN 325 MG/1
975 TABLET ORAL 3 TIMES DAILY
Refills: 0
Start: 2023-08-31

## 2023-08-31 RX ORDER — LEVOTHYROXINE SODIUM 0.07 MG/1
75 TABLET ORAL
Refills: 0
Start: 2023-09-01

## 2023-08-31 RX ADMIN — VITAM B12 100 MCG: 100 TAB at 09:09

## 2023-08-31 RX ADMIN — HEPARIN SODIUM 5000 UNITS: 5000 INJECTION INTRAVENOUS; SUBCUTANEOUS at 05:36

## 2023-08-31 RX ADMIN — ASPIRIN 81 MG: 81 TABLET, COATED ORAL at 09:09

## 2023-08-31 RX ADMIN — ACETAMINOPHEN 975 MG: 325 TABLET, FILM COATED ORAL at 09:09

## 2023-08-31 RX ADMIN — METOPROLOL SUCCINATE 25 MG: 25 TABLET, FILM COATED, EXTENDED RELEASE ORAL at 09:08

## 2023-08-31 RX ADMIN — LEVOTHYROXINE SODIUM 75 MCG: 75 TABLET ORAL at 05:36

## 2023-08-31 RX ADMIN — ACETAMINOPHEN 650 MG: 325 TABLET, FILM COATED ORAL at 12:54

## 2023-08-31 NOTE — ASSESSMENT & PLAN NOTE
· PT/OT recommended rehab  · CM arranged for discharge to facility in [facility] as above  · LE venous duplex negative for acute or chronic LLE DVT per the results report

## 2023-08-31 NOTE — ASSESSMENT & PLAN NOTE
· Per record review, patient was brought to the ED from SNF on 8/27/23 due to lethargy and confusion   · CT head on admission with chronic microangiopathic changes but no acute intracranial abnormality per the results report  · MRI brain revealed parenchymal volume loss with white matter changes consistent with chronic microangiopathy and without mass, hemorrhage, or evidence of acute ischemia per the results report   · Was also noted to have urinary retention as below   · Gerontology evaluated, recommendations appreciated  · Mentation improved, A&O x3  · PT/OT recommending rehab - CM has arranged for discharge to facility in [facility]

## 2023-08-31 NOTE — PLAN OF CARE
Problem: Potential for Falls  Goal: Patient will remain free of falls  Description: INTERVENTIONS:  - Educate patient/family on patient safety including physical limitations  - Instruct patient to call for assistance with activity   - Consult OT/PT to assist with strengthening/mobility   - Keep Call bell within reach  - Keep bed low and locked with side rails adjusted as appropriate  - Keep care items and personal belongings within reach  - Initiate and maintain comfort rounds  - Make Fall Risk Sign visible to staff  - Offer Toileting every  Hours, in advance of need  - Initiate/Maintain alarm  - Obtain necessary fall risk management equipment:   - Apply yellow socks and bracelet for high fall risk patients  - Consider moving patient to room near nurses station  Outcome: Progressing     Problem: MOBILITY - ADULT  Goal: Maintain or return to baseline ADL function  Description: INTERVENTIONS:  -  Assess patient's ability to carry out ADLs; assess patient's baseline for ADL function and identify physical deficits which impact ability to perform ADLs (bathing, care of mouth/teeth, toileting, grooming, dressing, etc.)  - Assess/evaluate cause of self-care deficits   - Assess range of motion  - Assess patient's mobility; develop plan if impaired  - Assess patient's need for assistive devices and provide as appropriate  - Encourage maximum independence but intervene and supervise when necessary  - Involve family in performance of ADLs  - Assess for home care needs following discharge   - Consider OT consult to assist with ADL evaluation and planning for discharge  - Provide patient education as appropriate  Outcome: Progressing  Goal: Maintains/Returns to pre admission functional level  Description: INTERVENTIONS:  - Perform BMAT or MOVE assessment daily.   - Set and communicate daily mobility goal to care team and patient/family/caregiver.    - Collaborate with rehabilitation services on mobility goals if consulted  - Perform Range of Motion times a day. - Reposition patient every  hours.   - Dangle patient  times a day  - Stand patient  times a day  - Ambulate patient  times a day  - Out of bed to chair times a day   - Out of bed for meals times a day  - Out of bed for toileting  - Record patient progress and toleration of activity level   Outcome: Progressing     Problem: PAIN - ADULT  Goal: Verbalizes/displays adequate comfort level or baseline comfort level  Description: Interventions:  - Encourage patient to monitor pain and request assistance  - Assess pain using appropriate pain scale  - Administer analgesics based on type and severity of pain and evaluate response  - Implement non-pharmacological measures as appropriate and evaluate response  - Consider cultural and social influences on pain and pain management  - Notify physician/advanced practitioner if interventions unsuccessful or patient reports new pain  Outcome: Progressing     Problem: INFECTION - ADULT  Goal: Absence or prevention of progression during hospitalization  Description: INTERVENTIONS:  - Assess and monitor for signs and symptoms of infection  - Monitor lab/diagnostic results  - Monitor all insertion sites, i.e. indwelling lines, tubes, and drains  - Monitor endotracheal if appropriate and nasal secretions for changes in amount and color  - [place] appropriate cooling/warming therapies per order  - Administer medications as ordered  - Instruct and encourage patient and family to use good hand hygiene technique  - Identify and instruct in appropriate isolation precautions for identified infection/condition  Outcome: Progressing  Goal: Absence of fever/infection during neutropenic period  Description: INTERVENTIONS:  - Monitor WBC    Outcome: Progressing     Problem: SAFETY ADULT  Goal: Patient will remain free of falls  Description: INTERVENTIONS:  - Educate patient/family on patient safety including physical limitations  - Instruct patient to call for assistance with activity   - Consult OT/PT to assist with strengthening/mobility   - Keep Call bell within reach  - Keep bed low and locked with side rails adjusted as appropriate  - Keep care items and personal belongings within reach  - Initiate and maintain comfort rounds  - Make Fall Risk Sign visible to staff  - Offer Toileting every  Hours, in advance of need  - Initiate/Maintain alarm  - Obtain necessary fall risk management equipment:   - Apply yellow socks and bracelet for high fall risk patients  - Consider moving patient to room near nurses station  Outcome: Progressing  Goal: Maintain or return to baseline ADL function  Description: INTERVENTIONS:  -  Assess patient's ability to carry out ADLs; assess patient's baseline for ADL function and identify physical deficits which impact ability to perform ADLs (bathing, care of mouth/teeth, toileting, grooming, dressing, etc.)  - Assess/evaluate cause of self-care deficits   - Assess range of motion  - Assess patient's mobility; develop plan if impaired  - Assess patient's need for assistive devices and provide as appropriate  - Encourage maximum independence but intervene and supervise when necessary  - Involve family in performance of ADLs  - Assess for home care needs following discharge   - Consider OT consult to assist with ADL evaluation and planning for discharge  - Provide patient education as appropriate  Outcome: Progressing  Goal: Maintains/Returns to pre admission functional level  Description: INTERVENTIONS:  - Perform BMAT or MOVE assessment daily.   - Set and communicate daily mobility goal to care team and patient/family/caregiver. - Collaborate with rehabilitation services on mobility goals if consulted  - Perform Range of Motion  times a day. - Reposition patient every  hours.   - Dangle patient  times a day  - Stand patient times a day  - Ambulate patient  times a day  - Out of bed to chair  times a day   - Out of bed for meals times a day  - Out of bed for toileting  - Record patient progress and toleration of activity level   Outcome: Progressing     Problem: DISCHARGE PLANNING  Goal: Discharge to home or other facility with appropriate resources  Description: INTERVENTIONS:  - Identify barriers to discharge w/patient and caregiver  - Arrange for needed discharge resources and transportation as appropriate  - Identify discharge learning needs (meds, wound care, etc.)  - Arrange for interpretive services to assist at discharge as needed  - Refer to Case Management Department for coordinating discharge planning if the patient needs post-hospital services based on physician/advanced practitioner order or complex needs related to functional status, cognitive ability, or social support system  Outcome: Progressing     Problem: Knowledge Deficit  Goal: Patient/family/caregiver demonstrates understanding of disease process, treatment plan, medications, and discharge instructions  Description: Complete learning assessment and assess knowledge base. Interventions:  - Provide teaching at level of understanding  - Provide teaching via preferred learning methods  Outcome: Progressing     Problem: SKIN/TISSUE INTEGRITY - ADULT  Goal: Incision(s), wounds(s) or drain site(s) healing without S/S of infection  Description: INTERVENTIONS  - Assess and document dressing, incision, wound bed, drain sites and surrounding tissue  - Provide patient and family education  - Perform skin care/dressing changes every   Outcome: Progressing     Problem: Nutrition/Hydration-ADULT  Goal: Nutrient/Hydration intake appropriate for improving, restoring or maintaining nutritional needs  Description: Monitor and assess patient's nutrition/hydration status for malnutrition. Collaborate with interdisciplinary team and initiate plan and interventions as ordered. Monitor patient's weight and dietary intake as ordered or per policy.  Utilize nutrition screening tool and intervene as necessary. Determine patient's food preferences and provide high-protein, high-caloric foods as appropriate.      INTERVENTIONS:  - Monitor oral intake, urinary output, labs, and treatment plans  - Assess nutrition and hydration status and recommend course of action  - Evaluate amount of meals eaten  - Assist patient with eating if necessary   - Allow adequate time for meals  - Recommend/ encourage appropriate diets, oral nutritional supplements, and vitamin/mineral supplements  - Order, calculate, and assess calorie counts as needed  - Recommend, monitor, and adjust tube feedings and TPN/PPN based on assessed needs  - Assess need for intravenous fluids  - Provide specific nutrition/hydration education as appropriate  - Include patient/family/caregiver in decisions related to nutrition  Outcome: Progressing     Problem: Prexisting or High Potential for Compromised Skin Integrity  Goal: Skin integrity is maintained or improved  Description: INTERVENTIONS:  - Identify patients at risk for skin breakdown  - Assess and monitor skin integrity  - Assess and monitor nutrition and hydration status  - Monitor labs   - Assess for incontinence   - Turn and reposition patient  - Assist with mobility/ambulation  - Relieve pressure over bony prominences  - Avoid friction and shearing  - Provide appropriate hygiene as needed including keeping skin clean and dry  - Evaluate need for skin moisturizer/barrier cream  - Collaborate with interdisciplinary team   - Patient/family teaching  - Consider wound care consult   Outcome: Progressing

## 2023-08-31 NOTE — TELEPHONE ENCOUNTER
Would 9/15 with Dr. Robert Began be appropriate for both the cysto and void trial in the PM with the RNs

## 2023-08-31 NOTE — PHYSICAL THERAPY NOTE
PHYSICAL THERAPY Treatment  DATE: 08/31/23  TIME: 5904-9544    NAME:  Abdirashid Manley  AGE:   80 y.o. Mrn:   [id number]  Length Of Stay: 4    ADMIT DX:  Urinary retention [R33.9]  CHF (congestive heart failure) ([address]) [I50.9]  Altered mental state [R41.82]  Herpes zoster [B02.9]    Past Medical History:   Diagnosis Date   • Arthritis    • Coronary artery disease    • Disease of thyroid gland    • Heart disease    • Hypertension      Past Surgical History:   Procedure Laterality Date   • CARDIAC SURGERY     • HERNIA REPAIR          08/31/23 1037   PT Last Visit   PT Visit Date 08/31/23   Note Type   Note Type Treatment   Pain Assessment   Pain Assessment Tool 0-10   Pain Score No Pain   Restrictions/Precautions   Weight Bearing Precautions Per Order No   Other Precautions Cognitive; Chair Alarm; Bed Alarm; Fall Risk   General   Chart Reviewed Yes   Additional Pertinent History No significant change since last PT session   Response to Previous Treatment Patient with no complaints from previous session. Family/Caregiver Present Yes  (Family arrived at end of session. reports they intend to take the patient to their family's home. PA notified of this information)   Cognition   Overall Cognitive Status WFL   Arousal/Participation Arousable   Attention Attends with cues to redirect   Orientation Level Oriented X4   Memory Decreased short term memory;Decreased recall of recent events;Decreased recall of precautions   Following Commands Follows multistep commands with increased time or repetition   Subjective   Subjective Pt without significant complaint, but feeling very tired. Bed Mobility   Rolling R 3  Moderate assistance   Additional items Assist x 1;LE management; Increased time required  (flat bed, assist for setup and trunk control)   Supine to Sit 3  Moderate assistance   Additional items Assist x 1;LE management;Verbal cues; Increased time required  (from flat bed, once patient positioned at EOB and LEs off EOB, pt with cues is able to raie trunk to sitting and CGA.)   Transfers   Sit to Stand 4  Minimal assistance   Additional items Assist x 1; Impulsive;Verbal cues  (pt requires several attemtps to stand due to poor utilziation of UEs.  when cued to push from bed pt able to perform with CGA but demonstrating instabiltiy and strong lean to left)   Stand to Sit 4  Minimal assistance   Additional items Assist x 1; Impulsive;Verbal cues  (pt impulsively attempts to sit without regard for control. cues for setup and use of UEs.)   Ambulation/Elevation   Gait pattern Shuffling  (flexed posturing, decreased step length and foot clearance. Poor carry over of cues. pt fatigues quickly and exhibits very slow pace.)   Gait Assistance 4  Minimal assist   Additional items Assist x 1;Verbal cues; Tactile cues  (assist for balance and walker management)   Assistive Device Rolling walker   Distance 20'   Balance   Static Sitting Fair   Dynamic Sitting Fair -   Static Standing Fair   Dynamic Standing Fair -   Ambulatory Fair -   Endurance Deficit   Endurance Deficit Yes   Endurance Deficit Description Pt seems to fatigue easily with standing activity. instability noted with prolonged standing requiring frequent balance checks   Activity Tolerance   Activity Tolerance Patient limited by fatigue   Medical Staff Made Aware spoke with PA   Exercises   Heelslides Sitting;10 reps;AROM; Bilateral   Hip Flexion Sitting;10 reps;AROM; Bilateral   Hip Abduction Sitting;10 reps;Bilateral;AROM   Hip Extension Sitting;10 reps;Bilateral;AROM   Ankle Pumps Sitting;10 reps;AROM; Bilateral   Assessment   Prognosis Fair   Problem List Decreased strength;Decreased range of motion;Decreased endurance; Impaired balance;Decreased mobility; Impaired judgement;Decreased safety awareness;Pain; Impaired sensation   Assessment Pt seemed to tolerate and motivate much better this session.   Despite slow pace pt seems to have improved motor control and balance in standing, but leans to the left. some improvement to correct with cues. Pt continues to exhibit physical deficits listed above, which coorelate with difficulty performing safe functional tasks at EOB, standing for prolonged periods of time, performing safe transfers independently, or ambulating house hold distances safely with FWW. Pt continues to exhibit the need for ongoing regular therapy intervention and would benefit from SNF placement at time of discharge. Barriers to Discharge Decreased caregiver support; Inaccessible home environment   Goals   Patient Goals "get out of here"   [facility] Expiration Date 09/08/23   Short Term Goal #1 1) Pt will perform all bed mobility from flat bed, mod I.  2) Pt will perform stand pivot transfer with LRAD, mod I. 3) Pt will ambulate 100' with LRAD, sup A. 4) Pt will perform >10 minutes of standing HEP, with cues. 5) Pt will ascend/descend 5 steps with rail, SBA. Plan   Treatment/Interventions Functional transfer training;Elevations;LE strengthening/ROM; Therapeutic exercise; Endurance training;Cognitive reorientation;Patient/family training;Equipment eval/education;Gait training;Bed mobility   PT Frequency 3-5x/wk   Recommendation   PT Discharge Recommendation Post acute rehabilitation services   AM-PAC Basic Mobility Inpatient   Turning in Flat Bed Without Bedrails 3   Lying on Back to Sitting on Edge of Flat Bed Without Bedrails 2   Moving Bed to Chair 3   Standing Up From Chair Using Arms 3   Walk in Room 3   Climb 3-5 Stairs With Railing 2   Basic Mobility Inpatient Raw Score 16   Basic Mobility Standardized Score 38.32   Highest Level Of Mobility   JH-HLM Goal 5: Stand one or more mins   JH-HLM Achieved 6: Walk 10 steps or more   Education   Education Provided Mobility training;Home exercise program;Assistive device   Patient Reinforcement needed   End of Consult   Patient Position at End of Consult Bedside chair;Bed/Chair alarm activated; All needs within reach  [facility] present in room)   The patient's AM-PAC Basic Mobility Inpatient Short Form Raw Score is 16. A Raw score of greater than or equal to 16 suggests the patient may benefit from discharge to post-acute rehabilitation services. However, pt presents as a significant fall risk, requiring min A for most activities and during ambulating to correct balance before potential fall. Additionally, pt's endurance is very limiting that he would not tolerate being able to take care of his daily needs. Pt would benefit from SNF placement at this time, as he appears to display a significant change in his functional status compared to his baseline. Please also refer to the recommendation of the Physical Therapist for safe discharge planning.     Bia Lima, PT, DPT  [place] Lic[place] #[id number]

## 2023-08-31 NOTE — ASSESSMENT & PLAN NOTE
· Acute urinary retention on arrival, Weber inserted in ED with immediate removal of 900 cc clear urine.   · Continue Flomax  · Urology consulted, recommendations appreciated:  · No urgent urological intervention indicated  · Recommend outpatient follow up with Urology for further evaluation of bladder mass  · Maintain weber catheter upon discharge

## 2023-08-31 NOTE — ASSESSMENT & PLAN NOTE
· Noted history in Care Everywhere record review  · Per record review from [facility], Dr. Sarah Butt, patient with chronic A.  Fib and with frequent falls - was noted that it was felt that the risks of [facility] outweighed stroke prevention at that time and patient only on low dose aspirin

## 2023-08-31 NOTE — ASSESSMENT & PLAN NOTE
· Recently started on Valtrex for shingles eruption on RLE and right lower back  · Started 8/18/23, now off Valtrex  · Continue Contact precaution  · Wound care

## 2023-08-31 NOTE — PLAN OF CARE
Problem: Potential for Falls  Goal: Patient will remain free of falls  Description: INTERVENTIONS:  - Educate patient/family on patient safety including physical limitations  - Instruct patient to call for assistance with activity   - Consult OT/PT to assist with strengthening/mobility   - Keep Call bell within reach  - Keep bed low and locked with side rails adjusted as appropriate  - Keep care items and personal belongings within reach  - Initiate and maintain comfort rounds  - Make Fall Risk Sign visible to staff  - Offer Toileting every  Hours, in advance of need  - Initiate/Maintain alarm  - Obtain necessary fall risk management equipment:   - Apply yellow socks and bracelet for high fall risk patients  - Consider moving patient to room near nurses station  Outcome: Progressing     Problem: MOBILITY - ADULT  Goal: Maintain or return to baseline ADL function  Description: INTERVENTIONS:  -  Assess patient's ability to carry out ADLs; assess patient's baseline for ADL function and identify physical deficits which impact ability to perform ADLs (bathing, care of mouth/teeth, toileting, grooming, dressing, etc.)  - Assess/evaluate cause of self-care deficits   - Assess range of motion  - Assess patient's mobility; develop plan if impaired  - Assess patient's need for assistive devices and provide as appropriate  - Encourage maximum independence but intervene and supervise when necessary  - Involve family in performance of ADLs  - Assess for home care needs following discharge   - Consider OT consult to assist with ADL evaluation and planning for discharge  - Provide patient education as appropriate  Outcome: Progressing     Problem: PAIN - ADULT  Goal: Verbalizes/displays adequate comfort level or baseline comfort level  Description: Interventions:  - Encourage patient to monitor pain and request assistance  - Assess pain using appropriate pain scale  - Administer analgesics based on type and severity of pain and evaluate response  - Implement non-pharmacological measures as appropriate and evaluate response  - Consider cultural and social influences on pain and pain management  - Notify physician/advanced practitioner if interventions unsuccessful or patient reports new pain  Outcome: Progressing     Problem: INFECTION - ADULT  Goal: Absence or prevention of progression during hospitalization  Description: INTERVENTIONS:  - Assess and monitor for signs and symptoms of infection  - Monitor lab/diagnostic results  - Monitor all insertion sites, i.e. indwelling lines, tubes, and drains  - Monitor endotracheal if appropriate and nasal secretions for changes in amount and color  - [place] appropriate cooling/warming therapies per order  - Administer medications as ordered  - Instruct and encourage patient and family to use good hand hygiene technique  - Identify and instruct in appropriate isolation precautions for identified infection/condition  Outcome: Progressing     Problem: Knowledge Deficit  Goal: Patient/family/caregiver demonstrates understanding of disease process, treatment plan, medications, and discharge instructions  Description: Complete learning assessment and assess knowledge base.   Interventions:  - Provide teaching at level of understanding  - Provide teaching via preferred learning methods  Outcome: Progressing     Problem: SKIN/TISSUE INTEGRITY - ADULT  Goal: Incision(s), wounds(s) or drain site(s) healing without S/S of infection  Description: INTERVENTIONS  - Assess and document dressing, incision, wound bed, drain sites and surrounding tissue  - Provide patient and family education  - Perform skin care/dressing changes every   Outcome: Progressing

## 2023-08-31 NOTE — PLAN OF CARE
Problem: PHYSICAL THERAPY ADULT  Goal: Performs mobility at highest level of function for planned discharge setting. See evaluation for individualized goals. Description: Treatment/Interventions: Functional transfer training, Elevations, LE strengthening/ROM, Therapeutic exercise, Endurance training, Cognitive reorientation, Patient/family training, Equipment eval/education, Gait training, Bed mobility          See flowsheet documentation for full assessment, interventions and recommendations. Outcome: Progressing  Note: Prognosis: Fair  Problem List: Decreased strength, Decreased range of motion, Decreased endurance, Impaired balance, Decreased mobility, Impaired judgement, Decreased safety awareness, Pain, Impaired sensation  Assessment: Pt seemed to tolerate and motivate much better this session. Despite slow pace pt seems to have improved motor control and balance in standing, but leans to the left. some improvement to correct with cues. Pt continues to exhibit physical deficits listed above, which coorelate with difficulty performing safe functional tasks at EOB, standing for prolonged periods of time, performing safe transfers independently, or ambulating house hold distances safely with FWW. Pt continues to exhibit the need for ongoing regular therapy intervention and would benefit from SNF placement at time of discharge. Barriers to Discharge: Decreased caregiver support, Inaccessible home environment     PT Discharge Recommendation: Post acute rehabilitation services    See flowsheet documentation for full assessment.

## 2023-08-31 NOTE — CASE MANAGEMENT
Case Management Discharge Planning Note    Patient name Arianne Pretty  Location [address] [address] MRN [id number]  : 1930 Date 2023       Current Admission Date: 2023  Current Admission Diagnosis:Encephalopathy   Patient Active Problem List    Diagnosis Date Noted   • Chronic atrial fibrillation ([address]) 2023   • History of recent fall 2023   • Irregular heart rate 2023   • Hypokalemia 2023   • Moderate protein-calorie malnutrition ([address]) 2023   • Encephalopathy 2023   • Bilateral pleural effusion 2023   • Recent history of herpes zoster 2023   • Mass of bladder 2023   • Urinary retention 2023   • Dehydration 2023   • Herpes zoster without complication    • Memory loss 2023   • Ambulatory dysfunction 2023   • ROMAN (acute kidney injury) ([address]) 2023   • History of prostate cancer 2019   • Mitral valve disorder 2018   • Hyperlipemia, mixed 2018   • Primary hypertension 2018   • Acquired hypothyroidism 2018   • Coronary artery disease involving native coronary artery of native heart without angina pectoris 04/15/2018      LOS (days): 4  Geometric Mean LOS (GMLOS) (days): 3.10  Days to GMLOS:-0.7     OBJECTIVE:  Risk of Unplanned Readmission Score: 15.25         Current admission status: Inpatient   Preferred Pharmacy:   [facility] [address] [address] [place] [address]  Phone: [phone] Fax: [phone] [address]  Phone: [phone] Fax: [phone]    Primary Care Provider: Baltazar Gomez PA-C    Primary Insurance: MEDICARE  Secondary Insurance: COMMERCIAL MISCELLANEOUS    DISCHARGE DETAILS:    Discharge planning discussed with[de-identified] patient, sons and grandson  Freedom of Choice: Yes  Comments - Freedom of Choice: Cm informed that family wants to meet for dc. Cm was able to confirm that [facility] will accept patient today. They are aware that sons will provide transport. Cm was provided contact number for family on drop off time. Family is aware of location and that they will need to leave hospital and go directly to [facility]. All parties aware and in agreement. CM contacted family/caregiver?: Yes  Were Treatment Team discharge recommendations reviewed with patient/caregiver?: Yes  Did patient/caregiver verbalize understanding of patient care needs?: N/A- going to facility  Were patient/caregiver advised of the risks associated with not following Treatment Team discharge recommendations?: Yes    Contacts  Patient Contacts: Ap Beltran  Relationship to Patient[de-identified] Family  Contact Method: Phone  Phone Number: [phone]  Reason/Outcome: Discharge Planning, Emergency [address]         Is the patient interested in [address] [facility] at discharge?: No    DME Referral Provided  Referral made for DME?: No    Other Referral/Resources/Interventions Provided:  Referral Comments: referrals to [facility] at son's request. Accepting facility-[facility]         Treatment Team Recommendation: Short Term Rehab  Discharge Destination Plan[de-identified] Short Term Rehab  Transport at Discharge : Family           ETA of Transport (Date): 08/31/23  ETA of Transport (Time): 1300     IMM reviewed with patient's caregiver, patient's caregiver agrees with discharge determination.

## 2023-08-31 NOTE — TELEPHONE ENCOUNTER
Appts scheduled 9/15 with Dr. Darrel Gonzalez. Will have void trial same day if deemed appropriate by doctor in the morning. Patient is currently admitted.  Please watch for d/c and call patient or family or facility to confirm

## 2023-08-31 NOTE — PROGRESS NOTES
Progress Note - Geriatric Medicine   Obdulia Romero 80 y.o. male MRN: [id number]  Unit/Bed#: -01 Encounter: [id number]      Assessment/Plan:  Irregular heart rate  Assessment & Plan  -noted on physical exam  -has been repleted with both potassium and magnesium this visit  -per son patient was started on Ranexa by cardiologist, however patient is noncompliant  -current pipe tobacco smoker with previous hx of cigarette smoking  -history of recent falls; per chart review one; per patient two  -Continue to monitor electrolytes  -consider cardiology consult      History of recent fall  Assessment & Plan  -hx of fall post discharge on 8/23 to rehab facility while going to bathroom no LOC  -Monitor orthostatic vital signs  -Continue PT OT  -Encourage p.o. intake and provide protein supplements    Urinary retention  Assessment & Plan  -in setting of new bladder mass no hx of biopsy currently on flomax and with weber  -risk factors: age and smoking hx  -per chart review there is history of prostate cancer in 2007, was seen by urology in 2019 and patient and son reported no hx of prostate cancer nor treatment for BPH   was to f/u outpatient but did not  -PSA in 2020 elevated  - follow up with urology as outpatient   - discussed plan of care with patient's son Jennie Low  - continue Flomax  - Weber cath in place, will need voiding trial with Urology as outpatient    Herpes zoster without complication  Assessment & Plan  -noted on previous hospital admission valtrex discontinued on 8/28/23   -tylenol 975mg TID  -gabapentin 100mg hs  - rash healing , no vesicles noted on exam    Memory loss  Assessment & Plan  -son noted non-adherence to medications otherwise AAOx3  - consider geriatric assessment as outpatient  - provide supportive care and reorient as needed     Ambulatory dysfunction  Assessment & Plan  -prior baseline per son Lance Pritchett includes walking daily without issue including walking dog and driving  -complains of left foot pain and sensitivity during exam today and son Bonita Carrion reports noted this new onset foot pain  -physical exam with swollen left leg and pain out of proportion to touch and with minimal movement  -VDE LLE negative for DVT  - continue tylenol ATC and gabapentin    * Encephalopathy  Assessment & Plan  -likely contributed by medication non-adherence, back pain of unknown origin however suspect contributed by acute herpes zoster not on any pain medications and acute urinary retention in the setting of 1.8cm mass found in bladder on CT scan on 8/27/23; risk factors for CA include age and smoking history as well as PSA in 2020 elevated to 22.3 and per home medications provided by son my hx of medications for BPH  -QTc 519 on most recent EKG on 8/27/23, avoid antipsychotics  -avoid drugs that may worsen encephalopathy including benzodiazepines and anti-histamines; can use topical medications for pruritus caused by herpes zoster  -adequate oral hydration and intake  - OOB as tolerated, continue PT OT  -Monitor for constipation and manage as needed  -Avoid benzodiazepines antihistamines, tramadol      Subjective:   Patient seen and examined at bedside, denies any acute complaints, states he slept well, had a Bm yesterday. Denies pain at the time of encounter. Review of Systems   Constitutional: Negative for chills, fatigue and fever. HENT: Negative for congestion, rhinorrhea and sore throat. Respiratory: Negative for cough, shortness of breath and wheezing. Cardiovascular: Negative for chest pain. Gastrointestinal: Negative for abdominal pain, constipation and nausea. Genitourinary: Positive for hematuria. Negative for dysuria. Easton cath   Musculoskeletal: Positive for back pain and gait problem. Skin: Negative for rash and wound. Allergic/Immunologic: Negative for environmental allergies. Neurological: Negative for dizziness and syncope. Hematological: Does not bruise/bleed easily. Psychiatric/Behavioral: Negative for behavioral problems and sleep disturbance. Objective:     Vitals: Blood pressure 130/76, pulse 85, temperature (!) 97 °F (36.1 °C), temperature source Tympanic, resp. rate 16, height 5' 6" (1.676 m), weight 51.1 kg (112 lb 10.5 oz), SpO2 97 %. ,Body mass index is 18.18 kg/m². Intake/Output Summary (Last 24 hours) at 8/31/2023 1043  Last data filed at 8/31/2023 0547  Gross per 24 hour   Intake --   Output 750 ml   Net -750 ml       Current Medications: Reviewed    Physical Exam:   Physical Exam  Vitals and nursing note reviewed. Constitutional:       General: He is not in acute distress. Appearance: He is well-developed. HENT:      Head: Normocephalic and atraumatic. Mouth/Throat:      Mouth: Mucous membranes are dry. Eyes:      Conjunctiva/sclera: Conjunctivae normal.   Cardiovascular:      Rate and Rhythm: Normal rate and regular rhythm. Heart sounds: Heart sounds are distant. No murmur heard. Pulmonary:      Effort: Pulmonary effort is normal. No respiratory distress. Breath sounds: Normal breath sounds. Abdominal:      Palpations: Abdomen is soft. Tenderness: There is abdominal tenderness. Genitourinary:     Comments: Easton cath  Musculoskeletal:         General: Tenderness present. No swelling. Cervical back: Neck supple. Right lower leg: No edema. Left lower leg: Edema (trace) present. Skin:     General: Skin is warm and dry. Capillary Refill: Capillary refill takes less than 2 seconds. Neurological:      Mental Status: He is alert and oriented to person, place, and time.    Psychiatric:         Mood and Affect: Mood normal.          Invasive Devices     Peripheral Intravenous Line  Duration           Peripheral IV 08/29/23 Left;Upper;Ventral (anterior) Arm 1 day          Drain  Duration           Urethral Catheter Latex;Straight-tip 16 Fr. 3 days                Lab, Imaging and other studies: I have personally reviewed pertinent reports.

## 2023-08-31 NOTE — ASSESSMENT & PLAN NOTE
· TSH elevated 5.5, T4 elevated 1.57 this admission  · Recent admission with TSH elevated 16, although nl T4  · Levothyroxine was decreased to 75 mcg   · Repeat TSH/T4 in 4-6 weeks at discharge
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
## 2024-04-08 NOTE — ASSESSMENT & PLAN NOTE
No care due was identified.  [facility] Embedded Care Due Messages. Reference number: [id number].   4/08/2024 8:25:27 AM CDT   · CXR revealed small b/l pleural effusion and was noted to have elevated . · Was felt to likely be iatrogenic volume overload 2/2 IVFs received during recent admission for ROMAN  · Echo: EF 65%, mild asymmetric hypertrophy. Moderately dilated LA. Mild-moderate MV regurgitation. Mild TV regurgitation. The estimated right ventricular systolic pressure is 57 mmHg.   · Completed 3 days of IV Diuretics with Furosemide  · Now off diuretics  · On room air  · Respiratory status is stable
